# Patient Record
Sex: MALE | Race: WHITE | NOT HISPANIC OR LATINO | Employment: UNEMPLOYED | URBAN - NONMETROPOLITAN AREA
[De-identification: names, ages, dates, MRNs, and addresses within clinical notes are randomized per-mention and may not be internally consistent; named-entity substitution may affect disease eponyms.]

---

## 2017-04-21 ENCOUNTER — HISTORY (OUTPATIENT)
Dept: EMERGENCY MEDICINE | Facility: OTHER | Age: 42
End: 2017-04-21

## 2017-10-03 ENCOUNTER — OFFICE VISIT - GICH (OUTPATIENT)
Dept: FAMILY MEDICINE | Facility: OTHER | Age: 42
End: 2017-10-03

## 2017-10-03 ENCOUNTER — HISTORY (OUTPATIENT)
Dept: FAMILY MEDICINE | Facility: OTHER | Age: 42
End: 2017-10-03

## 2017-10-03 ENCOUNTER — HOSPITAL ENCOUNTER (OUTPATIENT)
Dept: RADIOLOGY | Facility: OTHER | Age: 42
End: 2017-10-03
Attending: NURSE PRACTITIONER

## 2017-10-03 DIAGNOSIS — M25.522 PAIN IN LEFT ELBOW: ICD-10-CM

## 2017-10-03 DIAGNOSIS — S50.02XA CONTUSION OF LEFT ELBOW: ICD-10-CM

## 2017-10-17 ENCOUNTER — COMMUNICATION - GICH (OUTPATIENT)
Dept: FAMILY MEDICINE | Facility: OTHER | Age: 42
End: 2017-10-17

## 2017-10-17 DIAGNOSIS — E10.3599 TYPE 1 DIABETES MELLITUS WITH PROLIFERATIVE RETINOPATHY WITHOUT MACULAR EDEMA (H): ICD-10-CM

## 2017-11-15 ENCOUNTER — APPOINTMENT (OUTPATIENT)
Dept: OCCUPATIONAL MEDICINE | Facility: OTHER | Age: 42
End: 2017-11-15

## 2017-11-15 PROCEDURE — 99000 SPECIMEN HANDLING OFFICE-LAB: CPT

## 2017-12-19 ENCOUNTER — APPOINTMENT (OUTPATIENT)
Dept: GENERAL RADIOLOGY | Facility: HOSPITAL | Age: 42
End: 2017-12-19
Attending: NURSE PRACTITIONER
Payer: MEDICAID

## 2017-12-19 ENCOUNTER — HOSPITAL ENCOUNTER (EMERGENCY)
Facility: HOSPITAL | Age: 42
Discharge: HOME | End: 2017-12-19
Attending: NURSE PRACTITIONER | Admitting: NURSE PRACTITIONER
Payer: MEDICAID

## 2017-12-19 VITALS
DIASTOLIC BLOOD PRESSURE: 80 MMHG | OXYGEN SATURATION: 97 % | HEART RATE: 91 BPM | RESPIRATION RATE: 16 BRPM | SYSTOLIC BLOOD PRESSURE: 127 MMHG | TEMPERATURE: 96.6 F

## 2017-12-19 DIAGNOSIS — S39.012A STRAIN OF LUMBAR REGION, INITIAL ENCOUNTER: ICD-10-CM

## 2017-12-19 DIAGNOSIS — M62.830 BACK MUSCLE SPASM: ICD-10-CM

## 2017-12-19 PROCEDURE — 96372 THER/PROPH/DIAG INJ SC/IM: CPT

## 2017-12-19 PROCEDURE — 99203 OFFICE O/P NEW LOW 30 MIN: CPT | Performed by: NURSE PRACTITIONER

## 2017-12-19 PROCEDURE — 99214 OFFICE O/P EST MOD 30 MIN: CPT | Mod: 25

## 2017-12-19 PROCEDURE — 72100 X-RAY EXAM L-S SPINE 2/3 VWS: CPT | Mod: TC

## 2017-12-19 PROCEDURE — 25000128 H RX IP 250 OP 636: Performed by: NURSE PRACTITIONER

## 2017-12-19 RX ORDER — IBUPROFEN 800 MG/1
800 TABLET, FILM COATED ORAL EVERY 8 HOURS PRN
Qty: 20 TABLET | Refills: 0 | Status: SHIPPED | OUTPATIENT
Start: 2017-12-19 | End: 2017-12-27

## 2017-12-19 RX ORDER — CYCLOBENZAPRINE HCL 10 MG
10 TABLET ORAL 3 TIMES DAILY PRN
Qty: 20 TABLET | Refills: 0 | Status: SHIPPED | OUTPATIENT
Start: 2017-12-19 | End: 2018-09-13

## 2017-12-19 RX ORDER — KETOROLAC TROMETHAMINE 30 MG/ML
60 INJECTION, SOLUTION INTRAMUSCULAR; INTRAVENOUS ONCE
Status: COMPLETED | OUTPATIENT
Start: 2017-12-19 | End: 2017-12-19

## 2017-12-19 RX ADMIN — KETOROLAC TROMETHAMINE 60 MG: 30 INJECTION, SOLUTION INTRAMUSCULAR at 16:54

## 2017-12-19 NOTE — ED AVS SNAPSHOT
[facility]    [address]    [place] [address]    Phone:  [phone]                                       Malik Melendez   MRN: [id number]    Department:  [facility]   Date of Visit:  12/19/2017           After Visit Summary Signature Page     I have received my discharge instructions, and my questions have been answered. I have discussed any challenges I see with this plan with the nurse or doctor.    ..........................................................................................................................................  Patient/Patient Representative Signature      ..........................................................................................................................................  Patient Representative Print Name and Relationship to Patient    ..................................................               ................................................  Date                                            Time    ..........................................................................................................................................  Reviewed by Signature/Title    ...................................................              ..............................................  Date                                                            Time

## 2017-12-19 NOTE — ED AVS SNAPSHOT
[facility]    [address]    [facility] [address]    Phone:  [phone]                                       Malik Melendez   MRN: [id number]    Department:  [facility]   Date of Visit:  12/19/2017           Patient Information     Date Of Birth          1975        Your diagnoses for this visit were:     Strain of lumbar region, initial encounter     Back muscle spasm        You were seen by Jessica Moss NP.      Follow-up Information     Follow up with Josué Puentes MD.    Specialty:  Speciality Unknown    Why:  As needed, If symptoms worsen    Contact information:    [facility]  [address]  [facility] [address] [phone]          Follow up with [facility].    Specialty:  EMERGENCY MEDICINE    Why:  As needed, If symptoms worsen    Contact information:    [address]  [facility] [address] [phone]    Additional information:    From [facility]: Take [address]. Turn left at [address]. Turn left at the first stoplight on [address]. At the first stop sign, take a right onto [address]. Take a left into the parking lot and continue through until you reach the North enterance of the building.       From [place]: Take [address]. Take the [address] ramp towards [place]. Turn left onto [address]. Take a slight right onto [address]. Turn left at the first stoplight on [address]. At the first stop sign, take a right onto [address]. Take a left into the parking lot and continue through until you reach the North enterance of the building.       From [place]: Take [address]. Take a right at [address]. At the first stop sign, take a right onto [address]. Take a left into the parking lot and continue through until you reach the North enterance of the building.         Discharge Instructions       Take tylenol and/or ibuprofen for  pain. Take ibuprofen with food. Do not take other NSAIDS (aspirin, Advil, etc..) when taking.   Take Flexeril as needed as directed for muscle spasms. Do not drive or participate in activities that require alertness when taking.   Apply ice and/or heat to back for 20 minutes every 1-2 hours.   Work note.   Follow up with PCP with any increase in symptoms or concerns.   Return to urgent care or emergency department with any increase in symptoms or concerns.     Discharge References/Attachments     MUSCLE SPASM (ENGLISH)    BACK SPRAIN/STRAIN (ENGLISH)         Review of your medicines      START taking        Dose / Directions Last dose taken    cyclobenzaprine 10 MG tablet   Commonly known as:  FLEXERIL   Dose:  10 mg   Quantity:  20 tablet        Take 1 tablet (10 mg) by mouth 3 times daily as needed for muscle spasms   Refills:  0        ibuprofen 800 MG tablet   Commonly known as:  ADVIL/MOTRIN   Dose:  800 mg   Quantity:  20 tablet        Take 1 tablet (800 mg) by mouth every 8 hours as needed for moderate pain   Refills:  0          Our records show that you are taking the medicines listed below. If these are incorrect, please call your family doctor or clinic.        Dose / Directions Last dose taken    CIALIS 10 MG tablet   Quantity:  one month supply   Generic drug:  tadalafil        ONE TABLET, TAKEN AT LEAST 30 MINUTES BEFORE INTERCOURSE   Refills:  3        citalopram 40 MG tablet   Commonly known as:  celeXA        1 TABLET DAILY   Refills:  0        HUMALOG SC        None Entered   Refills:  0        LANTUS SC        None Entered   Refills:  0        LIPITOR 10 MG tablet   Generic drug:  atorvastatin        1 TABLET DAILY   Refills:  0        lisinopril 10 MG tablet   Commonly known as:  PRINIVIL/ZESTRIL        1 TABLET DAILY   Refills:  0        LOTRISONE cream   Quantity:  60g   Generic drug:  clotrimazole-betamethasone        APPLY TWICE DAILY   Refills:  0        MEDROL (JANETH) 4 MG tablet   Quantity:   "21   Generic drug:  methylPREDNISolone        AS DIRECTED   Refills:  0        NIZORAL 200 MG tablet   Quantity:  28   Generic drug:  ketoconazole        1 TABLET PO BID x 2 weeks   Refills:  0        NYQUIL PO        1 TABLESPOONFUL EVERY 4 TO 6 HOURS AS NEEDED   Refills:  0        SEROQUEL 25 MG tablet   Generic drug:  QUEtiapine        1 TABLET TWICE DAILY   Refills:  0        traZODone 100 MG tablet   Commonly known as:  DESYREL        1 TABLET 3 TIMES DAILY   Refills:  0        triamcinolone 0.1 % cream   Commonly known as:  KENALOG   Quantity:  60 grams        apply to skin rash BID   Refills:  0                Prescriptions were sent or printed at these locations (2 Prescriptions)                   [facility] - [place], [place] - [address] [address]   [address] [address], [place] [address]    Telephone:  [phone]   Fax:  [phone]   Hours:                  E-Prescribed (2 of 2)         cyclobenzaprine (FLEXERIL) 10 MG tablet               ibuprofen (ADVIL/MOTRIN) 800 MG tablet                Procedures and tests performed during your visit     Lumbar spine XR, 2-3 views      Orders Needing Specimen Collection     None      Pending Results     No orders found from 12/17/2017 to 12/20/2017.            Pending Culture Results     No orders found from 12/17/2017 to 12/20/2017.            Thank you for choosing [facility]       Thank you for choosing [facility] for your care. Our goal is always to provide you with excellent care. Hearing back from our patients is one way we can continue to improve our services. Please take a few minutes to complete the written survey that you may receive in the mail after you visit with us. Thank you!        [facility] Information     [facility] lets you send messages to your doctor, view your test results, renew your prescriptions, schedule appointments and more. To sign up, go to [email or web address] . Click on \"Log in\" on the left side of the screen, which will take you to the " "Welcome page. Then click on \"Sign up Now\" on the right side of the page.     You will be asked to enter the access code listed below, as well as some personal information. Please follow the directions to create your username and password.     Your access code is: [email or web address]  Expires: 3/19/2018  5:04 PM     Your access code will  in 90 days. If you need help or a new code, please call your [place] clinic or [phone].        Care EveryWhere ID     This is your Care EveryWhere ID. This could be used by other organizations to access your [place] medical records  [id number]        Equal Access to Services     MELIZA HUMPHREY : Ismael Bal, brady valdez, tip alvarado, emily herrera. So [facility] [phone].    ATENCIÓN: Si habla español, tiene a gallardo disposición servicios gratuitos de asistencia lingüística. Llame al [phone].    We comply with applicable federal civil rights laws and [place] laws. We do not discriminate on the basis of race, color, national origin, age, disability, sex, sexual orientation, or gender identity.            After Visit Summary       This is your record. Keep this with you and show to your community pharmacist(s) and doctor(s) at your next visit.                  "

## 2017-12-19 NOTE — ED NOTES
"C/o lower back and left leg pain resulting from \"squatting\" wrong when lifting a fuel oil tank at home. Took tramadol for pain  "

## 2017-12-19 NOTE — LETTER
[facility]  [address]  [place] [address]  Phone: [phone]    December 19, 2017        Malik Ledbetterch  [address] [place] [address]          To whom it may concern:    RE: Malik Melendez    Patient was seen and treated today at our clinic.    Please excuse from work on 12-19-17.       Sincerely,        JACKSON Queen  12/19/2017  5:05 PM  UR[facility]ARE CL[facility]IC

## 2017-12-19 NOTE — DISCHARGE INSTRUCTIONS
Take tylenol and/or ibuprofen for pain. Take ibuprofen with food. Do not take other NSAIDS (aspirin, Advil, etc..) when taking.   Take Flexeril as needed as directed for muscle spasms. Do not drive or participate in activities that require alertness when taking.   Apply ice and/or heat to back for 20 minutes every 1-2 hours.   Work note.   Follow up with PCP with any increase in symptoms or concerns.   Return to urgent care or emergency department with any increase in symptoms or concerns.

## 2017-12-19 NOTE — ED PROVIDER NOTES
History     Chief Complaint   Patient presents with     Back Pain     low back and left leg, injured today while lifting fuel oil tank     The history is provided by the patient. No  was used.     Malik Melendez is a 42 year old male who presents with low back pain with radiation down left hip. He was lifting a fuel oil tank out of his basement today with a friend. He felt low back pain after lifting tank. No interventions for symptoms. Denies fever, chills, or night sweats. Eating and drinking well. Bowel and bladder are working well. Denies incontinence of bowel or bladder. Denies saddle parathesis. He is a type 1 diabetic, but doesn't check blood glucose. Denies IV drug use.       Problem List:    There are no active problems to display for this patient.       Past Medical History:    History reviewed. No pertinent past medical history.    Past Surgical History:    History reviewed. No pertinent surgical history.    Family History:    No family history on file.    Social History:  Marital Status:  Single [1]  Social History   Substance Use Topics     Smoking status: Not on file     Smokeless tobacco: Not on file     Alcohol use Not on file        Medications:      cyclobenzaprine (FLEXERIL) 10 MG tablet   ibuprofen (ADVIL/MOTRIN) 800 MG tablet   LOTRISONE 1-0.05 % EX CREA   MEDROL (JANETH) 4 MG OR TABS   NIZORAL 200 MG OR TABS   TRIAMCINOLONE ACETONIDE 0.1 % EX CREA   NYQUIL OR   HUMALOG SC   LANTUS SC   TRAZODONE  MG OR TABS   CITALOPRAM HYDROBROMIDE 40 MG OR TABS   LIPITOR 10 MG OR TABS   LISINOPRIL 10 MG OR TABS   SEROQUEL 25 MG OR TABS   CIALIS 10 MG OR TABS         Review of Systems   Constitutional: Positive for activity change. Negative for appetite change, chills and fever.   HENT: Negative for trouble swallowing.    Respiratory: Negative for cough.    Gastrointestinal: Negative for abdominal pain, diarrhea, nausea and vomiting.   Genitourinary: Negative for dysuria.    Musculoskeletal: Positive for back pain.        Lumbar back pain.    Skin: Negative for rash and wound.   Neurological: Negative for weakness and numbness.        Tingling into left hip.    Psychiatric/Behavioral: Negative.        Physical Exam   BP: 127/80  Pulse: 91  Temp: 96.6  F (35.9  C)  Resp: 16  SpO2: 97 %      Physical Exam   Constitutional: He is oriented to person, place, and time. He appears well-developed and well-nourished. No distress.   HENT:   Head: Normocephalic.   Mouth/Throat: Oropharynx is clear and moist.   Neck: Normal range of motion. Neck supple.   Cardiovascular: Normal rate, regular rhythm, normal heart sounds and intact distal pulses.    No murmur heard.  Pulmonary/Chest: Effort normal. No respiratory distress. He has no wheezes. He has no rales.   Abdominal: Soft. He exhibits no distension.   Musculoskeletal: Normal range of motion. He exhibits tenderness. He exhibits no edema or deformity.   CMS and ROM intact to all extremities. Distal pulses intact. Flexion and extension intact to upper extremity. Able to perform a straight leg raise. NO TTP to spinal column or step offs. Pain is reproducible to L4-S1 bilateral lateral region.    Lymphadenopathy:     He has no cervical adenopathy.   Neurological: He is alert and oriented to person, place, and time. He displays normal reflexes. He exhibits normal muscle tone.   Skin: Skin is warm and dry. No rash noted. He is not diaphoretic. No erythema.   No erythema, rash, bruising, or warmth to touch to posterior back.    Psychiatric: He has a normal mood and affect. His behavior is normal.   Nursing note and vitals reviewed.      ED Course     ED Course     Procedures    I personally reviewed the x-rays and there is NO fracture or dislocation. Radiology review pending and nurse will notify patient if there is any change in the treatment plan.    Results for orders placed or performed during the hospital encounter of 12/19/17   Lumbar spine XR,  2-3 views    Narrative    Procedure: XR LUMBAR SPINE 2-3 VIEWS    HISTORY:  Low back pain after a lifting injury.    TECHNIQUE: Lumbar spine 3 views.    COMPARISON: None.    FINDINGS:    There is normal alignment.  Vertebral body heights are maintained.   Disc spaces are maintained. There are mild degenerative endplate  changes at several levels.      Impression    IMPRESSION: Mild degenerative disease.      SKYLA HALL MD     Medications   ketorolac (TORADOL) injection 60 mg (60 mg Intramuscular Given 12/19/17 5347)     Monitored for 20 minutes and tolerated well. He is driving so unable to give muscle relaxers here.     Assessments & Plan (with Medical Decision Making)     Discussed plan of care. He verbalized understanding. All questions answered.     I have reviewed the nursing notes.    I have reviewed the findings, diagnosis, plan and need for follow up with the patient.  Discharged in stable condition.     New Prescriptions    CYCLOBENZAPRINE (FLEXERIL) 10 MG TABLET    Take 1 tablet (10 mg) by mouth 3 times daily as needed for muscle spasms    IBUPROFEN (ADVIL/MOTRIN) 800 MG TABLET    Take 1 tablet (800 mg) by mouth every 8 hours as needed for moderate pain       Final diagnoses:   Strain of lumbar region, initial encounter   Back muscle spasm     Take tylenol and/or ibuprofen for pain. Take ibuprofen with food. Do not take other NSAIDS (aspirin, Advil, etc..) when taking.   Take Flexeril as needed as directed for muscle spasms. Do not drive or participate in activities that require alertness when taking.   Apply ice and/or heat to back for 20 minutes every 1-2 hours.   Work note.   Follow up with PCP with any increase in symptoms or concerns.   Return to urgent care or emergency department with any increase in symptoms or concerns.     JACKSON Queen  12/19/2017  3:54 PM  [facility]       Jessica Moss NP  12/22/17 0899

## 2017-12-20 ENCOUNTER — HOSPITAL ENCOUNTER (EMERGENCY)
Facility: HOSPITAL | Age: 42
Discharge: HOME | End: 2017-12-20
Attending: NURSE PRACTITIONER | Admitting: NURSE PRACTITIONER
Payer: MEDICAID

## 2017-12-20 VITALS
TEMPERATURE: 96.8 F | HEIGHT: 69 IN | RESPIRATION RATE: 20 BRPM | OXYGEN SATURATION: 100 % | SYSTOLIC BLOOD PRESSURE: 118 MMHG | BODY MASS INDEX: 28.44 KG/M2 | DIASTOLIC BLOOD PRESSURE: 71 MMHG | WEIGHT: 192 LBS

## 2017-12-20 DIAGNOSIS — M62.830 BACK MUSCLE SPASM: ICD-10-CM

## 2017-12-20 DIAGNOSIS — S33.5XXD LUMBAR SPRAIN, SUBSEQUENT ENCOUNTER: ICD-10-CM

## 2017-12-20 LAB
ANION GAP SERPL CALCULATED.3IONS-SCNC: 5 MMOL/L (ref 3–14)
BUN SERPL-MCNC: 19 MG/DL (ref 7–30)
CALCIUM SERPL-MCNC: 8.3 MG/DL (ref 8.5–10.1)
CHLORIDE SERPL-SCNC: 101 MMOL/L (ref 94–109)
CO2 SERPL-SCNC: 31 MMOL/L (ref 20–32)
CREAT SERPL-MCNC: 1 MG/DL (ref 0.66–1.25)
GFR SERPL CREATININE-BSD FRML MDRD: 82 ML/MIN/1.7M2
GLUCOSE SERPL-MCNC: 213 MG/DL (ref 70–99)
POTASSIUM SERPL-SCNC: 4.3 MMOL/L (ref 3.4–5.3)
SODIUM SERPL-SCNC: 137 MMOL/L (ref 133–144)

## 2017-12-20 PROCEDURE — 25000132 ZZH RX MED GY IP 250 OP 250 PS 637: Performed by: NURSE PRACTITIONER

## 2017-12-20 PROCEDURE — 96372 THER/PROPH/DIAG INJ SC/IM: CPT

## 2017-12-20 PROCEDURE — 80048 BASIC METABOLIC PNL TOTAL CA: CPT | Performed by: NURSE PRACTITIONER

## 2017-12-20 PROCEDURE — 25000128 H RX IP 250 OP 636: Performed by: NURSE PRACTITIONER

## 2017-12-20 PROCEDURE — 99213 OFFICE O/P EST LOW 20 MIN: CPT | Performed by: NURSE PRACTITIONER

## 2017-12-20 PROCEDURE — 36415 COLL VENOUS BLD VENIPUNCTURE: CPT | Performed by: NURSE PRACTITIONER

## 2017-12-20 PROCEDURE — 99214 OFFICE O/P EST MOD 30 MIN: CPT | Mod: 25

## 2017-12-20 RX ORDER — KETOROLAC TROMETHAMINE 30 MG/ML
60 INJECTION, SOLUTION INTRAMUSCULAR; INTRAVENOUS ONCE
Status: COMPLETED | OUTPATIENT
Start: 2017-12-20 | End: 2017-12-20

## 2017-12-20 RX ORDER — KETOROLAC TROMETHAMINE 10 MG/1
10 TABLET, FILM COATED ORAL EVERY 6 HOURS PRN
Qty: 20 TABLET | Refills: 0 | Status: SHIPPED | OUTPATIENT
Start: 2017-12-20 | End: 2018-09-13

## 2017-12-20 RX ORDER — DIAZEPAM 5 MG
5-10 TABLET ORAL EVERY 8 HOURS PRN
Qty: 15 TABLET | Refills: 0 | Status: SHIPPED | OUTPATIENT
Start: 2017-12-20 | End: 2018-09-13

## 2017-12-20 RX ORDER — DIAZEPAM 5 MG
10 TABLET ORAL ONCE
Status: COMPLETED | OUTPATIENT
Start: 2017-12-20 | End: 2017-12-20

## 2017-12-20 RX ADMIN — DIAZEPAM 10 MG: 5 TABLET ORAL at 17:27

## 2017-12-20 RX ADMIN — KETOROLAC TROMETHAMINE 60 MG: 30 INJECTION, SOLUTION INTRAMUSCULAR at 17:39

## 2017-12-20 NOTE — ED AVS SNAPSHOT
[facility]    [address]    [place] [address]    Phone:  [phone]                                       Malik Melendez   MRN: [id number]    Department:  [facility]   Date of Visit:  12/20/2017           After Visit Summary Signature Page     I have received my discharge instructions, and my questions have been answered. I have discussed any challenges I see with this plan with the nurse or doctor.    ..........................................................................................................................................  Patient/Patient Representative Signature      ..........................................................................................................................................  Patient Representative Print Name and Relationship to Patient    ..................................................               ................................................  Date                                            Time    ..........................................................................................................................................  Reviewed by Signature/Title    ...................................................              ..............................................  Date                                                            Time

## 2017-12-20 NOTE — ED PROVIDER NOTES
History     Chief Complaint   Patient presents with     Back Pain     The history is provided by the patient. No  was used.     Malik Melendez is a 42 year old male who presents today with a CC of continued low back pain.  He was seen yesterday for a low back pain.  He had an x-ray yesterday, no acute fractures, mild degenerative changes, see below.  He has been taking ibuprofen 800 mg every 8-12 hours and Flexeril 10 mg every 6-8 hours.  He states he is not getting any relief from this regimen.  He has also been using heat.  He has a history of low back pain with occasional flair.  No fevers.  No change in bowel or bladder.  He denies saddle paresthesia. No history of IVDU, no trauma.   He does have some intermittent tingling in the left leg.  He is a type 1 diabetic, he does not check blood sugars frequently.  Last BS check 1-2 weeks ago.  He uses lantus and humalog for insulin.  He uses a sliding scale for meals.  He has the equipment to check his blood sugars at home.  He states he was walking to his car today and slipped, he did not fall but jerked his body to stay standing.  He david his back doing this.     Problem List:    There are no active problems to display for this patient.       Past Medical History:    History reviewed. No pertinent past medical history.    Past Surgical History:    History reviewed. No pertinent surgical history.    Family History:    No family history on file.    Social History:  Marital Status:  Single [1]  Social History   Substance Use Topics     Smoking status: Never Smoker     Smokeless tobacco: Current User     Alcohol use Not on file        Medications:      ketorolac (TORADOL) 10 MG tablet   diazepam (VALIUM) 5 MG tablet   cyclobenzaprine (FLEXERIL) 10 MG tablet   ibuprofen (ADVIL/MOTRIN) 800 MG tablet   HUMALOG SC   LANTUS SC   CITALOPRAM HYDROBROMIDE 40 MG OR TABS   LIPITOR 10 MG OR TABS   LISINOPRIL 10 MG OR TABS   CIALIS 10 MG OR TABS  "        Review of Systems   Constitutional: Negative for appetite change, chills, fatigue and fever.   Respiratory: Negative for cough and shortness of breath.    Gastrointestinal: Negative for abdominal pain, diarrhea, nausea and vomiting.   Genitourinary: Negative for dysuria.   Musculoskeletal: Positive for back pain.   Skin: Negative for rash and wound.   Neurological: Negative for dizziness, weakness, numbness and headaches.       Physical Exam   BP: 118/71  Heart Rate: 99  Temp: 96.8  F (36  C)  Resp: 20  Height: 175.3 cm (5' 9\")  Weight: 87.1 kg (192 lb)  SpO2: 100 %      Physical Exam   Constitutional: He is oriented to person, place, and time. He appears well-developed. He is cooperative.  Non-toxic appearance. He does not appear ill.   HENT:   Head: Normocephalic and atraumatic.   Neck: Normal range of motion. Neck supple. No spinous process tenderness present.   Cardiovascular: Normal rate and regular rhythm.    Pulmonary/Chest: Effort normal and breath sounds normal.   Musculoskeletal:   Back: No obvious ecchymosis, edema or erythema.  Bony tenderness to: lumbar spine  Paraspinal muscle tenderness to: lumbar and thoracic paraspinal muscles  Active range of motion at waist  Forward flexion: 15/90 degrees with pain  Right Rotation: 15/30 degrees with pain  Left Rotation: 15/30 degrees with pain  Straight leg raise negative  BLE strength: WNL  Able to stand on heels and toes  Patellar reflexes intact  Bilateral pedal pulses intact  Sensation intact, capillary refill < 3 seconds bilateral LE's   Neurological: He is alert and oriented to person, place, and time.   Skin: Skin is warm and dry.   Nursing note and vitals reviewed.      ED Course     ED Course     Procedures    Medications   ketorolac (TORADOL) injection 60 mg (60 mg Intramuscular Given 12/20/17 1739)   diazepam (VALIUM) tablet 10 mg (10 mg Oral Given 12/20/17 1727)     Observed for a minimum of 20 minutes, tolerated medications well, no adverse " "efects noted, reports pain is mildly improved on discharge.    Results for orders placed or performed during the hospital encounter of 12/20/17   Basic metabolic panel   Result Value Ref Range    Sodium 137 133 - 144 mmol/L    Potassium 4.3 3.4 - 5.3 mmol/L    Chloride 101 94 - 109 mmol/L    Carbon Dioxide 31 20 - 32 mmol/L    Anion Gap 5 3 - 14 mmol/L    Glucose 213 (H) 70 - 99 mg/dL    Urea Nitrogen 19 7 - 30 mg/dL    Creatinine 1.00 0.66 - 1.25 mg/dL    GFR Estimate 82 >60 mL/min/1.7m2    GFR Estimate If Black >90 >60 mL/min/1.7m2    Calcium 8.3 (L) 8.5 - 10.1 mg/dL     Study Result 12-19-17  Procedure: XR LUMBAR SPINE 2-3 VIEWS     HISTORY:  Low back pain after a lifting injury.     TECHNIQUE: Lumbar spine 3 views.     COMPARISON: None.     FINDINGS:     There is normal alignment.  Vertebral body heights are maintained.   Disc spaces are maintained. There are mild degenerative endplate  changes at several levels.         IMPRESSION: Mild degenerative disease.       SKYLA HALL MD       Assessments & Plan (with Medical Decision Making)     I have reviewed the nursing notes.    I have reviewed the findings, diagnosis, plan and need for follow up with the patient.  ASSESSMENT / PLAN:  (S33.5XXD) Lumbar sprain, subsequent encounter  Comment: slipped and re-strained muscles today  Plan:  Rest, ice/heat for comfort   Toradol for pain/inflammation   Valium for muscle relaxer do not drive or participate in activities that require mental alertness while taking   Back stretches and exercises as discussed and per discharge instructions   Chiropractics or PT as warranted   Patient verbally educated and given appropriate education sheets for their diagnoses and has no questions.   Take medications as directed.    Return to ED/UC if symptoms increase or concerns develop such as those discussed and listed on the \"When to go the Emergency Room\" portion of your discharge instructions.     Follow up with your Primary Care " provider if symptoms do not improve in 3-5 days    (M62.830) Back muscle spasm  Comment: see above      Discharge Medication List as of 12/20/2017  6:26 PM      START taking these medications    Details   ketorolac (TORADOL) 10 MG tablet Take 1 tablet (10 mg) by mouth every 6 hours as needed, Disp-20 tablet, R-0, E-Prescribe      diazepam (VALIUM) 5 MG tablet Take 1-2 tablets (5-10 mg) by mouth every 8 hours as needed (MUSCLE SPASM), Disp-15 tablet, R-0, Local Print             Final diagnoses:   Lumbar sprain, subsequent encounter   Back muscle spasm       12/20/2017   [facility]     Nelida Hines NP  12/21/17 9980       Nelida Hines NP  12/21/17 8958

## 2017-12-20 NOTE — ED NOTES
Ambulated independently to UC 4. Was in UC yesterday with lower back pain and was prescribed Flexeril and Advil. Took advil this AM and flexeril at work today and pain got worse during work. Rating pain 9.5/10. Stated slipped on ice getting into car around 2pm and pain worsened.

## 2017-12-20 NOTE — ED AVS SNAPSHOT
[facility]    [address] [address]    Phone:  [phone]                                       Malik Melendez   MRN: [id number]    Department:  [facility]   Date of Visit:  12/20/2017           Patient Information     Date Of Birth          1975        Your diagnoses for this visit were:     Lumbar sprain, subsequent encounter     Back muscle spasm        You were seen by Nelida Hines NP.      Follow-up Information     Follow up with [facility].    Specialty:  EMERGENCY MEDICINE    Why:  As needed, If symptoms worsen, or concerns develop    Contact information:    [address] [address] [phone]    Additional information:    From [facility]: Take [address]. Turn left at [address]. Turn left at the first stoplight on [address]. At the first stop sign, take a right onto [address]. Take a left into the parking lot and continue through until you reach the North enterance of the building.       From [place]: Take [address]. Take the [address] ramp towards [place]. Turn left onto [address]. Take a slight right onto [address]. Turn left at the first stoplight on [address]. At the first stop sign, take a right onto [address]. Take a left into the parking lot and continue through until you reach the North enterance of the building.       From [place]: Take [address]. Take a right at [address]. At the first stop sign, take a right onto [address]. Take a left into the parking lot and continue through until you reach the North enterance of the building.         Follow up with Josué Puentes MD.    Specialty:  Speciality Unknown    Why:  As needed, if symptoms do not improve    Contact information:    [place] [id number]  [facility] [address]  [phone]          Discharge Instructions         Causes of  Lumbar (Low Back) Pain  Low back pain can be caused by problems with any part of the lumbar spine. A disk can herniate (push out) and press on a nerve. Vertebrae can rub against each other or slip out of place. This can irritate facet joints and nerves. It can also lead to stenosis, a narrowing of the spinal canal or foramen.  Pressure from a disk  Constant wear and tear on a disk can cause it to weaken and push outward. Part of the disk may then press on nearby nerves. There are two common types of herniated disks:  Contained means the soft nucleus is protruding outward.   Extruded means the firm annulus has torn, letting the soft center squeeze through.     Pressure from bone  An unstable spine   With age, a disk may thin and wear out. Vertebrae above and below the disk may begin to touch. This can put pressure on nerves. It can also cause bone spurs (growths) to form where the bones rub together.    Stenosis results when bone spurs narrow the foramen or spinal canal. This also puts pressure on nerves. Slipping vertebrae can irritate nerves and joints. They can also worsen stenosis.    In some cases, vertebrae become unstable and slip forward. This is called spondylolisthesis.     Date Last Reviewed: 10/12/2015    3242-6286 [employer]. [address] [place] [address], [place] [id number]. All rights reserved. This information is not intended as a substitute for professional medical care. Always follow your healthcare professional's instructions.             Review of your medicines      START taking        Dose / Directions Last dose taken    diazepam 5 MG tablet   Commonly known as:  VALIUM   Dose:  5-10 mg   Quantity:  15 tablet        Take 1-2 tablets (5-10 mg) by mouth every 8 hours as needed (MUSCLE SPASM)   Refills:  0        ketorolac 10 MG tablet   Commonly known as:  TORADOL   Dose:  10 mg   Quantity:  20 tablet        Take 1 tablet (10 mg) by mouth every 6 hours as needed   Refills:  0          Our  records show that you are taking the medicines listed below. If these are incorrect, please call your family doctor or clinic.        Dose / Directions Last dose taken    CIALIS 10 MG tablet   Quantity:  one month supply   Generic drug:  tadalafil        ONE TABLET, TAKEN AT LEAST 30 MINUTES BEFORE INTERCOURSE   Refills:  3        citalopram 40 MG tablet   Commonly known as:  celeXA        1 TABLET DAILY   Refills:  0        cyclobenzaprine 10 MG tablet   Commonly known as:  FLEXERIL   Dose:  10 mg   Quantity:  20 tablet        Take 1 tablet (10 mg) by mouth 3 times daily as needed for muscle spasms   Refills:  0        HUMALOG SC        None Entered   Refills:  0        ibuprofen 800 MG tablet   Commonly known as:  ADVIL/MOTRIN   Dose:  800 mg   Quantity:  20 tablet        Take 1 tablet (800 mg) by mouth every 8 hours as needed for moderate pain   Refills:  0        LANTUS SC        50 units daily   Refills:  0        LIPITOR 10 MG tablet   Generic drug:  atorvastatin        1 TABLET DAILY   Refills:  0        lisinopril 10 MG tablet   Commonly known as:  PRINIVIL/ZESTRIL        1 TABLET DAILY   Refills:  0                Prescriptions were sent or printed at these locations (2 Prescriptions)                   [facility] [address] [place] [address], [place] - [address] [facility] [address]   [address] [facility] [address], [facility] [address]    Telephone:  [phone]   Fax:  [phone]   Hours:                  E-Prescribed (1 of 2)         ketorolac (TORADOL) 10 MG tablet                 Printed at Department/Unit printer (1 of 2)         diazepam (VALIUM) 5 MG tablet                Procedures and tests performed during your visit     Basic metabolic panel      Orders Needing Specimen Collection     None      Pending Results     No orders found from 12/18/2017 to 12/21/2017.            Pending Culture Results     No orders found from 12/18/2017 to 12/21/2017.            Thank you for choosing [facility]       Thank you for choosing [facility] for your care.  "Our goal is always to provide you with excellent care. Hearing back from our patients is one way we can continue to improve our services. Please take a few minutes to complete the written survey that you may receive in the mail after you visit with us. Thank you!        [employer] Information     [employer] lets you send messages to your doctor, view your test results, renew your prescriptions, schedule appointments and more. To sign up, go to [email or web address] . Click on \"Log in\" on the left side of the screen, which will take you to the Welcome page. Then click on \"Sign up Now\" on the right side of the page.     You will be asked to enter the access code listed below, as well as some personal information. Please follow the directions to create your username and password.     Your access code is: [email or web address]  Expires: 3/19/2018  5:04 PM     Your access code will  in 90 days. If you need help or a new code, please call your [place] clinic or [phone].        Care EveryWhere ID     This is your Care EveryWhere ID. This could be used by other organizations to access your [place] medical records  [id number]        Equal Access to Services     MELIZA HUMPHREY : Ismael Bal, wage valdez, qabeverly jorgemavandana alvarado, emily herrera. So [facility] [phone].    ATENCIÓN: Si habla español, tiene a gallardo disposición servicios gratuitos de asistencia lingüística. Llamita al [phone].    We comply with applicable federal civil rights laws and [place] laws. We do not discriminate on the basis of race, color, national origin, age, disability, sex, sexual orientation, or gender identity.            After Visit Summary       This is your record. Keep this with you and show to your community pharmacist(s) and doctor(s) at your next visit.                  "

## 2017-12-20 NOTE — LETTER
December 20, 2017      To Whom It May Concern:      Malik Melendez was seen in our Urgent Care Department today, 12/20/17.  I expect his condition to improve over the next 1-3 days.  He may return to work/school when improved.    Sincerely,        Nelida Hines, NP

## 2017-12-21 ASSESSMENT — ENCOUNTER SYMPTOMS
VOMITING: 0
NUMBNESS: 0
HEADACHES: 0
CHILLS: 0
SHORTNESS OF BREATH: 0
WOUND: 0
ABDOMINAL PAIN: 0
FATIGUE: 0
NAUSEA: 0
WEAKNESS: 0
COUGH: 0
DIARRHEA: 0
DYSURIA: 0
BACK PAIN: 1
APPETITE CHANGE: 0
DIZZINESS: 0
FEVER: 0

## 2017-12-21 NOTE — DISCHARGE INSTRUCTIONS
Causes of Lumbar (Low Back) Pain  Low back pain can be caused by problems with any part of the lumbar spine. A disk can herniate (push out) and press on a nerve. Vertebrae can rub against each other or slip out of place. This can irritate facet joints and nerves. It can also lead to stenosis, a narrowing of the spinal canal or foramen.  Pressure from a disk  Constant wear and tear on a disk can cause it to weaken and push outward. Part of the disk may then press on nearby nerves. There are two common types of herniated disks:  Contained means the soft nucleus is protruding outward.   Extruded means the firm annulus has torn, letting the soft center squeeze through.     Pressure from bone  An unstable spine   With age, a disk may thin and wear out. Vertebrae above and below the disk may begin to touch. This can put pressure on nerves. It can also cause bone spurs (growths) to form where the bones rub together.    Stenosis results when bone spurs narrow the foramen or spinal canal. This also puts pressure on nerves. Slipping vertebrae can irritate nerves and joints. They can also worsen stenosis.    In some cases, vertebrae become unstable and slip forward. This is called spondylolisthesis.     Date Last Reviewed: 10/12/2015    2144-4024 [employer]. [address] [place] [address], [place] [address]. All rights reserved. This information is not intended as a substitute for professional medical care. Always follow your healthcare professional's instructions.

## 2017-12-22 ASSESSMENT — ENCOUNTER SYMPTOMS
PSYCHIATRIC NEGATIVE: 1
APPETITE CHANGE: 0
NAUSEA: 0
ACTIVITY CHANGE: 1
NUMBNESS: 0
FEVER: 0
ABDOMINAL PAIN: 0
COUGH: 0
WEAKNESS: 0
BACK PAIN: 1
WOUND: 0
CHILLS: 0
VOMITING: 0
DIARRHEA: 0
TROUBLE SWALLOWING: 0
DYSURIA: 0

## 2017-12-27 NOTE — PROGRESS NOTES
"Patient Information     Patient Name MRN Malik Ramos [id number] Male 1975      Progress Notes by Verónica Monge NP at 10/3/2017 12:00 PM     Author:  Verónica Monge NP Service:  (none) Author Type:  PHYS- Nurse Practitioner     Filed:  10/3/2017  1:38 PM Encounter Date:  10/3/2017 Status:  Signed     :  Verónica Monge NP (PHYS- Nurse Practitioner)            Nursing Notes:   Felisa Tatum  10/3/2017 12:25 PM  Signed  Patient presents with left elbow pain. Patient fell onto elbow 2 days ago. Patient states before fall his elbow would like up. Felisa Tatum LPN .............10/3/2017  12:03 PM    SUBJECTIVE:    Malik Melendez is a 42 y.o. male who presents for elbow pain    Elbow Injury    The incident occurred 2 days ago. The incident occurred at home. The injury mechanism was a fall and a direct blow (HE fell onto his LT elbow. ). The pain is present in the left elbow. The quality of the pain is described as aching. The pain does not radiate. The pain is at a severity of 8/10. The pain is severe. The pain has been constant since the incident. Pertinent negatives include no chest pain, muscle weakness, numbness or tingling. The symptoms are aggravated by palpation, lifting and movement. He has tried ice and NSAIDs for the symptoms. The treatment provided mild relief.       Current Outpatient Prescriptions on File Prior to Visit       Medication  Sig Dispense Refill     ASPIRIN/ACETAMINOPHEN/CAFFEINE (PAMPRIN MAX ORAL) Take  by mouth.       atorvastatin (LIPITOR) 10 mg tablet Take 1 tablet by mouth once daily. 90 tablet 2     BD INSULIN PEN NEEDLE UF 31 X 5/16 \" USE FOUR TIMES DAILY OR AS NEEDED 400 Each 2     blood sugar diagnostic (ASCENSIA CONTOUR) strip As directed. Dispense test strips covered by the patient insurance. Test 4 times per day. 1 Bottle PRN     insulin aspart (NOVOLOG FLEXPEN) 100 unit/mL solution for injection sliding scale as directed, max of 30 " "units daily 5 pen 3     insulin glargine (LANTUS) 100 unit/mL injection E10.359 Inject 54 units SQ at bedtime 20 mL 3     lancets (MICROLET LANCET) As directed. Test 4 times per day. 1 box PRN     Syringe, Disposable, 1 mL syrg As directed. may use 1 per day for diabetic meds (lantus) 100 Syringe 5     traMADol (ULTRAM) 50 mg tablet Take 1 tablet by mouth every 6 hours if needed for Pain. Hand and wrist pain 120 tablet 0     No current facility-administered medications on file prior to visit.     and   Patient Active Problem List       Diagnosis  Date Noted     Bilateral carpal tunnel syndrome  08/19/2016     S/p Surgery          Ava nodes (DJD hand)  08/19/2016     Hyperlipidemia  05/10/2010     Generalized anxiety disorder  07/01/2009     Major depressive disorder, recurrent episode, moderate (HC)  11/26/2008     Diabetes mellitus type I (HC)  09/19/2008       REVIEW OF SYSTEMS:  Review of Systems   Cardiovascular: Negative for chest pain.   Neurological: Negative for tingling and numbness.       OBJECTIVE:  Pulse 64  Temp 96.5  F (35.8  C) (Tympanic)  Resp 16  Ht 1.755 m (5' 9.09\")  Wt 89 kg (196 lb 3.2 oz)  BMI 28.89 kg/m2    EXAM:   Physical Exam   Constitutional: He is oriented to person, place, and time and well-developed, well-nourished, and in no distress.   HENT:   Head: Normocephalic and atraumatic.   Eyes: Conjunctivae are normal.   Neck: Neck supple.   Cardiovascular: Normal rate.    Pulmonary/Chest: Effort normal. No respiratory distress.   Musculoskeletal:        Left shoulder: Normal.        Left elbow: He exhibits decreased range of motion. He exhibits no swelling, no effusion, no deformity and no laceration. Tenderness found. Radial head, lateral epicondyle and olecranon process tenderness noted.        Left wrist: Normal.   C/O diffuse tenderness on the elbow. No bruising, no swelling. Mild well healing abrasion on the elbow.    Neurological: He is alert and oriented to person, place, " and time.   Skin: Skin is warm and dry. No rash noted.   Psychiatric: Mood and affect normal.   Nursing note and vitals reviewed.    Completed Elbow xray.  I personally reviewed the xray. There was no acute fractures upon initial read of xray.  Final read pending by radiology.    ASSESSMENT/PLAN:    ICD-10-CM    1. Elbow pain, left M25.522 XR ELBOW 3 VIEWS LEFT   2. Contusion of left elbow, initial encounter S50.02XA         Plan:  He often states he takes his Ultram and that does not help his elbow pain. He is told that OTC and home cares would be recommended not other narcotics. Ace bandage applied. F/u if PCP in 1 week if not improving.      [place]  is accessed and he gets Ultram from his PCP, last RX #120, with 3 refills, filled in July.     CLAUDETTE MAGANA NP ....................  10/3/2017   1:38 PM

## 2017-12-28 NOTE — TELEPHONE ENCOUNTER
Patient Information     Patient Name MRN Malik Ramos [id number] Male 1975      Telephone Encounter by Sondra Bhatt RN at 10/20/2017  7:44 AM     Author:  Sondra Bhatt RN  Service:  (none) Author Type:  NURS- Registered Nurse     Filed:  10/20/2017  9:40 AM  Encounter Date:  10/17/2017 Status:  Addendum     :  Sondra Bhatt RN (NURS- Registered Nurse)        Related Notes: Original Note by Sondra Bhatt RN (NURS- Registered Nurse) filed at 10/20/2017  9:12 AM            Unable to determine if patient is continuing to see Dao Jennings MD. Attemepted to reach patient by phone. No Answer and mailbox was full. If patient is still receiving care at [facility], he is due for follow up. Call was placed to pharmacy who states he has been receiving prescriptions from a doctor in [place], [place]. She will forward request to that provider. She was informed that if patient does need refills from [facility], to please have him call as he is overdue for an exam here and is not formally established with a provider.    Sondra Bhatt RN........10/20/2017 9:39 AM

## 2017-12-28 NOTE — PATIENT INSTRUCTIONS
Patient Information     Patient Name MRN Sex Malik Arndt [id number] Male 1975      Patient Instructions by Verónica Monge NP at 10/3/2017 12:00 PM     Author:  Verónica Monge NP Service:  (none) Author Type:  PHYS- Nurse Practitioner     Filed:  10/3/2017  1:09 PM Encounter Date:  10/3/2017 Status:  Signed     :  Verónica Monge NP (PHYS- Nurse Practitioner)            The x-ray today showed no sign of fracture. Radiologist did not find anything of significance on the x-ray.    Rest the Elbow, avoid any activity which causes pain.    Apply cold packs to the affected area for 15-20 minutes, 4 times a day. A bag of frozen corn or peas often works well as a cold pack. A cold pack is usually the best treatment for the 1st 2 days after an injury. After 48 hours, apply heat or ice, whichever gives relief.    Compress the painful area with an elastic bandage to minimize swelling. Make sure the bandage is not too tight, however. If the skin beyond the Ace wrap is swollen, cool or darker in color than the opposite side, the bandage might be too tight.    Elevate the injured area as much as you are able. If you can get this higher than the heart, this will help minimize pain and swelling.    Also, Take ibuprofen (Advil, Motrin) or naproxen (Aleve), or a similar prescription medication. Use regularly for the first 7-10 days. Later, take as needed for pain, swelling or stiffness. Take this type of medication with food to minimize any stomach irritation. Tylenol may also be taken to help ease the pain.    Call or return to clinic as needed if your pain becomes significantly worse, or fails to improve as anticipated despite following the above recommendations.

## 2017-12-30 NOTE — NURSING NOTE
Patient Information     Patient Name MRN Malik Ramos [id number] Male 1975      Nursing Note by Felisa Tatum at 10/3/2017 12:00 PM     Author:  Felisa Tatum Service:  (none) Author Type:  NURS- Student Practical Nurse     Filed:  10/3/2017 12:25 PM Encounter Date:  10/3/2017 Status:  Signed     :  Felisa Tatum (NURS- Student Practical Nurse)            Patient presents with left elbow pain. Patient fell onto elbow 2 days ago. Patient states before fall his elbow would like up. Felisa Tatum LPN .............10/3/2017  12:03 PM

## 2018-01-26 VITALS
WEIGHT: 196.2 LBS | BODY MASS INDEX: 29.06 KG/M2 | HEIGHT: 69 IN | HEART RATE: 64 BPM | RESPIRATION RATE: 16 BRPM | TEMPERATURE: 96.5 F

## 2018-01-30 ENCOUNTER — DOCUMENTATION ONLY (OUTPATIENT)
Dept: FAMILY MEDICINE | Facility: OTHER | Age: 43
End: 2018-01-30

## 2018-01-30 RX ORDER — ATORVASTATIN CALCIUM 10 MG/1
10 TABLET, FILM COATED ORAL DAILY
COMMUNITY
Start: 2016-08-19 | End: 2022-02-18

## 2018-01-30 RX ORDER — INSULIN GLARGINE 100 [IU]/ML
INJECTION, SOLUTION SUBCUTANEOUS
COMMUNITY
Start: 2016-09-26 | End: 2022-02-18

## 2018-01-30 RX ORDER — CLONIDINE HYDROCHLORIDE 0.1 MG/1
TABLET ORAL
COMMUNITY
Start: 2017-06-29 | End: 2022-03-15

## 2018-01-30 RX ORDER — DULOXETIN HYDROCHLORIDE 30 MG/1
CAPSULE, DELAYED RELEASE ORAL
COMMUNITY
Start: 2017-08-24 | End: 2022-03-15

## 2018-01-30 RX ORDER — SULINDAC 150 MG/1
TABLET ORAL
COMMUNITY
Start: 2017-08-25 | End: 2022-03-15

## 2018-01-30 RX ORDER — TRAMADOL HYDROCHLORIDE 50 MG/1
50 TABLET ORAL EVERY 6 HOURS PRN
COMMUNITY
Start: 2016-08-19 | End: 2018-11-15

## 2018-01-30 RX ORDER — LISINOPRIL 20 MG/1
TABLET ORAL
COMMUNITY
Start: 2017-08-24 | End: 2022-02-18

## 2018-01-30 RX ORDER — LANCETS
EACH MISCELLANEOUS
COMMUNITY
Start: 2010-09-14 | End: 2022-02-18

## 2018-01-30 RX ORDER — GABAPENTIN 300 MG/1
CAPSULE ORAL
COMMUNITY
Start: 2017-08-12 | End: 2018-11-15

## 2018-09-13 ENCOUNTER — HOSPITAL ENCOUNTER (EMERGENCY)
Facility: OTHER | Age: 43
Discharge: HOME | End: 2018-09-13
Attending: EMERGENCY MEDICINE | Admitting: EMERGENCY MEDICINE
Payer: MEDICAID

## 2018-09-13 ENCOUNTER — APPOINTMENT (OUTPATIENT)
Dept: GENERAL RADIOLOGY | Facility: OTHER | Age: 43
End: 2018-09-13
Attending: EMERGENCY MEDICINE
Payer: MEDICAID

## 2018-09-13 VITALS
DIASTOLIC BLOOD PRESSURE: 74 MMHG | WEIGHT: 196 LBS | OXYGEN SATURATION: 100 % | HEART RATE: 84 BPM | HEIGHT: 69 IN | SYSTOLIC BLOOD PRESSURE: 112 MMHG | BODY MASS INDEX: 29.03 KG/M2 | TEMPERATURE: 97.4 F | RESPIRATION RATE: 14 BRPM

## 2018-09-13 DIAGNOSIS — M79.89 SWELLING OF RIGHT HAND: ICD-10-CM

## 2018-09-13 LAB
BASOPHILS # BLD AUTO: 0 10E9/L (ref 0–0.2)
BASOPHILS NFR BLD AUTO: 0.3 %
CRP SERPL-MCNC: 0.1 MG/L
DIFFERENTIAL METHOD BLD: ABNORMAL
EOSINOPHIL # BLD AUTO: 0.1 10E9/L (ref 0–0.7)
EOSINOPHIL NFR BLD AUTO: 1.1 %
ERYTHROCYTE [DISTWIDTH] IN BLOOD BY AUTOMATED COUNT: 12.1 % (ref 10–15)
HCT VFR BLD AUTO: 39.7 % (ref 40–53)
HGB BLD-MCNC: 13.6 G/DL (ref 13.3–17.7)
IMM GRANULOCYTES # BLD: 0 10E9/L (ref 0–0.4)
IMM GRANULOCYTES NFR BLD: 0.3 %
LYMPHOCYTES # BLD AUTO: 2.2 10E9/L (ref 0.8–5.3)
LYMPHOCYTES NFR BLD AUTO: 31 %
MCH RBC QN AUTO: 29.5 PG (ref 26.5–33)
MCHC RBC AUTO-ENTMCNC: 34.3 G/DL (ref 31.5–36.5)
MCV RBC AUTO: 86 FL (ref 78–100)
MONOCYTES # BLD AUTO: 0.7 10E9/L (ref 0–1.3)
MONOCYTES NFR BLD AUTO: 9.8 %
NEUTROPHILS # BLD AUTO: 4.1 10E9/L (ref 1.6–8.3)
NEUTROPHILS NFR BLD AUTO: 57.5 %
PLATELET # BLD AUTO: 231 10E9/L (ref 150–450)
RBC # BLD AUTO: 4.61 10E12/L (ref 4.4–5.9)
WBC # BLD AUTO: 7.2 10E9/L (ref 4–11)

## 2018-09-13 PROCEDURE — 73130 X-RAY EXAM OF HAND: CPT | Mod: RT

## 2018-09-13 PROCEDURE — 86140 C-REACTIVE PROTEIN: CPT | Performed by: EMERGENCY MEDICINE

## 2018-09-13 PROCEDURE — 96372 THER/PROPH/DIAG INJ SC/IM: CPT | Performed by: EMERGENCY MEDICINE

## 2018-09-13 PROCEDURE — 85025 COMPLETE CBC W/AUTO DIFF WBC: CPT | Performed by: EMERGENCY MEDICINE

## 2018-09-13 PROCEDURE — 99283 EMERGENCY DEPT VISIT LOW MDM: CPT | Mod: Z6 | Performed by: EMERGENCY MEDICINE

## 2018-09-13 PROCEDURE — 99284 EMERGENCY DEPT VISIT MOD MDM: CPT | Mod: 25 | Performed by: EMERGENCY MEDICINE

## 2018-09-13 PROCEDURE — 36415 COLL VENOUS BLD VENIPUNCTURE: CPT | Performed by: EMERGENCY MEDICINE

## 2018-09-13 PROCEDURE — 25000128 H RX IP 250 OP 636: Performed by: EMERGENCY MEDICINE

## 2018-09-13 RX ORDER — SULFAMETHOXAZOLE/TRIMETHOPRIM 800-160 MG
1 TABLET ORAL 2 TIMES DAILY
Qty: 20 TABLET | Refills: 0 | Status: SHIPPED | OUTPATIENT
Start: 2018-09-13 | End: 2018-09-20

## 2018-09-13 RX ORDER — KETOROLAC TROMETHAMINE 30 MG/ML
30 INJECTION, SOLUTION INTRAMUSCULAR; INTRAVENOUS ONCE
Status: COMPLETED | OUTPATIENT
Start: 2018-09-13 | End: 2018-09-13

## 2018-09-13 RX ADMIN — KETOROLAC TROMETHAMINE 30 MG: 30 INJECTION, SOLUTION INTRAMUSCULAR at 07:40

## 2018-09-13 ASSESSMENT — ENCOUNTER SYMPTOMS
FATIGUE: 0
FEVER: 0
CHILLS: 0

## 2018-09-13 NOTE — ED AVS SNAPSHOT
[facility]    [address]    [place] [address]    Phone:  [phone]    Fax:  [phone]                                       Malik Melendez   MRN: [id number]    Department:  [facility]   Date of Visit:  9/13/2018           Patient Information     Date Of Birth          1975        Your diagnoses for this visit were:     Swelling of right hand        You were seen by Lonnie Amador MD.        Discharge Instructions       1. Take Ibuprofen 600-800 mg 2-3 times daily as needed for pain  2. May also take Tylenol thousand milligrams 3-4 times a day as needed for pain  3.  Take Bactrim and Augmentin as instructed  4.  Follow-up plastic surgeon, Dr. Gustafson in [place] early next week further care and recommendations; if you do not hear from Dr. Gustafson's office by tomorrow call his office at [phone]  5.  Return to ER if having progressively worsening symptoms    24 Hour Appointment Hotline     To schedule an appointment at [facility], please call [phone]. If you don't have a family doctor or clinic, we will help you find one. [facility] are conveniently located to serve the needs of you and your family.           Review of your medicines      START taking        Dose / Directions Last dose taken    amoxicillin-clavulanate 875-125 MG per tablet   Commonly known as:  AUGMENTIN   Dose:  1 tablet   Quantity:  20 tablet        Take 1 tablet by mouth 2 times daily for 10 days   Refills:  0        sulfamethoxazole-trimethoprim 800-160 MG per tablet   Commonly known as:  BACTRIM DS   Dose:  1 tablet   Quantity:  20 tablet        Take 1 tablet by mouth 2 times daily for 10 days   Refills:  0          Our records show that you are taking the medicines listed below. If these are incorrect, please call your family doctor or clinic.        Dose / Directions Last dose taken    atorvastatin 10 MG tablet   Commonly known as:  LIPITOR   Dose:  10 mg         Take 10 mg by mouth daily   Refills:  0        B-D U/F 31G X 8 MM   Generic drug:  insulin pen needle        4 times daily as needed   Refills:  0        AMANDA CONTOUR test strip   Generic drug:  blood glucose monitoring        4 times daily   Refills:  0        CIALIS 10 MG tablet   Quantity:  one month supply   Generic drug:  tadalafil        ONE TABLET, TAKEN AT LEAST 30 MINUTES BEFORE INTERCOURSE   Refills:  3        citalopram 40 MG tablet   Commonly known as:  celeXA        1 TABLET DAILY   Refills:  0        cloNIDine 0.1 MG tablet   Commonly known as:  CATAPRES        Refills:  0        DULoxetine 30 MG EC capsule   Commonly known as:  CYMBALTA        Refills:  0        gabapentin 300 MG capsule   Commonly known as:  NEURONTIN        Refills:  0        HUMALOG SC        None Entered   Refills:  0        insulin aspart 100 UNIT/ML injection   Commonly known as:  NovoLOG PEN        sliding scale as directed, max of 30 units daily   Refills:  0        * LANTUS SC        50 units daily   Refills:  0        * insulin glargine 100 UNIT/ML injection   Commonly known as:  LANTUS        E10.359 Inject 54 units SQ at bedtime   Refills:  0        lisinopril 20 MG tablet   Commonly known as:  PRINIVIL/ZESTRIL        Refills:  0        sulindac 150 MG tablet   Commonly known as:  CLINORIL        Refills:  0        syringe (disposable) 1 ML Misc        As directed. may use 1 per day for diabetic meds (lantus)   Refills:  0        thin lancets   Commonly known as:  NO BRAND SPECIFIED        As directed. Test 4 times per day.   Refills:  0        traMADol 50 MG tablet   Commonly known as:  ULTRAM   Dose:  50 mg        Take 50 mg by mouth every 6 hours as needed for pain   Refills:  0        * Notice:  This list has 2 medication(s) that are the same as other medications prescribed for you. Read the directions carefully, and ask your doctor or other care provider to review them with you.            Prescriptions were  "sent or printed at these locations (2 Prescriptions)                   [facility] [address] [place], [place] - [address] [address]    Telephone:  [phone]   Fax:  [phone]   Hours:                  Printed at Department/Unit printer (2 of 2)         amoxicillin-clavulanate (AUGMENTIN) 875-125 MG per tablet               sulfamethoxazole-trimethoprim (BACTRIM DS) 800-160 MG per tablet                Procedures and tests performed during your visit     CBC with platelets differential    CRP inflammation    XR Hand Right G/E 3 Views      Orders Needing Specimen Collection     None      Pending Results     No orders found from 9/11/2018 to 9/14/2018.            Pending Culture Results     No orders found from 9/11/2018 to 9/14/2018.            Pending Results Instructions     If you had any lab results that were not finalized at the time of your Discharge, you can call the ED Lab Result RN at [phone]. You will be contacted by this team for any positive Lab results or changes in treatment. The nurses are available 7 days a week from 10A to 6:30P.  You can leave a message 24 hours per day and they will return your call.        Thank you for choosing [facility]       Thank you for choosing [facility] for your care. Our goal is always to provide you with excellent care. Hearing back from our patients is one way we can continue to improve our services. Please take a few minutes to complete the written survey that you may receive in the mail after you visit with us. Thank you!        [employer] Information     [employer] lets you send messages to your doctor, view your test results, renew your prescriptions, schedule appointments and more. To sign up, go to [email or web address] . Click on \"Log in\" on the left side of the screen, which will take you to the Welcome page. Then click on \"Sign up Now\" on the right side of the page.     You will be asked to enter the " access code listed below, as well as some personal information. Please follow the directions to create your username and password.     Your access code is: [email or web address]  Expires: 2018  8:37 AM     Your access code will  in 90 days. If you need help or a new code, please call your [facility] clinic or [phone].        Care EveryWhere ID     This is your Care EveryWhere ID. This could be used by other organizations to access your [facility] medical records  [id number]        Equal Access to Services     [facility] Center[facility]AKELIN : Ismael Bal, brady valdez, tip maldonadoaljohanne alvarado, emily correia . So [facility] [phone].    ATENCIÓN: Si habla español, tiene a gallardo disposición servicios gratuitos de asistencia lingüística. Llame al [phone].    We comply with applicable federal civil rights laws and [place] laws. We do not discriminate on the basis of race, color, national origin, age, disability, sex, sexual orientation, or gender identity.            After Visit Summary       This is your record. Keep this with you and show to your community pharmacist(s) and doctor(s) at your next visit.

## 2018-09-13 NOTE — ED AVS SNAPSHOT
[facility]    [address]    [place] [address]    Phone:  [phone]    Fax:  [phone]                                       Malik Melendez   MRN: [id number]    Department:  [facility]   Date of Visit:  9/13/2018           After Visit Summary Signature Page     I have received my discharge instructions, and my questions have been answered. I have discussed any challenges I see with this plan with the nurse or doctor.    ..........................................................................................................................................  Patient/Patient Representative Signature      ..........................................................................................................................................  Patient Representative Print Name and Relationship to Patient    ..................................................               ................................................  Date                                   Time    ..........................................................................................................................................  Reviewed by Signature/Title    ...................................................              ..............................................  Date                                               Time          22EPIC Rev 08/18

## 2018-09-13 NOTE — ED TRIAGE NOTES
"ED Nursing Triage Note (General)   ________________________________    Malik Melendez is a 43 year old Male that presents to triage private car  With history of  Right hand swelling that began last evening, has small cut on finger but swelling is not near the area of swelling and painful reported by patient   Significant symptoms had onset 24 hour(s) ago.  /85  Pulse 86  Temp 97.4  F (36.3  C) (Tympanic)  Resp 14  Ht 1.753 m (5' 9\")  Wt 88.9 kg (196 lb)  SpO2 96%  BMI 28.94 kg/m2t  Patient appears alert , in mild distress., and cooperative behavi  GCS 15  Airway: intact  Breathing noted as Normal.  Circulation Normal  Skin normal  Action taken:  Triage to critical care immediately      PRE HOSPITAL PRIOR LIVING SITUATION Alone    COLUMBIA-SUICIDE SEVERITY RATING SCALE   Screen with Triage Points for Emergency Department      Ask questions that are bolded and underlined.   Past  month   Ask Questions 1 and 2 YES NO   1)  Have you wished you were dead or wished you could go to sleep and not wake up?   x   2)  Have you actually had any thoughts of killing yourself?   x   If YES to 2, ask questions 3, 4, 5, and 6.  If NO to 2, go directly to question 6.   3)  Have you been thinking about how you might do this?   E.g.  I thought about taking an overdose but I never made a specific plan as to when where or how I would actually do it .and I would never go through with it.    x   4)  Have you had these thoughts and had some intention of acting on them?   As opposed to  I have the thoughts but I definitely will not do anything about them.    x   5)  Have you started to work out or worked out the details of how to kill yourself? Do you intend to carry out this plan?   x   6)  Have you ever done anything, started to do anything, or prepared to do anything to end your life?  Examples: Collected pills, obtained a gun, gave away valuables, wrote a will or suicide note, took out pills but didn t swallow any, held a gun " but changed your mind or it was grabbed from your hand, went to the roof but didn t jump; or actually took pills, tried to shoot yourself, cut yourself, tried to hang yourself, etc.    If YES, ask: Was this within the past three months?  Lifetime     x    Past 3 Months        Item 1:  Behavioral Health Referral at Discharge  Item 2:  Behavioral Health Referral at Discharge   Item 3:  Behavioral Health Consult (Psychiatric Nurse/) and consider Patient Safety Precautions  Item 4:  Immediate Notification of Physician and/or Behavioral Health and Patient Safety Precautions   Item 5:  Immediate Notification of Physician and/or Behavioral Health and Patient Safety Precautions  Item 6:  Over 3 months ago: Behavioral Health Consult (Psychiatric Nurse/) and consider Patient Safety Precautions  OR  Item 6:  3 months ago or less: Immediate Notification of Physician and/or Behavioral Health and Patient Safety Precautions

## 2018-09-13 NOTE — DISCHARGE INSTRUCTIONS
1. Take Ibuprofen 600-800 mg 2-3 times daily as needed for pain  2. May also take Tylenol thousand milligrams 3-4 times a day as needed for pain  3.  Take Bactrim and Augmentin as instructed  4.  Follow-up plastic surgeon, Dr. Gustafson in [place] early next week further care and recommendations; if you do not hear from Dr. Gustafson's office by tomorrow call his office at [phone]  5.  Return to ER if having progressively worsening symptoms

## 2018-09-13 NOTE — ED PROVIDER NOTES
History     Chief Complaint   Patient presents with     Hand Pain     HPI Comments: This is a 43-year-old male who is coming to the emergency room with painful swelling of the right hand especially at the dorsum of the right hand which is today started about 2-3 days ago.  Patient states initially he had a small cut at the distal palmar aspect of the right fifth finger (the palmar aspect of the DIP joint) which he got while handling a plastic wrap at work that somebody pull from the other and too tight and caught him in that area and caused a little cut through the skin.  He applied Neosporin and tape over it.  He subsequently developed some discomfort at the dorsum of the hand and then swelling started to develop and get worse since then.  He stated hand feels very tight.  He denies fever or chills or body aches.  No discharge or erythema.  She is diabetic but he denies history of arthritis.      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 No pertinent past medical history.    Past Surgical History:    History reviewed. No pertinent surgical history.    Family History:    No family history on file.    Social History:  Marital Status:  Single [1]  Social History   Substance Use Topics     Smoking status: Never Smoker     Smokeless tobacco: Current User     Alcohol use Not on file        Medications:      amoxicillin-clavulanate (AUGMENTIN) 875-125 MG per tablet   atorvastatin (LIPITOR) 10 MG tablet   blood  "glucose monitoring (AMANDA CONTOUR) test strip   CITALOPRAM HYDROBROMIDE 40 MG OR TABS   cloNIDine (CATAPRES) 0.1 MG tablet   DULoxetine (CYMBALTA) 30 MG EC capsule   gabapentin (NEURONTIN) 300 MG capsule   HUMALOG SC   insulin aspart (NOVOLOG PEN) 100 UNIT/ML injection   insulin glargine (LANTUS) 100 UNIT/ML injection   insulin pen needle (B-D U/F) 31G X 8 MM   LANTUS SC   lisinopril (PRINIVIL/ZESTRIL) 20 MG tablet   sulfamethoxazole-trimethoprim (BACTRIM DS) 800-160 MG per tablet   sulindac (CLINORIL) 150 MG tablet   syringe, disposable, 1 ML MISC   thin (NO BRAND SPECIFIED) lancets   traMADol (ULTRAM) 50 MG tablet   CIALIS 10 MG OR TABS         Review of Systems   Constitutional: Negative for chills, fatigue and fever.   Musculoskeletal:        Painful right hand swelling   All other systems reviewed and are negative.      Physical Exam   BP: 111/85  Pulse: 86  Temp: 97.4  F (36.3  C)  Resp: 14  Height: 175.3 cm (5' 9\")  Weight: 88.9 kg (196 lb)  SpO2: 96 %      Physical Exam   Constitutional: He is oriented to person, place, and time. He appears well-developed and well-nourished. No distress.   Cardiovascular: Normal rate, regular rhythm, normal heart sounds and intact distal pulses.    Pulmonary/Chest: Effort normal and breath sounds normal. No respiratory distress. He has no wheezes. He has no rales. He exhibits no tenderness.   Abdominal: Soft. Bowel sounds are normal. He exhibits no distension. There is no tenderness. There is no rebound and no guarding.   Musculoskeletal: Normal range of motion. He exhibits no edema.   Diffuse swelling of the right hand especially at the dorsum of the right hand with tenderness on palpation without crepitance, erythema petechiae or ecchymoses.  There is a small linear almost healed laceration at the palmar aspect of the DIP of the right fifth finger with some tenderness but no purulent discharge or erythema.  Hand is neurovascularly intact   Neurological: He is oriented to " person, place, and time.       ED Course     Patient comes to the emergency room with complaints of diffusely swollen right hand with tenderness at the dorsum of the hand.  There is no erythema or increased warmth to the touch or crepitance to suggest an infection.  However, patient had a cut the distal palmar aspect of the right fifth finger a day or 2 prior to the starting of the swelling which may suggest infection as the cause.  Given the patient does a lot of physical work he does not recall any particular injury to the hand other than when he had to cut through the right fifth finger.  He applied Neosporin and put a Band-Aid over it and has not had any purulent discharge or increased tenderness at the site of the cut.  He is diabetic which increases the risk of him acquiring an infection.  Will obtain basic labs including blood culture CRP and CBC.  Patient received Toradol 30 mg IM.  Chest x-ray is also obtained which reveals normal WBC of 7.2 and segs of 57.5%, not indicating infection/inflmmation.  CRP is also within normal limits at 0.1 again suggesting against infection or significant inflammation.  However without a trauma and patient having significant swelling predated by a cut at the finger distal to the swelling factious cost would have to be considered.  Therefore after consultation with the plastic surgeon, Dr. Gustafson at [facility] in [place] patient was started with Bactrim as well as Augmentin to cover for possible MRSA and other typical microbes.  Patient will follow-up with Dr. Gustafson early next week.  Patient understands should he develop worsening symptoms he should return to ER.  He was offered a work note but he declined.    ED Course     Procedures               Critical Care time:  none               Results for orders placed or performed during the hospital encounter of 09/13/18 (from the past 24 hour(s))   CBC with platelets differential   Result Value Ref Range    WBC  7.2 4.0 - 11.0 10e9/L    RBC Count 4.61 4.4 - 5.9 10e12/L    Hemoglobin 13.6 13.3 - 17.7 g/dL    Hematocrit 39.7 (L) 40.0 - 53.0 %    MCV 86 78 - 100 fl    MCH 29.5 26.5 - 33.0 pg    MCHC 34.3 31.5 - 36.5 g/dL    RDW 12.1 10.0 - 15.0 %    Platelet Count 231 150 - 450 10e9/L    Diff Method Automated Method     % Neutrophils 57.5 %    % Lymphocytes 31.0 %    % Monocytes 9.8 %    % Eosinophils 1.1 %    % Basophils 0.3 %    % Immature Granulocytes 0.3 %    Absolute Neutrophil 4.1 1.6 - 8.3 10e9/L    Absolute Lymphocytes 2.2 0.8 - 5.3 10e9/L    Absolute Monocytes 0.7 0.0 - 1.3 10e9/L    Absolute Eosinophils 0.1 0.0 - 0.7 10e9/L    Absolute Basophils 0.0 0.0 - 0.2 10e9/L    Abs Immature Granulocytes 0.0 0 - 0.4 10e9/L   CRP inflammation   Result Value Ref Range    CRP Inflammation 0.1 <0.5 mg/L   XR Hand Right G/E 3 Views    Narrative    PROCEDURE:  XR HAND RT G/E 3 VW    HISTORY: painful right hand swelling; .    COMPARISON:  1/1/2008.    TECHNIQUE:  3 views left hand.    FINDINGS:  No fracture or dislocation is identified. The joint spaces  are preserved. No focal erosion is identified. No foreign body is  seen.       Impression    IMPRESSION: No acute fracture.      GOMEZ RODRIGUEZ MD       Medications   ketorolac (TORADOL) injection 30 mg (30 mg Intramuscular Given 9/13/18 0740)       Assessments & Plan (with Medical Decision Making)     I have reviewed the nursing notes.    I have reviewed the findings, diagnosis, plan and need for follow up with the patient.       New Prescriptions    AMOXICILLIN-CLAVULANATE (AUGMENTIN) 875-125 MG PER TABLET    Take 1 tablet by mouth 2 times daily for 10 days    SULFAMETHOXAZOLE-TRIMETHOPRIM (BACTRIM DS) 800-160 MG PER TABLET    Take 1 tablet by mouth 2 times daily for 10 days       Final diagnoses:   Swelling of right hand       9/13/2018   [facility]  09/13/18 0840

## 2018-09-20 ENCOUNTER — HOSPITAL ENCOUNTER (OUTPATIENT)
Dept: GENERAL RADIOLOGY | Facility: OTHER | Age: 43
Discharge: HOME | End: 2018-09-20
Attending: NURSE PRACTITIONER | Admitting: NURSE PRACTITIONER
Payer: MEDICAID

## 2018-09-20 ENCOUNTER — OFFICE VISIT (OUTPATIENT)
Dept: FAMILY MEDICINE | Facility: OTHER | Age: 43
End: 2018-09-20
Attending: NURSE PRACTITIONER
Payer: MEDICAID

## 2018-09-20 VITALS
DIASTOLIC BLOOD PRESSURE: 64 MMHG | BODY MASS INDEX: 27.83 KG/M2 | TEMPERATURE: 97.3 F | WEIGHT: 187.9 LBS | HEART RATE: 66 BPM | HEIGHT: 69 IN | RESPIRATION RATE: 16 BRPM | OXYGEN SATURATION: 96 % | SYSTOLIC BLOOD PRESSURE: 118 MMHG

## 2018-09-20 DIAGNOSIS — S92.414A CLOSED NONDISPLACED FRACTURE OF PROXIMAL PHALANX OF RIGHT GREAT TOE, INITIAL ENCOUNTER: ICD-10-CM

## 2018-09-20 DIAGNOSIS — M79.674 GREAT TOE PAIN, RIGHT: Primary | ICD-10-CM

## 2018-09-20 DIAGNOSIS — L03.031 PARONYCHIA OF TOE, RIGHT: ICD-10-CM

## 2018-09-20 PROCEDURE — G0463 HOSPITAL OUTPT CLINIC VISIT: HCPCS | Mod: 25

## 2018-09-20 PROCEDURE — 99213 OFFICE O/P EST LOW 20 MIN: CPT | Performed by: NURSE PRACTITIONER

## 2018-09-20 PROCEDURE — 73660 X-RAY EXAM OF TOE(S): CPT | Mod: RT

## 2018-09-20 PROCEDURE — G0463 HOSPITAL OUTPT CLINIC VISIT: HCPCS

## 2018-09-20 ASSESSMENT — PAIN SCALES - GENERAL: PAINLEVEL: WORST PAIN (10)

## 2018-09-20 NOTE — NURSING NOTE
"Patient presents to the clinic for right toe injury. States his stubbed his toe on Monday and has been painful since. Thinks it's broken.   Precious Sahni LPN............. September 20, 2018 2:09 PM       Chief Complaint   Patient presents with     Toe Injury       Initial /64 (BP Location: Left arm, Patient Position: Sitting, Cuff Size: Adult Regular)  Pulse 66  Temp 97.3  F (36.3  C) (Tympanic)  Resp 16  Ht 5' 9\" (1.753 m)  Wt 187 lb 14.4 oz (85.2 kg)  SpO2 96%  BMI 27.75 kg/m2 Estimated body mass index is 27.75 kg/(m^2) as calculated from the following:    Height as of this encounter: 5' 9\" (1.753 m).    Weight as of this encounter: 187 lb 14.4 oz (85.2 kg).  Medication Reconciliation: complete    Precious Sahni LPN   "

## 2018-09-20 NOTE — MR AVS SNAPSHOT
After Visit Summary   9/20/2018    Malik Melendez    MRN: [id number]           Patient Information     Date Of Birth          1975        Visit Information        Provider Department      9/20/2018 1:00 PM Verónica Monge NP [facility]        Today's Diagnoses     Great toe pain, right    -  1      Care Instructions    Tylenol 650-1000 mg every 6-8 hours    Ibuprofen 600 mg every 6 hours as needed for pain    Ice 20 minutes on, 3-6 times a day    After day three of injury can use heat 20 minutes on 3-6 times a day.     Buddy taping.       Paronychia of the Finger or Toe  Paronychia is an infection near a fingernail or toenail. It usually occurs when an opening in the cuticle or an ingrown toenail lets bacteria under the skin.  The infection will need to be drained if pus is present. If the infection has been caught early, you may need only antibiotic treatment. Healing will take about 1 to 2 weeks.  Home care  Follow these guidelines when caring for yourself at home:    Clean and soak the toe or finger. Do this 2 times a day for the first 3 days. To do so:  ? Soak your foot or hand in a tub of warm water for 5 minutes. Or hold your toe or finger under a faucet of warm running water for 5 minutes.  ? Clean any crust away with soap and water using a cotton swab.  ? Put antibiotic ointment on the infected area.    Change the dressing daily or any time it gets dirty.    If you were given antibiotics, take them as directed until they are all gone.    If your infection is on a toe, wear comfortable shoes with a lot of toe room. You can also wear open-toed sandals while your toe heals.    You may use over-the-counter medicine (acetaminophen or ibuprofen to help with pain, unless another medicine was prescribed. If you have chronic liver or kidney disease, talk with your healthcare provider before using these medicines. Also talk with your provider if you've had a stomach ulcer or GI  "(gastrointestinal) bleeding.  Prevention  The following can prevent paronychia:    Avoid cutting or playing with your cuticles at home.    Don't bite your nails.    Don't suck on your thumbs or fingers.  Follow-up care  Follow up with your healthcare provider, or as advised.  When to seek medical advice  Call your healthcare provider right away if any of these occur:    Redness, pain, or swelling of the finger or toe gets worse    Red streaks in the skin leading away from the wound    Pus or fluid draining from the nail area    Fever of 100.4 F (38 C) or higher, or as directed by your provider      You are on antibiotics that will treat this. There is not a fracture That I can see. If the radiologist changes the read, we will call you.                   Follow-ups after your visit        Who to contact     If you have questions or need follow up information about today's clinic visit or your schedule please contact [facility] directly at [phone].  Normal or non-critical lab and imaging results will be communicated to you by Jawbonehart, letter or phone within 4 business days after the clinic has received the results. If you do not hear from us within 7 days, please contact the clinic through Jawbonehart or phone. If you have a critical or abnormal lab result, we will notify you by phone as soon as possible.  Submit refill requests through [employer] or call your pharmacy and they will forward the refill request to us. Please allow 3 business days for your refill to be completed.          Additional Information About Your Visit        [employer] Information     [employer] lets you send messages to your doctor, view your test results, renew your prescriptions, schedule appointments and more. To sign up, go to [email or web address] . Click on \"Log in\" on the left side of the screen, which will take you to the Welcome page. Then click on \"Sign up Now\" on the right side of the page.     You will be asked to " "enter the access code listed below, as well as some personal information. Please follow the directions to create your username and password.     Your access code is: [email or web address]  Expires: 2018  8:37 AM     Your access code will  in 90 days. If you need help or a new code, please call your [place] clinic or [phone].        Care EveryWhere ID     This is your Care EveryWhere ID. This could be used by other organizations to access your [place] medical records  [id number]        Your Vitals Were     Pulse Temperature Respirations Height Pulse Oximetry BMI (Body Mass Index)    66 97.3  F (36.3  C) (Tympanic) 16 5' 9\" (1.753 m) 96% 27.75 kg/m2       Blood Pressure from Last 3 Encounters:   18 118/64   18 112/74   17 118/71    Weight from Last 3 Encounters:   18 187 lb 14.4 oz (85.2 kg)   18 196 lb (88.9 kg)   17 192 lb (87.1 kg)              We Performed the Following     XR Toe Right G/E 2 Views        Primary Care Provider Office Phone # Fax #    Josué Puentes [phone]       [facility] [address]  [facility] [address]        Equal Access to Services     [facility]: Hadii aad ku hadasho Soomaali, waaxda luqadaha, qaybta kaalmada adeparkeryavandana, emily correia . So [facility] [phone].    ATENCIÓN: Si habla español, tiene a gallardo disposición servicios gratuitos de asistencia lingüística. José Luis al [phone].    We comply with applicable federal civil rights laws and [place] laws. We do not discriminate on the basis of race, color, national origin, age, disability, sex, sexual orientation, or gender identity.            Thank you!     Thank you for choosing [facility]  for your care. Our goal is always to provide you with excellent care. Hearing back from our patients is one way we can continue to improve our services. Please take a few minutes to complete the written survey that you may " receive in the mail after your visit with us. Thank you!             Your Updated Medication List - Protect others around you: Learn how to safely use, store and throw away your medicines at www.disposemymeds.org.          This list is accurate as of 9/20/18  3:16 PM.  Always use your most recent med list.                   Brand Name Dispense Instructions for use Diagnosis    atorvastatin 10 MG tablet    LIPITOR     Take 10 mg by mouth daily        B-D U/F 31G X 8 MM   Generic drug:  insulin pen needle      4 times daily as needed        AMANDA CONTOUR test strip   Generic drug:  blood glucose monitoring      4 times daily        CIALIS 10 MG tablet   Generic drug:  tadalafil     one month supply    ONE TABLET, TAKEN AT LEAST 30 MINUTES BEFORE INTERCOURSE    Acute bronchitis, Impotence of organic origin       citalopram 40 MG tablet    celeXA     1 TABLET DAILY        cloNIDine 0.1 MG tablet    CATAPRES          DULoxetine 30 MG EC capsule    CYMBALTA          gabapentin 300 MG capsule    NEURONTIN          HUMALOG SC      None Entered        insulin aspart 100 UNIT/ML injection    NovoLOG PEN     sliding scale as directed, max of 30 units daily        * LANTUS SC      50 units daily        * insulin glargine 100 UNIT/ML injection    LANTUS     E10.359 Inject 54 units SQ at bedtime        lisinopril 20 MG tablet    PRINIVIL/ZESTRIL          sulindac 150 MG tablet    CLINORIL          syringe (disposable) 1 ML Misc      As directed. may use 1 per day for diabetic meds (lantus)        thin lancets    NO BRAND SPECIFIED     As directed. Test 4 times per day.        traMADol 50 MG tablet    ULTRAM     Take 50 mg by mouth every 6 hours as needed for pain        * Notice:  This list has 2 medication(s) that are the same as other medications prescribed for you. Read the directions carefully, and ask your doctor or other care provider to review them with you.

## 2018-09-20 NOTE — PROGRESS NOTES
"Nursing Notes:   Precious Sahni LPN  9/20/2018  2:36 PM  Signed  Patient presents to the clinic for right toe injury. States his stubbed his toe on Monday and has been painful since. Thinks it's broken.   Precious Sahni LPN............. September 20, 2018 2:09 PM       Chief Complaint   Patient presents with     Toe Injury       Initial /64 (BP Location: Left arm, Patient Position: Sitting, Cuff Size: Adult Regular)  Pulse 66  Temp 97.3  F (36.3  C) (Tympanic)  Resp 16  Ht 5' 9\" (1.753 m)  Wt 187 lb 14.4 oz (85.2 kg)  SpO2 96%  BMI 27.75 kg/m2 Estimated body mass index is 27.75 kg/(m^2) as calculated from the following:    Height as of this encounter: 5' 9\" (1.753 m).    Weight as of this encounter: 187 lb 14.4 oz (85.2 kg).  Medication Reconciliation: complete    Precious Sahni LPN     SUBJECTIVE:   Malik Melendez is a 43 year old male who presents to clinic today for the following health issues:    Musculoskeletal problem/pain      Duration: DOI 9/17/18    Description  Location: RT great toe    Intensity:  severe    Accompanying signs and symptoms: Pain  In great toe    History  Previous similar problem: no   Previous evaluation:  none    Precipitating or alleviating factors:  Trauma or overuse: YES- 9/17/18 he stubbed his toe. He would not elaborate more then this.   Aggravating factors include: standing and walking    Therapies tried and outcome: heat, ice, Ibuprofen and Ultram        Problem list and histories reviewed & adjusted, as indicated.  Additional history: as documented    Current Outpatient Prescriptions   Medication Sig Dispense Refill     atorvastatin (LIPITOR) 10 MG tablet Take 10 mg by mouth daily       blood glucose monitoring (AMANDA CONTOUR) test strip 4 times daily       CIALIS 10 MG OR TABS ONE TABLET, TAKEN AT LEAST 30 MINUTES BEFORE INTERCOURSE one month supply 3     CITALOPRAM HYDROBROMIDE 40 MG OR TABS 1 TABLET DAILY       cloNIDine (CATAPRES) 0.1 MG tablet " "       DULoxetine (CYMBALTA) 30 MG EC capsule        gabapentin (NEURONTIN) 300 MG capsule        HUMALOG SC None Entered       insulin aspart (NOVOLOG PEN) 100 UNIT/ML injection sliding scale as directed, max of 30 units daily       insulin glargine (LANTUS) 100 UNIT/ML injection E10.359 Inject 54 units SQ at bedtime       insulin pen needle (B-D U/F) 31G X 8 MM 4 times daily as needed       LANTUS SC 50 units daily       lisinopril (PRINIVIL/ZESTRIL) 20 MG tablet        sulindac (CLINORIL) 150 MG tablet        syringe, disposable, 1 ML MISC As directed. may use 1 per day for diabetic meds (lantus)       thin (NO BRAND SPECIFIED) lancets As directed. Test 4 times per day.       traMADol (ULTRAM) 50 MG tablet Take 50 mg by mouth every 6 hours as needed for pain       No Known Allergies      ROS:  Notable findings in the HPI.       OBJECTIVE:     /64 (BP Location: Left arm, Patient Position: Sitting, Cuff Size: Adult Regular)  Pulse 66  Temp 97.3  F (36.3  C) (Tympanic)  Resp 16  Ht 5' 9\" (1.753 m)  Wt 187 lb 14.4 oz (85.2 kg)  SpO2 96%  BMI 27.75 kg/m2  Body mass index is 27.75 kg/(m^2).  GENERAL: healthy, alert and no distress  EYES: Eyes grossly normal to inspection  RESP: Without increased work of breathing.   MS: Along the cuticle and lateral side there is a yellow pus pocket and mild redness on the distal tip. There is tenderness over this area. He has pain with ROM, Good pedal pulse, CMS intact. Minimal swelling  SKIN: no suspicious lesions or rashes    Diagnostic Test Results:  Results for orders placed or performed in visit on 09/20/18 (from the past 24 hour(s))   XR Toe Right G/E 2 Views    Narrative    XR TOE RIGHT G/E 2 VIEWS    HISTORY: 43 yearsMale stubbed toe; Great toe pain, right    TECHNIQUE: 3 views right toe    COMPARISON: None    FINDINGS: 3 views of the right first toe were obtained. There is a  nondisplaced fracture involving the base of the distal phalanx, dorsal  aspect. This may " involve the proximal articular surface. The  interphalangeal joint is otherwise congruent.      Impression    IMPRESSION: Nondisplaced distal phalanx fracture first toe involving  the proximal aspect and possibly the proximal articular surface.    JACQUELINE GRIMES MD     Completed Toe xray.  I personally reviewed the xray. There was no acute fracture noted upon initial read of xray.  Final read pending by radiology.    ASSESSMENT/PLAN:     1. Great toe pain, right  - XR Toe Right G/E 2 Views    2. Paronychia of toe, right  Covered on abx already. Home cares discussed.     3. Closed nondisplaced fracture of proximal phalanx of right great toe, initial encounter  Mikal tape, rest, OTC, good fitting shoes. Ice, NSAIDs.       PLAN:    MS Injury/Pain  ice, elevate, rest, Tylenol and Ibuprofen, F/U in 2 weeks with PCP.     Followup:    If not improving or if condition worsens, follow up with your Primary Care Provider    I explained my diagnostic considerations and recommendations to the patient, who voiced understanding and agreement with the treatment plan. All questions were answered. We discussed potential side effects of any prescribed or recommended therapies, as well as expectations for response to treatments. He was advised to contact our office if there is no improvement or worsening of conditions or symptoms.  If s/s worsen or persist, patient will either come back or follow up with PCP.    Disclaimer:  This note consists of words and symbols derived from keyboarding, dictation, or using voice recognition software. As a result, there may be errors in the script that have gone undetected. Please consider this when interpreting information found in this note.      Verónica Monge NP, 9/20/2018 2:32 PM

## 2018-09-20 NOTE — PATIENT INSTRUCTIONS
Tylenol 650-1000 mg every 6-8 hours    Ibuprofen 600 mg every 6 hours as needed for pain    Ice 20 minutes on, 3-6 times a day    After day three of injury can use heat 20 minutes on 3-6 times a day.     Buddy taping.       Paronychia of the Finger or Toe  Paronychia is an infection near a fingernail or toenail. It usually occurs when an opening in the cuticle or an ingrown toenail lets bacteria under the skin.  The infection will need to be drained if pus is present. If the infection has been caught early, you may need only antibiotic treatment. Healing will take about 1 to 2 weeks.  Home care  Follow these guidelines when caring for yourself at home:    Clean and soak the toe or finger. Do this 2 times a day for the first 3 days. To do so:  ? Soak your foot or hand in a tub of warm water for 5 minutes. Or hold your toe or finger under a faucet of warm running water for 5 minutes.  ? Clean any crust away with soap and water using a cotton swab.  ? Put antibiotic ointment on the infected area.    Change the dressing daily or any time it gets dirty.    If you were given antibiotics, take them as directed until they are all gone.    If your infection is on a toe, wear comfortable shoes with a lot of toe room. You can also wear open-toed sandals while your toe heals.    You may use over-the-counter medicine (acetaminophen or ibuprofen to help with pain, unless another medicine was prescribed. If you have chronic liver or kidney disease, talk with your healthcare provider before using these medicines. Also talk with your provider if you've had a stomach ulcer or GI (gastrointestinal) bleeding.  Prevention  The following can prevent paronychia:    Avoid cutting or playing with your cuticles at home.    Don't bite your nails.    Don't suck on your thumbs or fingers.  Follow-up care  Follow up with your healthcare provider, or as advised.  When to seek medical advice  Call your healthcare provider right away if any of  these occur:    Redness, pain, or swelling of the finger or toe gets worse    Red streaks in the skin leading away from the wound    Pus or fluid draining from the nail area    Fever of 100.4 F (38 C) or higher, or as directed by your provider      You are on antibiotics that will treat this. There is not a fracture That I can see. If the radiologist changes the read, we will call you.

## 2018-11-12 ENCOUNTER — HOSPITAL ENCOUNTER (EMERGENCY)
Facility: OTHER | Age: 43
Discharge: HOME | End: 2018-11-12
Attending: PHYSICIAN ASSISTANT | Admitting: PHYSICIAN ASSISTANT

## 2018-11-12 ENCOUNTER — APPOINTMENT (OUTPATIENT)
Dept: GENERAL RADIOLOGY | Facility: OTHER | Age: 43
End: 2018-11-12
Attending: PHYSICIAN ASSISTANT

## 2018-11-12 VITALS
TEMPERATURE: 98.4 F | BODY MASS INDEX: 28.88 KG/M2 | SYSTOLIC BLOOD PRESSURE: 146 MMHG | RESPIRATION RATE: 20 BRPM | HEART RATE: 112 BPM | OXYGEN SATURATION: 98 % | DIASTOLIC BLOOD PRESSURE: 79 MMHG | WEIGHT: 195 LBS | HEIGHT: 69 IN

## 2018-11-12 DIAGNOSIS — S20.212A CHEST WALL CONTUSION, LEFT, INITIAL ENCOUNTER: ICD-10-CM

## 2018-11-12 DIAGNOSIS — R07.89 LEFT-SIDED CHEST WALL PAIN: ICD-10-CM

## 2018-11-12 DIAGNOSIS — S20.212A RIB CONTUSION, LEFT, INITIAL ENCOUNTER: ICD-10-CM

## 2018-11-12 PROCEDURE — 25000128 H RX IP 250 OP 636: Performed by: PHYSICIAN ASSISTANT

## 2018-11-12 PROCEDURE — 40000275 ZZH STATISTIC RCP TIME EA 10 MIN

## 2018-11-12 PROCEDURE — 99284 EMERGENCY DEPT VISIT MOD MDM: CPT | Mod: 25 | Performed by: PHYSICIAN ASSISTANT

## 2018-11-12 PROCEDURE — 99283 EMERGENCY DEPT VISIT LOW MDM: CPT | Mod: Z6 | Performed by: PHYSICIAN ASSISTANT

## 2018-11-12 PROCEDURE — 71101 X-RAY EXAM UNILAT RIBS/CHEST: CPT | Mod: LT

## 2018-11-12 PROCEDURE — 96372 THER/PROPH/DIAG INJ SC/IM: CPT | Performed by: PHYSICIAN ASSISTANT

## 2018-11-12 RX ORDER — KETOROLAC TROMETHAMINE 30 MG/ML
60 INJECTION, SOLUTION INTRAMUSCULAR; INTRAVENOUS ONCE
Status: COMPLETED | OUTPATIENT
Start: 2018-11-12 | End: 2018-11-12

## 2018-11-12 RX ORDER — IBUPROFEN 800 MG/1
800 TABLET, FILM COATED ORAL EVERY 8 HOURS PRN
Qty: 60 TABLET | Refills: 0 | Status: SHIPPED | OUTPATIENT
Start: 2018-11-12 | End: 2018-11-21

## 2018-11-12 RX ORDER — HYDROCODONE BITARTRATE AND ACETAMINOPHEN 5; 325 MG/1; MG/1
1 TABLET ORAL EVERY 6 HOURS PRN
Qty: 15 TABLET | Refills: 0 | Status: SHIPPED | OUTPATIENT
Start: 2018-11-12 | End: 2020-02-25

## 2018-11-12 RX ADMIN — KETOROLAC TROMETHAMINE 60 MG: 30 INJECTION, SOLUTION INTRAMUSCULAR at 20:52

## 2018-11-12 ASSESSMENT — ENCOUNTER SYMPTOMS
SHORTNESS OF BREATH: 0
HEADACHES: 0
CONFUSION: 0
DIFFICULTY URINATING: 0
FEVER: 0
EYE REDNESS: 0
NECK STIFFNESS: 0
ARTHRALGIAS: 0
ABDOMINAL PAIN: 0
COLOR CHANGE: 0

## 2018-11-12 NOTE — ED AVS SNAPSHOT
[facility]    [address]    [place] [address]    Phone:  [phone]    Fax:  [phone]                                       Malik Melendez   MRN: [id number]    Department:  [facility]   Date of Visit:  11/12/2018           After Visit Summary Signature Page     I have received my discharge instructions, and my questions have been answered. I have discussed any challenges I see with this plan with the nurse or doctor.    ..........................................................................................................................................  Patient/Patient Representative Signature      ..........................................................................................................................................  Patient Representative Print Name and Relationship to Patient    ..................................................               ................................................  Date                                   Time    ..........................................................................................................................................  Reviewed by Signature/Title    ...................................................              ..............................................  Date                                               Time          22EPIC Rev 08/18

## 2018-11-12 NOTE — LETTER
[facility]  [address]  [place] [address]  Phone: [phone]  Fax: [phone]    November 12, 2018        Malik Melendez  [address]   [place] [address]          To whom it may concern:    RE: Malik Melendez    Patient was seen and treated today at our ER.  He will need next week off to recover from his injuries.  He may return to work on 11/19/2018.  He may return sooner if his symptoms improve.    Please contact me for questions or concerns.      Sincerely,                    Declan Brewer MPA, PA-C

## 2018-11-12 NOTE — ED AVS SNAPSHOT
[facility]    [address]    [place] [address]    Phone:  [phone]    Fax:  [phone]                                       Malik Melendez   MRN: [id number]    Department:  [facility]   Date of Visit:  11/12/2018           Patient Information     Date Of Birth          1975        Your diagnoses for this visit were:     Chest wall contusion, left, initial encounter     Rib contusion, left, initial encounter     Left-sided chest wall pain        You were seen by Declan Brewer PA-C.      Follow-up Information     Schedule an appointment as soon as possible for a visit with Josué Puentes MD.    Specialty:  Speciality Unknown    Why:  As needed, If symptoms worsen    Contact information:    [facility]  [id number]  [facility] [address]  [phone]          Discharge Instructions         Chest Wall Contusion (Child)  The chest wall runs from the shoulders to the diaphragm or bottom of the ribs. It includes the front and back of the rib cage. It also includes the breastbone, shoulders, and collarbones. A blunt trauma (such as during a car accident or fall) can injure the chest wall. This injury is called a chest wall contusion.  Injury to the chest wall may result in bruising and swelling. It may also result in broken ribs and injured muscles. These cause pain, often during breathing. If one or more ribs are broken in several areas, the chest wall may become unstable and painful. This may cause serious breathing trouble.  In the emergency room or urgent care center, any broken bones or other injuries will be assessed. Your child will likely be given medicine for pain. Broken ribs usually heal without further treatment. A broken shoulder or collarbone may be taped or supported with a sling.  Home care    The child s provider may prescribe medicines for pain or swelling. Follow the provider s instructions for giving these medicines  to your child. Don't use additional or other pain medicines without first consulting your healthcare provider.    Allow your child to rest as needed. Give pain medicine before an activity or sleeping at night.    Change a sling, tape, or dressings as advised by the healthcare provider.    Position your child so that he or she is as comfortable as possible.    Follow the healthcare provider s instructions for putting ice or heat on the injury.    Have your child hold a pillow against the chest to ease pain when breathing and coughing.  Follow-up care  Follow up with your child s healthcare provider, or as advised.  Special notes to parents    A child s chest wall is very flexible. During an injury, more force may be placed on the internal organs, such as the lungs and heart, than on the chest wall bones. As a result, a child s chest wall may look fine even if there are serious internal injuries. So always get your child medical attention for a serious blow to the chest wall.    After being injured in a car accident or by a fall, your child may have fears and nightmares about the injury. This can last for several months to many years. If the fear affects your child s ability to function, talk to the child s provider. Therapy or other help may be needed.  When to seek medical advice  Call your child's healthcare provider if your child has any of the following:    Continuing or worsening pain not relieved by pain medicine    Trouble breathing, shortness of breath, or fast breathing    Swelling or bruising that doesn t go away or gets worse    Signs of infection such as increased redness or swelling, worsening pain, or foul-smelling drainage from a wound  Date Last Reviewed: 7/1/2017 2000-2018 [employer]. [address] [place] [address], [place] [address]. All rights reserved. This information is not intended as a substitute for professional medical care. Always follow your healthcare professional's  instructions.          Discharge References/Attachments     RIB CONTUSION (ENGLISH)      24 Hour Appointment Hotline     To schedule an appointment at [facility], please call [phone]. If you don't have a family doctor or clinic, we will help you find one. [facility] clinics are conveniently located to serve the needs of you and your family.           Review of your medicines      START taking        Dose / Directions Last dose taken    HYDROcodone-acetaminophen 5-325 MG per tablet   Commonly known as:  NORCO   Dose:  1 tablet   Quantity:  15 tablet        Take 1 tablet by mouth every 6 hours as needed for moderate to severe pain   Refills:  0        ibuprofen 800 MG tablet   Commonly known as:  ADVIL/MOTRIN   Dose:  800 mg   Quantity:  60 tablet        Take 1 tablet (800 mg) by mouth every 8 hours as needed for moderate pain   Refills:  0          Our records show that you are taking the medicines listed below. If these are incorrect, please call your family doctor or clinic.        Dose / Directions Last dose taken    atorvastatin 10 MG tablet   Commonly known as:  LIPITOR   Dose:  10 mg        Take 10 mg by mouth daily   Refills:  0        B-D U/F 31G X 8 MM   Generic drug:  insulin pen needle        4 times daily as needed   Refills:  0        AMANDA CONTOUR test strip   Generic drug:  blood glucose monitoring        4 times daily   Refills:  0        CIALIS 10 MG tablet   Quantity:  one month supply   Generic drug:  tadalafil        ONE TABLET, TAKEN AT LEAST 30 MINUTES BEFORE INTERCOURSE   Refills:  3        citalopram 40 MG tablet   Commonly known as:  celeXA        1 TABLET DAILY   Refills:  0        cloNIDine 0.1 MG tablet   Commonly known as:  CATAPRES        Refills:  0        DULoxetine 30 MG EC capsule   Commonly known as:  CYMBALTA        Refills:  0        gabapentin 300 MG capsule   Commonly known as:  NEURONTIN        Refills:  0        HUMALOG SC        None Entered   Refills:  0        insulin  aspart 100 UNIT/ML injection   Commonly known as:  NovoLOG PEN        sliding scale as directed, max of 30 units daily   Refills:  0        * LANTUS SC        50 units daily   Refills:  0        * insulin glargine 100 UNIT/ML injection   Commonly known as:  LANTUS        E10.359 Inject 54 units SQ at bedtime   Refills:  0        lisinopril 20 MG tablet   Commonly known as:  PRINIVIL/ZESTRIL        Refills:  0        sulindac 150 MG tablet   Commonly known as:  CLINORIL        Refills:  0        syringe (disposable) 1 ML Misc        As directed. may use 1 per day for diabetic meds (lantus)   Refills:  0        thin lancets   Commonly known as:  NO BRAND SPECIFIED        As directed. Test 4 times per day.   Refills:  0        traMADol 50 MG tablet   Commonly known as:  ULTRAM   Dose:  50 mg        Take 50 mg by mouth every 6 hours as needed for pain   Refills:  0        * Notice:  This list has 2 medication(s) that are the same as other medications prescribed for you. Read the directions carefully, and ask your doctor or other care provider to review them with you.            Information about OPIOIDS     PRESCRIPTION OPIOIDS: WHAT YOU NEED TO KNOW   We gave you an opioid (narcotic) pain medicine. It is important to manage your pain, but opioids are not always the best choice. You should first try all the other options your care team gave you. Take this medicine for as short a time (and as few doses) as possible.    Some activities can increase your pain, such as bandage changes or therapy sessions. It may help to take your pain medicine 30 to 60 minutes before these activities. Reduce your stress by getting enough sleep, working on hobbies you enjoy and practicing relaxation or meditation. Talk to your care team about ways to manage your pain beyond prescription opioids.    These medicines have risks:    DO NOT drive when on new or higher doses of pain medicine. These medicines can affect your alertness and reaction  times, and you could be arrested for driving under the influence (DUI). If you need to use opioids long-term, talk to your care team about driving.    DO NOT operate heavy machinery    DO NOT do any other dangerous activities while taking these medicines.    DO NOT drink any alcohol while taking these medicines.     If the opioid prescribed includes acetaminophen, DO NOT take with any other medicines that contain acetaminophen. Read all labels carefully. Look for the word  acetaminophen  or  Tylenol.  Ask your pharmacist if you have questions or are unsure.    You can get addicted to pain medicines, especially if you have a history of addiction (chemical, alcohol or substance dependence). Talk to your care team about ways to reduce this risk.    All opioids tend to cause constipation. Drink plenty of water and eat foods that have a lot of fiber, such as fruits, vegetables, prune juice, apple juice and high-fiber cereal. Take a laxative (Miralax, milk of magnesia, Colace, Senna) if you don t move your bowels at least every other day. Other side effects include upset stomach, sleepiness, dizziness, throwing up, tolerance (needing more of the medicine to have the same effect), physical dependence and slowed breathing.    Store your pills in a secure place, locked if possible. We will not replace any lost or stolen medicine. If you don t finish your medicine, please throw away (dispose) as directed by your pharmacist. The [place] [employer] has more information about safe disposal: https://[email or web address].[facility].[place].us/living-green/managing-unwanted-medications        Prescriptions were sent or printed at these locations (2 Prescriptions)                   [facility] [address] [place] [address]    Telephone:  [phone]   Fax:  [phone]   Hours:                  Printed at Department/Unit printer (2 of 2)          "HYDROcodone-acetaminophen (NORCO) 5-325 MG per tablet               ibuprofen (ADVIL/MOTRIN) 800 MG tablet                Procedures and tests performed during your visit     XR Ribs & Chest Lt 3v      Orders Needing Specimen Collection     None      Pending Results     No orders found from 11/10/2018 to 2018.            Pending Culture Results     No orders found from 11/10/2018 to 2018.            Pending Results Instructions     If you had any lab results that were not finalized at the time of your Discharge, you can call the ED Lab Result RN at [phone]. You will be contacted by this team for any positive Lab results or changes in treatment. The nurses are available 7 days a week from 10A to 6:30P.  You can leave a message 24 hours per day and they will return your call.        Thank you for choosing [facility]       Thank you for choosing [facility] for your care. Our goal is always to provide you with excellent care. Hearing back from our patients is one way we can continue to improve our services. Please take a few minutes to complete the written survey that you may receive in the mail after you visit with us. Thank you!        C[employer]let Information     [employer] lets you send messages to your doctor, view your test results, renew your prescriptions, schedule appointments and more. To sign up, go to [email or web address] . Click on \"Log in\" on the left side of the screen, which will take you to the Welcome page. Then click on \"Sign up Now\" on the right side of the page.     You will be asked to enter the access code listed below, as well as some personal information. Please follow the directions to create your username and password.     Your access code is: [email or web address]  Expires: 2018  7:37 AM     Your access code will  in 90 days. If you need help or a new code, please call your [facility] clinic or [phone].        Care EveryWhere ID     This is your Care EveryWhere ID. This could be " used by other organizations to access your [facility] medical records  [id number]        Equal Access to Services     MELIZA HUMPHREY : Ismael Bal, brady valdez, emily nelson. So [facility] [phone].    ATENCIÓN: Si habla español, tiene a gallardo disposición servicios gratuitos de asistencia lingüística. Llame al [phone].    We comply with applicable federal civil rights laws and [place] laws. We do not discriminate on the basis of race, color, national origin, age, disability, sex, sexual orientation, or gender identity.            After Visit Summary       This is your record. Keep this with you and show to your community pharmacist(s) and doctor(s) at your next visit.

## 2018-11-13 NOTE — ED TRIAGE NOTES
Pt comes in after falling down on the frozen ground last Friday. Pt initially had pain in ribs and left shoulder but now just left ribs.    Honey Munguia RN on 11/12/2018 at 8:41 PM

## 2018-11-13 NOTE — DISCHARGE INSTRUCTIONS
Chest Wall Contusion (Child)  The chest wall runs from the shoulders to the diaphragm or bottom of the ribs. It includes the front and back of the rib cage. It also includes the breastbone, shoulders, and collarbones. A blunt trauma (such as during a car accident or fall) can injure the chest wall. This injury is called a chest wall contusion.  Injury to the chest wall may result in bruising and swelling. It may also result in broken ribs and injured muscles. These cause pain, often during breathing. If one or more ribs are broken in several areas, the chest wall may become unstable and painful. This may cause serious breathing trouble.  In the emergency room or urgent care center, any broken bones or other injuries will be assessed. Your child will likely be given medicine for pain. Broken ribs usually heal without further treatment. A broken shoulder or collarbone may be taped or supported with a sling.  Home care    The child s provider may prescribe medicines for pain or swelling. Follow the provider s instructions for giving these medicines to your child. Don't use additional or other pain medicines without first consulting your healthcare provider.    Allow your child to rest as needed. Give pain medicine before an activity or sleeping at night.    Change a sling, tape, or dressings as advised by the healthcare provider.    Position your child so that he or she is as comfortable as possible.    Follow the healthcare provider s instructions for putting ice or heat on the injury.    Have your child hold a pillow against the chest to ease pain when breathing and coughing.  Follow-up care  Follow up with your child s healthcare provider, or as advised.  Special notes to parents    A child s chest wall is very flexible. During an injury, more force may be placed on the internal organs, such as the lungs and heart, than on the chest wall bones. As a result, a child s chest wall may look fine even if there are  serious internal injuries. So always get your child medical attention for a serious blow to the chest wall.    After being injured in a car accident or by a fall, your child may have fears and nightmares about the injury. This can last for several months to many years. If the fear affects your child s ability to function, talk to the child s provider. Therapy or other help may be needed.  When to seek medical advice  Call your child's healthcare provider if your child has any of the following:    Continuing or worsening pain not relieved by pain medicine    Trouble breathing, shortness of breath, or fast breathing    Swelling or bruising that doesn t go away or gets worse    Signs of infection such as increased redness or swelling, worsening pain, or foul-smelling drainage from a wound  Date Last Reviewed: 7/1/2017 2000-2018 [employer]. [address] [place] [address]. All rights reserved. This information is not intended as a substitute for professional medical care. Always follow your healthcare professional's instructions.

## 2018-11-13 NOTE — ED PROVIDER NOTES
History   No chief complaint on file.    HPI Comments: Is a 43-year-old male who 2 days ago was out at a junk yard when he slipped on some frozen ground landing on his left shoulder and left chest wall.  Initially had pain in the shoulder but this has improved over the past couple days.  He does have continued left chest wall pain.  He has increased pain with deep inspiration.  Denies any nausea or vomiting.  No difficulty breathing.  No lightheadedness or dizziness.  He is here for further evaluation.    The history is provided by the patient.         Problem List:    Patient Active Problem List    Diagnosis Date Noted     Bilateral carpal tunnel syndrome 08/19/2016     Priority: Medium     Overview:   S/p Surgery       Ava nodes (DJD hand) 08/19/2016     Priority: Medium     Hyperlipidemia 05/10/2010     Priority: Medium     Generalized anxiety disorder 07/01/2009     Priority: Medium     Major depressive disorder, recurrent episode, moderate (H) 11/26/2008     Priority: Medium     Diabetes mellitus type I (H) 09/19/2008     Priority: Medium        Past Medical History:    No past medical history on file.    Past Surgical History:    No past surgical history on file.    Family History:    No family history on file.    Social History:  Marital Status:  Single [1]  Social History   Substance Use Topics     Smoking status: Never Smoker     Smokeless tobacco: Current User     Alcohol use No        Medications:      atorvastatin (LIPITOR) 10 MG tablet   CITALOPRAM HYDROBROMIDE 40 MG OR TABS   cloNIDine (CATAPRES) 0.1 MG tablet   DULoxetine (CYMBALTA) 30 MG EC capsule   HUMALOG SC   insulin aspart (NOVOLOG PEN) 100 UNIT/ML injection   insulin glargine (LANTUS) 100 UNIT/ML injection   lisinopril (PRINIVIL/ZESTRIL) 20 MG tablet   sulindac (CLINORIL) 150 MG tablet   traMADol (ULTRAM) 50 MG tablet   blood glucose monitoring (AMANDA CONTOUR) test strip   CIALIS 10 MG OR TABS   gabapentin (NEURONTIN) 300 MG capsule  "  insulin pen needle (B-D U/F) 31G X 8 MM   LANTUS SC   syringe, disposable, 1 ML MISC   thin (NO BRAND SPECIFIED) lancets         Review of Systems   Constitutional: Negative for fever.   HENT: Negative for congestion.    Eyes: Negative for redness.   Respiratory: Negative for shortness of breath.    Cardiovascular: Negative for chest pain.        Left chest wall pain   Gastrointestinal: Negative for abdominal pain.   Genitourinary: Negative for difficulty urinating.   Musculoskeletal: Negative for arthralgias and neck stiffness.   Skin: Negative for color change.   Neurological: Negative for headaches.   Psychiatric/Behavioral: Negative for confusion.       Physical Exam   BP: 146/79  Pulse: 112  Temp: 98.4  F (36.9  C)  Resp: 20  Height: 175.3 cm (5' 9\")  Weight: 88.5 kg (195 lb)  SpO2: 98 %      Physical Exam   Constitutional: He is oriented to person, place, and time. No distress.   HENT:   Head: Atraumatic.   Mouth/Throat: Oropharynx is clear and moist. No oropharyngeal exudate.   Eyes: Pupils are equal, round, and reactive to light. No scleral icterus.   Cardiovascular: Normal heart sounds and intact distal pulses.    Pulmonary/Chest: Breath sounds normal. No respiratory distress. He exhibits tenderness.   Lung sounds clear.  SaO2 is 98% on room air.  Appears in no respiratory distress.  No crepitus or SQE.   Abdominal: Soft. Bowel sounds are normal. There is no tenderness.   Musculoskeletal: Normal range of motion. He exhibits no edema or tenderness.   Neurological: He is alert and oriented to person, place, and time.   Skin: Skin is warm. No rash noted. He is not diaphoretic.       ED Course     ED Course     Procedures             Results for orders placed or performed during the hospital encounter of 11/12/18 (from the past 24 hour(s))   XR Ribs & Chest Lt 3v    Narrative    EXAM:    XR Left Ribs with PA Chest, 3 Views     EXAM DATE/TIME:    11/12/2018 8:50 PM     CLINICAL HISTORY:    43 years old, male; " Injury or trauma; Fall; Initial encounter; Rib area, left   side; Blunt trauma     TECHNIQUE:    XR Left ribs 3 views with PA chest.     COMPARISON:    No relevant prior studies available.     FINDINGS:    Lungs:  No consolidation. Small calcified granuloma left base laterally.    Pleural space: Unremarkable. No pleural effusion. No pneumothorax.    Heart/Mediastinum: Unremarkable. No cardiomegaly.    Bones/joints: Unremarkable.       Impression    IMPRESSION:   No acute rib fracture seen.    THIS DOCUMENT HAS BEEN ELECTRONICALLY SIGNED BY MOHAN BROWN MD       Medications   ketorolac (TORADOL) injection 60 mg (60 mg Intramuscular Given 11/12/18 2052)       Assessments & Plan (with Medical Decision Making)     I have reviewed the nursing notes.    I have reviewed the findings, diagnosis, plan and need for follow up with the patient.      Discharge Medication List as of 11/12/2018  9:34 PM      START taking these medications    Details   HYDROcodone-acetaminophen (NORCO) 5-325 MG per tablet Take 1 tablet by mouth every 6 hours as needed for moderate to severe pain, Disp-15 tablet, R-0, Local Print      ibuprofen (ADVIL/MOTRIN) 800 MG tablet Take 1 tablet (800 mg) by mouth every 8 hours as needed for moderate pain, Disp-60 tablet, R-0, Local Print             Final diagnoses:   Chest wall contusion, left, initial encounter   Rib contusion, left, initial encounter   Left-sided chest wall pain     Afebrile.  Vital signs stable.  Fall left chest wall contusion.  Chest x-ray and rib x-rays show no obvious fractures.  He was given Toradol IM for pain relief.  He was instructed in incentive spirometry and its importance.  Rx for Motrin and Norco.  Work note written.  Follow-up with his primary care provider if symptoms persist for further evaluation as needed.  Follow-up sooner if there is any concerns problems or questions  11/12/2018   [facility]     Declan Brewer PA-C  11/12/18 [id number]

## 2018-11-15 ENCOUNTER — OFFICE VISIT (OUTPATIENT)
Dept: FAMILY MEDICINE | Facility: OTHER | Age: 43
End: 2018-11-15
Attending: FAMILY MEDICINE

## 2018-11-15 VITALS
HEART RATE: 64 BPM | WEIGHT: 195 LBS | BODY MASS INDEX: 28.8 KG/M2 | DIASTOLIC BLOOD PRESSURE: 84 MMHG | SYSTOLIC BLOOD PRESSURE: 162 MMHG

## 2018-11-15 DIAGNOSIS — R07.89 CHEST WALL PAIN: Primary | ICD-10-CM

## 2018-11-15 PROCEDURE — 99213 OFFICE O/P EST LOW 20 MIN: CPT | Performed by: FAMILY MEDICINE

## 2018-11-15 ASSESSMENT — PAIN SCALES - GENERAL: PAINLEVEL: MODERATE PAIN (5)

## 2018-11-15 ASSESSMENT — PATIENT HEALTH QUESTIONNAIRE - PHQ9: SUM OF ALL RESPONSES TO PHQ QUESTIONS 1-9: 0

## 2018-11-15 NOTE — NURSING NOTE
Patient here for recheck of ER from falling on his ribs 11/12/18 he would like to be cleared to return to work.  He says the ribs are getting better. Medication Reconciliation: complete.    Joan Dominguez LPN  11/15/2018 2:47 PM

## 2018-11-15 NOTE — MR AVS SNAPSHOT
"              After Visit Summary   11/15/2018    Malik Melendez    MRN: [id number]           Patient Information     Date Of Birth          1975        Visit Information        Provider Department      11/15/2018 2:30 PM Dao Jennings MD [facility]        Today's Diagnoses     Chest wall pain    -  1       Follow-ups after your visit        Who to contact     If you have questions or need follow up information about today's clinic visit or your schedule please contact [facility] directly at [phone].  Normal or non-critical lab and imaging results will be communicated to you by OneTwoTriphart, letter or phone within 4 business days after the clinic has received the results. If you do not hear from us within 7 days, please contact the clinic through [employer] or phone. If you have a critical or abnormal lab result, we will notify you by phone as soon as possible.  Submit refill requests through [employer] or call your pharmacy and they will forward the refill request to us. Please allow 3 business days for your refill to be completed.          Additional Information About Your Visit        MyChart Information     [employer] lets you send messages to your doctor, view your test results, renew your prescriptions, schedule appointments and more. To sign up, go to [email or web address] . Click on \"Log in\" on the left side of the screen, which will take you to the Welcome page. Then click on \"Sign up Now\" on the right side of the page.     You will be asked to enter the access code listed below, as well as some personal information. Please follow the directions to create your username and password.     Your access code is: [email or web address]  Expires: 2018  7:37 AM     Your access code will  in 90 days. If you need help or a new code, please call your [facility] or [phone].        Care EveryWhere ID     This is your Care EveryWhere ID. This could be used by other " organizations to access your [place] medical records  [id number]        Your Vitals Were     Pulse BMI (Body Mass Index)                64 28.8 kg/m2           Blood Pressure from Last 3 Encounters:   11/15/18 162/84   11/12/18 146/79   09/20/18 118/64    Weight from Last 3 Encounters:   11/15/18 195 lb (88.5 kg)   11/12/18 195 lb (88.5 kg)   09/20/18 187 lb 14.4 oz (85.2 kg)              Today, you had the following     No orders found for display         Today's Medication Changes          These changes are accurate as of 11/15/18 11:59 PM.  If you have any questions, ask your nurse or doctor.               These medicines have changed or have updated prescriptions.        Dose/Directions    insulin glargine 100 UNIT/ML injection   Commonly known as:  LANTUS   This may have changed:  Another medication with the same name was removed. Continue taking this medication, and follow the directions you see here.   Changed by:  Dao Jennings MD        E10.359 Inject 54 units SQ at bedtime   Refills:  0         Stop taking these medicines if you haven't already. Please contact your care team if you have questions.     CIALIS 10 MG tablet   Generic drug:  tadalafil   Stopped by:  Dao Jennings MD           gabapentin 300 MG capsule   Commonly known as:  NEURONTIN   Stopped by:  Dao Jennings MD           traMADol 50 MG tablet   Commonly known as:  ULTRAM   Stopped by:  Dao Jennings MD                    Primary Care Provider Office Phone # Fax #    Josué Puentes [phone]       [place] [address] [address] [facility] [address]  [facility] [address]        Equal Access to Services     [facility]: Hadii aad ku hadasho Soomaali, waaxda luqadaha, qaybta kaalmada adeegyada, emily idiin hayaan adeeg kharash la'aan . So [facility] [phone].    ATENCIÓN: Si habla español, tiene a gallardo disposición servicios gratuitos de asistencia lingüística. Llame al [phone].    We comply with applicable federal civil  rights laws and [place] laws. We do not discriminate on the basis of race, color, national origin, age, disability, sex, sexual orientation, or gender identity.            Thank you!     Thank you for choosing [facility] AND hospitals  for your care. Our goal is always to provide you with excellent care. Hearing back from our patients is one way we can continue to improve our services. Please take a few minutes to complete the written survey that you may receive in the mail after your visit with us. Thank you!             Your Updated Medication List - Protect others around you: Learn how to safely use, store and throw away your medicines at www.disposemymeds.org.          This list is accurate as of 11/15/18 11:59 PM.  Always use your most recent med list.                   Brand Name Dispense Instructions for use Diagnosis    atorvastatin 10 MG tablet    LIPITOR     Take 10 mg by mouth daily        B-D U/F 31G X 8 MM   Generic drug:  insulin pen needle      4 times daily as needed        AMANDA CONTOUR test strip   Generic drug:  blood glucose monitoring      4 times daily        citalopram 40 MG tablet    celeXA     1 TABLET DAILY        cloNIDine 0.1 MG tablet    CATAPRES          DULoxetine 30 MG EC capsule    CYMBALTA          HUMALOG SC      None Entered        HYDROcodone-acetaminophen 5-325 MG per tablet    NORCO    15 tablet    Take 1 tablet by mouth every 6 hours as needed for moderate to severe pain        ibuprofen 800 MG tablet    ADVIL/MOTRIN    60 tablet    Take 1 tablet (800 mg) by mouth every 8 hours as needed for moderate pain        insulin aspart 100 UNIT/ML injection    NovoLOG PEN     sliding scale as directed, max of 30 units daily        insulin glargine 100 UNIT/ML injection    LANTUS     E10.359 Inject 54 units SQ at bedtime        lisinopril 20 MG tablet    PRINIVIL/ZESTRIL          sulindac 150 MG tablet    CLINORIL          syringe (disposable) 1 ML Misc      As directed. may  use 1 per day for diabetic meds (lantus)        thin lancets    NO BRAND SPECIFIED     As directed. Test 4 times per day.

## 2018-11-16 PROBLEM — R07.89 CHEST WALL PAIN: Status: ACTIVE | Noted: 2018-11-16

## 2018-11-16 NOTE — PROGRESS NOTES
SUBJECTIVE:   Malik Melendez is a 43 year old male who presents to clinic today for the following health issues: Follow-up fall    HPI Comments: On the 12th he had fallen [place] a junk [place].  States his feet flew out from under him.  He landed on his left side of his chest.  He was seen in the ER x-rays were negative for fracture.  He has shown some little improvement.Patient reports he needs a work slip to get back to work to start Sunday.  He works at [employer].  Rates the pain as a 5 out of 10        Patient Active Problem List    Diagnosis Date Noted     Bilateral carpal tunnel syndrome 08/19/2016     Priority: Medium     Overview:   S/p Surgery       Ava nodes (DJD hand) 08/19/2016     Priority: Medium     Hyperlipidemia 05/10/2010     Priority: Medium     Generalized anxiety disorder 07/01/2009     Priority: Medium     Major depressive disorder, recurrent episode, moderate (H) 11/26/2008     Priority: Medium     Diabetes mellitus type I (H) 09/19/2008     Priority: Medium     No past medical history on file.   No past surgical history on file.    Review of Systems     OBJECTIVE:     /84 (BP Location: Right arm, Patient Position: Sitting)  Pulse 64  Wt 195 lb (88.5 kg)  BMI 28.8 kg/m2  Body mass index is 28.8 kg/(m^2).  Physical Exam   Constitutional: He appears well-developed.   Cardiovascular: Normal rate and regular rhythm.    Pulmonary/Chest: Effort normal and breath sounds normal. He exhibits tenderness.   Chest tenderness is located on the left side.  No crepitus lungs were clear       none     ASSESSMENT/PLAN:         1. Chest wall pain  Work slip given.  Advised there is still could be an occult fracture they are not seen on x-ray.  Follow-up if needed.        Dao Jennings MD  [facility]

## 2018-11-21 ENCOUNTER — APPOINTMENT (OUTPATIENT)
Dept: GENERAL RADIOLOGY | Facility: OTHER | Age: 43
End: 2018-11-21
Attending: FAMILY MEDICINE

## 2018-11-21 ENCOUNTER — HOSPITAL ENCOUNTER (EMERGENCY)
Facility: OTHER | Age: 43
Discharge: HOME | End: 2018-11-21
Admitting: FAMILY MEDICINE

## 2018-11-21 VITALS
BODY MASS INDEX: 28.88 KG/M2 | RESPIRATION RATE: 16 BRPM | WEIGHT: 195 LBS | OXYGEN SATURATION: 96 % | DIASTOLIC BLOOD PRESSURE: 98 MMHG | SYSTOLIC BLOOD PRESSURE: 158 MMHG | HEIGHT: 69 IN | HEART RATE: 107 BPM | TEMPERATURE: 97 F

## 2018-11-21 DIAGNOSIS — S20.212A RIB CONTUSION, LEFT, INITIAL ENCOUNTER: ICD-10-CM

## 2018-11-21 DIAGNOSIS — R07.81 RIB PAIN ON LEFT SIDE: ICD-10-CM

## 2018-11-21 PROCEDURE — 96372 THER/PROPH/DIAG INJ SC/IM: CPT | Performed by: FAMILY MEDICINE

## 2018-11-21 PROCEDURE — 99283 EMERGENCY DEPT VISIT LOW MDM: CPT | Mod: Z6 | Performed by: FAMILY MEDICINE

## 2018-11-21 PROCEDURE — 99284 EMERGENCY DEPT VISIT MOD MDM: CPT | Mod: 25 | Performed by: FAMILY MEDICINE

## 2018-11-21 PROCEDURE — 71101 X-RAY EXAM UNILAT RIBS/CHEST: CPT | Mod: LT

## 2018-11-21 PROCEDURE — 25000128 H RX IP 250 OP 636: Performed by: FAMILY MEDICINE

## 2018-11-21 RX ORDER — KETOROLAC TROMETHAMINE 30 MG/ML
60 INJECTION, SOLUTION INTRAMUSCULAR; INTRAVENOUS ONCE
Status: COMPLETED | OUTPATIENT
Start: 2018-11-21 | End: 2018-11-21

## 2018-11-21 RX ORDER — KETOROLAC TROMETHAMINE 10 MG/1
10 TABLET, FILM COATED ORAL EVERY 6 HOURS PRN
Qty: 20 TABLET | Refills: 0 | Status: SHIPPED | OUTPATIENT
Start: 2018-11-21 | End: 2020-02-25

## 2018-11-21 RX ADMIN — KETOROLAC TROMETHAMINE 60 MG: 30 INJECTION, SOLUTION INTRAMUSCULAR at 04:03

## 2018-11-21 ASSESSMENT — ENCOUNTER SYMPTOMS
WHEEZING: 0
CONSTITUTIONAL NEGATIVE: 1
NEUROLOGICAL NEGATIVE: 1
CHOKING: 0
STRIDOR: 0
PSYCHIATRIC NEGATIVE: 1
SHORTNESS OF BREATH: 1
CHEST TIGHTNESS: 0
GASTROINTESTINAL NEGATIVE: 1
COUGH: 0

## 2018-11-21 NOTE — DISCHARGE INSTRUCTIONS
Chest Contusion    A contusion is a bruise to the skin, muscle, or ribs. It may cause pain, tenderness, and swelling. It may turn the skin purple until it heals. Contusions take a few days to a few weeks to heal.  Home care  Follow these guidelines when caring for yourself at home:    Rest. Don t do any heavy lifting or strenuous activity. Don t do any activity that causes pain.    Put an ice pack on the injured area. Do this for 20 minutes every 1 to 2 hours the first day. You can make an ice pack by wrapping a plastic bag of ice cubes in a thin towel. Continue to use the ice pack 3 to 4 times a day for the next 2 days. Then use the ice pack as needed to ease pain and swelling.    After 1 to 2 days you may put a warm compress on the area. Do this for 10 minutes several times a day. A warm compress is a clean cloth that s damp with warm water.    Hold a pillow to the affected area when you cough. This will help ease pain.    You may use over-the-counter pain medicine such as acetaminophen or ibuprofen to control pain, unless another pain medicine was prescribed. If you have chronic liver or kidney disease, talk with your healthcare provider before using these medicines. Also talk with your provider if you ve had a stomach ulcer or gastrointestinal bleeding.  Follow-up care  Follow up with your healthcare provider, or as advised.  When to seek medical advice  Call your healthcare provider right away if any of these occur:    New abdominal pain or abdominal pain that gets worse    Fever of 100.4 F (38 C) or higher, or as directed by your healthcare provider  Call 911  Call 911 if any of these occur:     Dizziness, weakness, or fainting    Shortness of breath, trouble breathing, or breathing fast    Chest pain gets worse when you breathe    Severe pain that comes on suddenly or lasts more than an hour  Date Last Reviewed: 5/1/2017 2000-2018 [employer]. [address] [facility], [place] [address]. All  rights reserved. This information is not intended as a substitute for professional medical care. Always follow your healthcare professional's instructions.

## 2018-11-21 NOTE — ED AVS SNAPSHOT
[facility]    [address]    [place] [address]    Phone:  [phone]    Fax:  [phone]                                       Malik Melendez   MRN: [id number]    Department:  [facility]   Date of Visit:  11/21/2018           Patient Information     Date Of Birth          1975        Your diagnoses for this visit were:     Rib pain on left side     Rib contusion, left, initial encounter       Follow-up Information     Follow up with Josué Puentes MD.    Specialty:  Speciality Unknown    Why:  As needed, If symptoms worsen or are not improving     Contact information:    [facility]  [address]  [facility] [address]  [phone]          Discharge Instructions         Chest Contusion    A contusion is a bruise to the skin, muscle, or ribs. It may cause pain, tenderness, and swelling. It may turn the skin purple until it heals. Contusions take a few days to a few weeks to heal.  Home care  Follow these guidelines when caring for yourself at home:    Rest. Don t do any heavy lifting or strenuous activity. Don t do any activity that causes pain.    Put an ice pack on the injured area. Do this for 20 minutes every 1 to 2 hours the first day. You can make an ice pack by wrapping a plastic bag of ice cubes in a thin towel. Continue to use the ice pack 3 to 4 times a day for the next 2 days. Then use the ice pack as needed to ease pain and swelling.    After 1 to 2 days you may put a warm compress on the area. Do this for 10 minutes several times a day. A warm compress is a clean cloth that s damp with warm water.    Hold a pillow to the affected area when you cough. This will help ease pain.    You may use over-the-counter pain medicine such as acetaminophen or ibuprofen to control pain, unless another pain medicine was prescribed. If you have chronic liver or kidney disease, talk with your healthcare provider before using these medicines.  Also talk with your provider if you ve had a stomach ulcer or gastrointestinal bleeding.  Follow-up care  Follow up with your healthcare provider, or as advised.  When to seek medical advice  Call your healthcare provider right away if any of these occur:    New abdominal pain or abdominal pain that gets worse    Fever of 100.4 F (38 C) or higher, or as directed by your healthcare provider  Call 911  Call 911 if any of these occur:     Dizziness, weakness, or fainting    Shortness of breath, trouble breathing, or breathing fast    Chest pain gets worse when you breathe    Severe pain that comes on suddenly or lasts more than an hour  Date Last Reviewed: 5/1/2017 2000-2018 [employer]. [address] [place] [address], [place] [address]. All rights reserved. This information is not intended as a substitute for professional medical care. Always follow your healthcare professional's instructions.          24 Hour Appointment Hotline     To schedule an appointment at [facility], please call [phone]. If you don't have a family doctor or clinic, we will help you find one. [facility] clinics are conveniently located to serve the needs of you and your family.           Review of your medicines      START taking        Dose / Directions Last dose taken    ketorolac 10 MG tablet   Commonly known as:  TORADOL   Dose:  10 mg   Quantity:  20 tablet        Take 1 tablet (10 mg) by mouth every 6 hours as needed for moderate pain   Refills:  0          Our records show that you are taking the medicines listed below. If these are incorrect, please call your family doctor or clinic.        Dose / Directions Last dose taken    atorvastatin 10 MG tablet   Commonly known as:  LIPITOR   Dose:  10 mg        Take 10 mg by mouth daily   Refills:  0        B-D U/F 31G X 8 MM miscellaneous   Generic drug:  insulin pen needle        4 times daily as needed   Refills:  0        AMANDA CONTOUR test strip   Generic drug:  blood glucose  monitoring        4 times daily   Refills:  0        citalopram 40 MG tablet   Commonly known as:  celeXA        1 TABLET DAILY   Refills:  0        cloNIDine 0.1 MG tablet   Commonly known as:  CATAPRES        Refills:  0        DULoxetine 30 MG EC capsule   Commonly known as:  CYMBALTA        Refills:  0        HUMALOG SC        None Entered   Refills:  0        HYDROcodone-acetaminophen 5-325 MG per tablet   Commonly known as:  NORCO   Dose:  1 tablet   Quantity:  15 tablet        Take 1 tablet by mouth every 6 hours as needed for moderate to severe pain   Refills:  0        ibuprofen 800 MG tablet   Commonly known as:  ADVIL/MOTRIN   Dose:  800 mg   Quantity:  60 tablet        Take 1 tablet (800 mg) by mouth every 8 hours as needed for moderate pain   Refills:  0        insulin aspart 100 UNIT/ML injection   Commonly known as:  NovoLOG PEN        sliding scale as directed, max of 30 units daily   Refills:  0        insulin glargine 100 UNIT/ML injection   Commonly known as:  LANTUS        E10.359 Inject 54 units SQ at bedtime   Refills:  0        lisinopril 20 MG tablet   Commonly known as:  PRINIVIL/ZESTRIL        Refills:  0        sulindac 150 MG tablet   Commonly known as:  CLINORIL        Refills:  0        syringe (disposable) 1 ML Misc        As directed. may use 1 per day for diabetic meds (lantus)   Refills:  0        thin lancets   Commonly known as:  NO BRAND SPECIFIED        As directed. Test 4 times per day.   Refills:  0                Prescriptions were sent or printed at these locations (1 Prescription)                   [facility]   [address] [place] [address]    Telephone:     Fax:     Hours:                  [facility] (1 of 1)         ketorolac (TORADOL) 10 MG tablet                Procedures and tests performed during your visit     XR Ribs & Chest Lt 3v      Orders Needing Specimen Collection     None      Pending Results     Date and Time Order  "Name Status Description    2018 0355 XR Ribs & Chest Lt 3v In process             Pending Culture Results     No orders found from 2018 to 2018.            Pending Results Instructions     If you had any lab results that were not finalized at the time of your Discharge, you can call the ED Lab Result RN at [phone]. You will be contacted by this team for any positive Lab results or changes in treatment. The nurses are available 7 days a week from 10A to 6:30P.  You can leave a message 24 hours per day and they will return your call.        Thank you for choosing [facility]       Thank you for choosing [facility] for your care. Our goal is always to provide you with excellent care. Hearing back from our patients is one way we can continue to improve our services. Please take a few minutes to complete the written survey that you may receive in the mail after you visit with us. Thank you!        The [employer] Information     [employer] lets you send messages to your doctor, view your test results, renew your prescriptions, schedule appointments and more. To sign up, go to [email or web address] . Click on \"Log in\" on the left side of the screen, which will take you to the Welcome page. Then click on \"Sign up Now\" on the right side of the page.     You will be asked to enter the access code listed below, as well as some personal information. Please follow the directions to create your username and password.     Your access code is: [email or web address]  Expires: 2018  7:37 AM     Your access code will  in 90 days. If you need help or a new code, please call your [facility] clinic or [phone].        Care EveryWhere ID     This is your Care EveryWhere ID. This could be used by other organizations to access your [facility] medical records  [id number]        Equal Access to Services     MELIZA HUMPHREY AH: Ismael Bal, brady valdez, emily nelson " la'janell sharon. So [facility] [phone].    ATENCIÓN: Si habla español, tiene a gallardo disposición servicios gratuitos de asistencia lingüística. Llame al [phone].    We comply with applicable federal civil rights laws and [place] laws. We do not discriminate on the basis of race, color, national origin, age, disability, sex, sexual orientation, or gender identity.            After Visit Summary       This is your record. Keep this with you and show to your community pharmacist(s) and doctor(s) at your next visit.

## 2018-11-21 NOTE — ED AVS SNAPSHOT
[facility]    [address]    [place] [address]    Phone:  [phone]    Fax:  [phone]                                       Malik Melendez   MRN: [id number]    Department:  [facility]   Date of Visit:  11/21/2018           After Visit Summary Signature Page     I have received my discharge instructions, and my questions have been answered. I have discussed any challenges I see with this plan with the nurse or doctor.    ..........................................................................................................................................  Patient/Patient Representative Signature      ..........................................................................................................................................  Patient Representative Print Name and Relationship to Patient    ..................................................               ................................................  Date                                   Time    ..........................................................................................................................................  Reviewed by Signature/Title    ...................................................              ..............................................  Date                                               Time          22EPIC Rev 08/18

## 2018-11-21 NOTE — ED PROVIDER NOTES
History     Chief Complaint   Patient presents with     Rib Pain     HPI  Malik Melendez is a 43 year old male who presents with increased rib pain . Was seen 1 week ago after he had fallen . NO rib fractures were seen at the time. Today he was moving some things around in the garage and aggravated his pain . Patient was at work and pain just got too severe so work sent him to be evaluated . Has been using heat and applying topical patches.  Has not had symptoms of pneumonia.  Has some sob . Has been compliant with his IS . Not running a fever.     Problem List:    Patient Active Problem List    Diagnosis Date Noted     Chest wall pain 11/16/2018     Priority: Medium     Bilateral carpal tunnel syndrome 08/19/2016     Priority: Medium     Overview:   S/p Surgery       Ava nodes (DJD hand) 08/19/2016     Priority: Medium     Hyperlipidemia 05/10/2010     Priority: Medium     Generalized anxiety disorder 07/01/2009     Priority: Medium     Major depressive disorder, recurrent episode, moderate (H) 11/26/2008     Priority: Medium     Diabetes mellitus type I (H) 09/19/2008     Priority: Medium        Past Medical History:    No past medical history on file.    Past Surgical History:    No past surgical history on file.    Family History:    No family history on file.    Social History:  Marital Status:  Single [1]  Social History   Substance Use Topics     Smoking status: Never Smoker     Smokeless tobacco: Current User     Alcohol use No        Medications:      atorvastatin (LIPITOR) 10 MG tablet   blood glucose monitoring (AMANDA CONTOUR) test strip   CITALOPRAM HYDROBROMIDE 40 MG OR TABS   cloNIDine (CATAPRES) 0.1 MG tablet   DULoxetine (CYMBALTA) 30 MG EC capsule   HUMALOG SC   HYDROcodone-acetaminophen (NORCO) 5-325 MG per tablet   insulin aspart (NOVOLOG PEN) 100 UNIT/ML injection   insulin glargine (LANTUS) 100 UNIT/ML injection   insulin pen needle (B-D U/F) 31G X 8 MM   lisinopril (PRINIVIL/ZESTRIL) 20  "MG tablet   sulindac (CLINORIL) 150 MG tablet   syringe, disposable, 1 ML MISC   thin (NO BRAND SPECIFIED) lancets         Review of Systems   Constitutional: Negative.    HENT: Negative.    Respiratory: Positive for shortness of breath. Negative for cough, choking, chest tightness, wheezing and stridor.    Cardiovascular: Positive for chest pain.   Gastrointestinal: Negative.    Genitourinary: Negative.    Musculoskeletal:        Left posterior rib pain    Neurological: Negative.    Psychiatric/Behavioral: Negative.        Physical Exam   BP: (!) 158/98  Pulse: 107  Temp: 97  F (36.1  C)  Resp: 16  Height: 175.3 cm (5' 9\")  Weight: 88.5 kg (195 lb)  SpO2: 96 %      Physical Exam   Constitutional: He is oriented to person, place, and time. He appears well-developed and well-nourished. No distress.   HENT:   Head: Normocephalic and atraumatic.   Cardiovascular:   Murmur heard.  Sinus tachycardia    Pulmonary/Chest: Breath sounds normal. No respiratory distress. He has no wheezes. He has no rales. He exhibits tenderness.   Abdominal: Soft.   Musculoskeletal: Normal range of motion.   Neurological: He is alert and oriented to person, place, and time.   Skin: Skin is warm and dry. He is not diaphoretic.   Psychiatric: He has a normal mood and affect.   Nursing note and vitals reviewed.      ED Course     ED Course     Procedures          Patient presents to ER with complaint of increased rib pain after injury 1-2 weeks ago . Patient states he over did it today and had to leave work early secondary to discomfort. He presents for reevaluation He is requesting a toradol injection .  Patient triaged to exam room . NO fever  No hypoxia NO tachycardia or respiratory distress Rib and chest xray ordered to reeevaluate his injury  . NO acute injury noted. NO pneumothorax Patient discharge from ER after toradol njection . Patient instructed to continue IS as instructed at time of injury . Follow up with PCP if symptoms not " improving in next couple weeks or if he should develop increasing sob or symptoms of pneumonia   Results for orders placed or performed during the hospital encounter of 11/21/18   XR Ribs & Chest Lt 3v    Narrative    PROCEDURE:  XR RIBS & CHEST LT 3VW    HISTORY: rib pain recent injury; , .    COMPARISON:  11/12/18.    FINDINGS:  The cardiomediastinal contours are normal.  The trachea is midline.  No focal consolidation, effusion or pneumothorax.    No acute left rib fracture is identified.      Impression    IMPRESSION:      No evidence of an acute left rib fracture or pneumothorax    GOMEZ RODRIGUEZ MD       No results found for this or any previous visit (from the past 24 hour(s)).    Medications - No data to display    Assessments & Plan (with Medical Decision Making)     I have reviewed the nursing notes.    I have reviewed the findings, diagnosis, plan and need for follow up with the patient.      New Prescriptions    No medications on file       Final diagnoses:   None   (R07.81) Rib pain on left side      (S20.212A) Rib contusion, left, initial encounter        11/21/2018   [facility] AND \[facility]\"" Marilee Simons MD  11/22/18 8893

## 2018-11-21 NOTE — ED TRIAGE NOTES
Pt states about 1-2 weeks ago he fell injuring his left side, no known rib injury per pt. Tonight he was at work and left upper side began hurting worse making it difficult to take a deep breath. States he may have re-injured them. Has tried ice hot patch, 800mg ibuprofen @ 0300.

## 2020-02-25 ENCOUNTER — OFFICE VISIT (OUTPATIENT)
Dept: FAMILY MEDICINE | Facility: OTHER | Age: 45
End: 2020-02-25
Attending: NURSE PRACTITIONER
Payer: COMMERCIAL

## 2020-02-25 VITALS
BODY MASS INDEX: 27.16 KG/M2 | DIASTOLIC BLOOD PRESSURE: 82 MMHG | HEIGHT: 69 IN | OXYGEN SATURATION: 98 % | TEMPERATURE: 97.4 F | HEART RATE: 99 BPM | SYSTOLIC BLOOD PRESSURE: 122 MMHG | RESPIRATION RATE: 16 BRPM | WEIGHT: 183.38 LBS

## 2020-02-25 DIAGNOSIS — J06.9 VIRAL URI WITH COUGH: Primary | ICD-10-CM

## 2020-02-25 PROCEDURE — 99213 OFFICE O/P EST LOW 20 MIN: CPT | Performed by: NURSE PRACTITIONER

## 2020-02-25 ASSESSMENT — MIFFLIN-ST. JEOR: SCORE: 1702.16

## 2020-02-25 ASSESSMENT — PAIN SCALES - GENERAL: PAINLEVEL: NO PAIN (0)

## 2020-02-25 NOTE — NURSING NOTE
Patient presents to clinic today for a cough.     No LMP for male patient.  Medication Reconciliation: complete    Kamilla Duff LPN  2/25/2020 3:15 PM

## 2020-02-25 NOTE — PROGRESS NOTES
"HPI:    Malik Melendez is a 45 year old male who presents to clinic today for cough.  He reports sinus congestion and postnasal drainage, feeling hot and cold, productive cough and body aches for the past week.  He has been using antihistamines and Flonase for symptomatic management.  He is a type I diabetic and has noticed blood sugars are slightly elevated.  He did not get a flu shot this year.  He reports that a coworker had similar influenza-like symptoms and he had close contact with him.    History reviewed. No pertinent past medical history.      Current Outpatient Medications   Medication Sig Dispense Refill     atorvastatin (LIPITOR) 10 MG tablet Take 10 mg by mouth daily       blood glucose monitoring (AMANDA CONTOUR) test strip 4 times daily       CITALOPRAM HYDROBROMIDE 40 MG OR TABS 1 TABLET DAILY       cloNIDine (CATAPRES) 0.1 MG tablet        DULoxetine (CYMBALTA) 30 MG EC capsule        HUMALOG SC None Entered       insulin aspart (NOVOLOG PEN) 100 UNIT/ML injection sliding scale as directed, max of 30 units daily       insulin glargine (LANTUS) 100 UNIT/ML injection E10.359 Inject 54 units SQ at bedtime       insulin pen needle (B-D U/F) 31G X 8 MM 4 times daily as needed       lisinopril (PRINIVIL/ZESTRIL) 20 MG tablet        sulindac (CLINORIL) 150 MG tablet        syringe, disposable, 1 ML MISC As directed. may use 1 per day for diabetic meds (lantus)       thin (NO BRAND SPECIFIED) lancets As directed. Test 4 times per day.         No Known Allergies    ROS:  Pertinent positives and negatives are noted in HPI.    EXAM:  /82 (BP Location: Left arm, Patient Position: Sitting, Cuff Size: Adult Regular)   Pulse 99   Temp 97.4  F (36.3  C) (Tympanic)   Resp 16   Ht 1.753 m (5' 9\")   Wt 83.2 kg (183 lb 6 oz)   SpO2 98%   BMI 27.08 kg/m    General appearance: well appearing male, in no acute distress  Head: normocephalic, atraumatic  Ears: TM's with cone of light, no erythema, canals clear " bilaterally  Eyes: conjunctivae normal  Oropharynx: moist mucous membranes, tonsils without erythema, exudates or petechiae, no post nasal drip seen  Neck: supple without adenopathy  Respiratory: clear to auscultation bilaterally, no cough, no respiratory distress  Cardiac: RRR with no murmurs  Psychological: normal affect, alert and pleasant      ASSESSMENT AND PLAN:    1. Viral URI with cough      Exam is stable.  Viral upper respiratory with cough noted at this time.  Symptoms possibly related to influenza but due to length of symptoms no need for testing, he is out of the window for treatment.  Discussed symptomatic management as well as signs and symptoms that would warrant follow-up in clinic.    ALEXANDRO Dumont CNP..................2/25/2020 3:22 PM      This document was prepared using voice generated software.  While every attempt was made for accuracy, grammatical errors may exist.

## 2021-02-04 ENCOUNTER — OFFICE VISIT (OUTPATIENT)
Dept: FAMILY MEDICINE | Facility: OTHER | Age: 46
End: 2021-02-04
Attending: PHYSICIAN ASSISTANT
Payer: MEDICAID

## 2021-02-04 VITALS
OXYGEN SATURATION: 95 % | DIASTOLIC BLOOD PRESSURE: 70 MMHG | RESPIRATION RATE: 20 BRPM | SYSTOLIC BLOOD PRESSURE: 120 MMHG | WEIGHT: 192 LBS | BODY MASS INDEX: 28.35 KG/M2 | TEMPERATURE: 97.7 F | HEART RATE: 85 BPM

## 2021-02-04 DIAGNOSIS — K62.5 RECTAL BLEEDING: Primary | ICD-10-CM

## 2021-02-04 LAB
ANION GAP SERPL CALCULATED.3IONS-SCNC: 9 MMOL/L (ref 3–14)
BASOPHILS # BLD AUTO: 0 10E9/L (ref 0–0.2)
BASOPHILS NFR BLD AUTO: 0.4 %
BUN SERPL-MCNC: 21 MG/DL (ref 7–25)
CALCIUM SERPL-MCNC: 8.9 MG/DL (ref 8.6–10.3)
CHLORIDE SERPL-SCNC: 93 MMOL/L (ref 98–107)
CO2 SERPL-SCNC: 25 MMOL/L (ref 21–31)
CREAT SERPL-MCNC: 1.12 MG/DL (ref 0.7–1.3)
DIFFERENTIAL METHOD BLD: NORMAL
EOSINOPHIL # BLD AUTO: 0.1 10E9/L (ref 0–0.7)
EOSINOPHIL NFR BLD AUTO: 0.9 %
ERYTHROCYTE [DISTWIDTH] IN BLOOD BY AUTOMATED COUNT: 11.7 % (ref 10–15)
GFR SERPL CREATININE-BSD FRML MDRD: 70 ML/MIN/{1.73_M2}
GLUCOSE SERPL-MCNC: 595 MG/DL (ref 70–105)
HCT VFR BLD AUTO: 40.4 % (ref 40–53)
HGB BLD-MCNC: 14.3 G/DL (ref 13.3–17.7)
IMM GRANULOCYTES # BLD: 0 10E9/L (ref 0–0.4)
IMM GRANULOCYTES NFR BLD: 0.4 %
LYMPHOCYTES # BLD AUTO: 2.3 10E9/L (ref 0.8–5.3)
LYMPHOCYTES NFR BLD AUTO: 29.7 %
MCH RBC QN AUTO: 30.1 PG (ref 26.5–33)
MCHC RBC AUTO-ENTMCNC: 35.4 G/DL (ref 31.5–36.5)
MCV RBC AUTO: 85 FL (ref 78–100)
MONOCYTES # BLD AUTO: 0.5 10E9/L (ref 0–1.3)
MONOCYTES NFR BLD AUTO: 6.8 %
NEUTROPHILS # BLD AUTO: 4.8 10E9/L (ref 1.6–8.3)
NEUTROPHILS NFR BLD AUTO: 61.8 %
PLATELET # BLD AUTO: 270 10E9/L (ref 150–450)
POTASSIUM SERPL-SCNC: 4.8 MMOL/L (ref 3.5–5.1)
RBC # BLD AUTO: 4.75 10E12/L (ref 4.4–5.9)
SODIUM SERPL-SCNC: 127 MMOL/L (ref 134–144)
WBC # BLD AUTO: 7.8 10E9/L (ref 4–11)

## 2021-02-04 PROCEDURE — 80048 BASIC METABOLIC PNL TOTAL CA: CPT | Mod: ZL | Performed by: PHYSICIAN ASSISTANT

## 2021-02-04 PROCEDURE — 99214 OFFICE O/P EST MOD 30 MIN: CPT | Performed by: PHYSICIAN ASSISTANT

## 2021-02-04 PROCEDURE — 36415 COLL VENOUS BLD VENIPUNCTURE: CPT | Mod: ZL | Performed by: PHYSICIAN ASSISTANT

## 2021-02-04 PROCEDURE — G0463 HOSPITAL OUTPT CLINIC VISIT: HCPCS | Performed by: PHYSICIAN ASSISTANT

## 2021-02-04 PROCEDURE — 85025 COMPLETE CBC W/AUTO DIFF WBC: CPT | Mod: ZL | Performed by: PHYSICIAN ASSISTANT

## 2021-02-04 RX ORDER — PEN NEEDLE, DIABETIC 29 G X1/2"
NEEDLE, DISPOSABLE MISCELLANEOUS
COMMUNITY
Start: 2020-12-05 | End: 2022-02-18

## 2021-02-04 RX ORDER — DULOXETIN HYDROCHLORIDE 60 MG/1
1 CAPSULE, DELAYED RELEASE ORAL DAILY
COMMUNITY
Start: 2020-12-01 | End: 2022-03-15

## 2021-02-04 RX ORDER — INSULIN DETEMIR 100 [IU]/ML
INJECTION, SOLUTION SUBCUTANEOUS
COMMUNITY
Start: 2021-01-20 | End: 2022-02-18

## 2021-02-04 RX ORDER — OXCARBAZEPINE 150 MG/1
1 TABLET, FILM COATED ORAL DAILY
COMMUNITY
Start: 2020-02-06 | End: 2022-03-15

## 2021-02-04 ASSESSMENT — PAIN SCALES - GENERAL: PAINLEVEL: EXTREME PAIN (8)

## 2021-02-04 NOTE — PROGRESS NOTES
SUBJECTIVE:   HPI  Malik Melendez is a 46 year old male here for 3 weeks of bright red blood per rectum.  He has a history of type 1 diabetes, hyperlipidemia, and generalized anxiety disorder.  Medications include Lipitor, insulin, citalopram, clonidine, and duloxetine.    In the last 3 weeks he has noted blood on the paper daily when he wipes.  He has not noticed any blood streaking the stool or in the water.  He states his mom is approximately 70 years old and was recently diagnosed with colorectal cancer and was treated with chemotherapy. [place] stool average 2-3.  No pain with defecation, 1-2 bowel movements a day, no masses or hemorrhoids that he is noticed.  No recent weight changes.  Isolated brief episode of rectal bleeding in his early 20s or 30s.  No shortness of breath, dizziness, vertigo, tachycardia, or recent illness.  He denies any anal sex.    GAD7  No flowsheet data found.    PHQ9  PHQ 7/6/2016 8/19/2016 11/15/2018   PHQ-9 Total Score 2 0 0   Q9: Thoughts of better off dead/self-harm past 2 weeks Not at all Not at all Not at all     Allergies:  No Known Allergies    Review of Systems   As above otherwise ROS is unremarkable.     OBJECTIVE:     Vitals:    02/04/21 1454   BP: 120/70   BP Location: Right arm   Patient Position: Sitting   Cuff Size: Adult Regular   Pulse: 85   Resp: 20   Temp: 97.7  F (36.5  C)   TempSrc: Temporal   SpO2: 95%   Weight: 87.1 kg (192 lb)     Physical Exam  General Appearance: Pleasant, alert, appropriate appearance for age and circumstances, no acute distress  Gastrointestinal: Abd symmetrical, soft, nontender, no masses, guarding, or tympany, normoactive bowel sounds  Skin: Warm, pink, dry, cap refill less than 2 seconds  Psychiatric Exam: Alert and oriented, appropriate affect    Diagnostic Test Results:  Results for orders placed or performed in visit on 02/04/21   CBC and Differential     Status: None   Result Value Ref Range    WBC 7.8 4.0 - 11.0 10e9/L    RBC Count  4.75 4.4 - 5.9 10e12/L    Hemoglobin 14.3 13.3 - 17.7 g/dL    Hematocrit 40.4 40.0 - 53.0 %    MCV 85 78 - 100 fl    MCH 30.1 26.5 - 33.0 pg    MCHC 35.4 31.5 - 36.5 g/dL    RDW 11.7 10.0 - 15.0 %    Platelet Count 270 150 - 450 10e9/L    Diff Method Automated Method     % Neutrophils 61.8 %    % Lymphocytes 29.7 %    % Monocytes 6.8 %    % Eosinophils 0.9 %    % Basophils 0.4 %    % Immature Granulocytes 0.4 %    Absolute Neutrophil 4.8 1.6 - 8.3 10e9/L    Absolute Lymphocytes 2.3 0.8 - 5.3 10e9/L    Absolute Monocytes 0.5 0.0 - 1.3 10e9/L    Absolute Eosinophils 0.1 0.0 - 0.7 10e9/L    Absolute Basophils 0.0 0.0 - 0.2 10e9/L    Abs Immature Granulocytes 0.0 0 - 0.4 10e9/L   Basic Metabolic Panel     Status: Abnormal   Result Value Ref Range    Sodium 127 (L) 134 - 144 mmol/L    Potassium 4.8 3.5 - 5.1 mmol/L    Chloride 93 (L) 98 - 107 mmol/L    Carbon Dioxide 25 21 - 31 mmol/L    Anion Gap 9 3 - 14 mmol/L    Glucose 595 (HH) 70 - 105 mg/dL    Urea Nitrogen 21 7 - 25 mg/dL    Creatinine 1.12 0.70 - 1.30 mg/dL    GFR Estimate 70 >60 mL/min/[1.73_m2]    GFR Estimate If Black 85 >60 mL/min/[1.73_m2]    Calcium 8.9 8.6 - 10.3 mg/dL     ASSESSMENT/PLAN:       ICD-10-CM    1. Rectal bleeding  K62.5 COLORECTAL SURGERY REFERRAL     Basic Metabolic Panel     CBC and Differential     CBC and Differential     Basic Metabolic Panel       Referral for colonoscopy placed.  Mother recently diagnosed with colorectal cancer at the age of 70.    No anemia on CBC    CMP shows glucose 595.  Sodium 127 chloride 93.  GFR 70.  He has a history of type 1 diabetes.  Last hemoglobin A1c was 2016 and it was 9.8.  Called patient at [phone] but he did not answer.  Left a message and requested he call us back/follow-up as soon as possible.  I also called his contact Celia at [phone] she does not have her voicemail oxygen up.    Orders Placed This Encounter   Procedures     Basic Metabolic Panel     CBC and Differential     COLORECTAL  SURGERY REFERRAL     Return if symptoms worsen or fail to improve.    Total time spent with this patient was 38 minutes which included chart review, visualization and interpretation of images, time spent with the patient, and documentation.    EFRAIN Irizarry  [facility] AND [facility]    This document was prepared using voice generated software.  While every attempt was made for accuracy, grammatical errors may exist.

## 2021-02-04 NOTE — NURSING NOTE
"Chief Complaint   Patient presents with     Rectal Problem     blood in stool      Patient has had blood in stool for 3 weeks. Also the right side of his nose hurts for 5 days.   Initial /70 (BP Location: Right arm, Patient Position: Sitting, Cuff Size: Adult Regular)   Pulse 85   Temp 97.7  F (36.5  C) (Temporal)   Resp 20   Wt 87.1 kg (192 lb)   SpO2 95%   BMI 28.35 kg/m   Estimated body mass index is 28.35 kg/m  as calculated from the following:    Height as of 2/25/20: 1.753 m (5' 9\").    Weight as of this encounter: 87.1 kg (192 lb).  Medication Reconciliation: complete    Amy Kerr LPN  "

## 2021-02-05 NOTE — RESULT ENCOUNTER NOTE
I called and he can did not answer. I left another message and revoiced my concerns about his extremely high glucose level and risk for diabetic ketoacidosis.  I provided some basic education about drinking plenty of water and avoiding dehydration.  If you would drink too much water though he would become a hyponatremic.  I asked that he give us a call back and update us soon as possible.    EFRAIN Nguyen  February 5, 2021  8:39 AM

## 2021-06-19 ENCOUNTER — HOSPITAL ENCOUNTER (EMERGENCY)
Facility: OTHER | Age: 46
Discharge: HOME | End: 2021-06-19
Attending: EMERGENCY MEDICINE | Admitting: EMERGENCY MEDICINE
Payer: MEDICAID

## 2021-06-19 VITALS
RESPIRATION RATE: 18 BRPM | HEART RATE: 75 BPM | WEIGHT: 189.3 LBS | TEMPERATURE: 97.9 F | OXYGEN SATURATION: 97 % | DIASTOLIC BLOOD PRESSURE: 74 MMHG | SYSTOLIC BLOOD PRESSURE: 123 MMHG | HEIGHT: 69 IN | BODY MASS INDEX: 28.04 KG/M2

## 2021-06-19 DIAGNOSIS — E16.2 HYPOGLYCEMIA: ICD-10-CM

## 2021-06-19 LAB
ALBUMIN SERPL-MCNC: 4.3 G/DL (ref 3.5–5.7)
ALBUMIN UR-MCNC: 30 MG/DL
ALP SERPL-CCNC: 60 U/L (ref 34–104)
ALT SERPL W P-5'-P-CCNC: 22 U/L (ref 7–52)
ANION GAP SERPL CALCULATED.3IONS-SCNC: 11 MMOL/L (ref 3–14)
APPEARANCE UR: CLEAR
AST SERPL W P-5'-P-CCNC: 20 U/L (ref 13–39)
BASOPHILS # BLD AUTO: 0 10E9/L (ref 0–0.2)
BASOPHILS NFR BLD AUTO: 0.5 %
BILIRUB SERPL-MCNC: 0.5 MG/DL (ref 0.3–1)
BILIRUB UR QL STRIP: NEGATIVE
BUN SERPL-MCNC: 21 MG/DL (ref 7–25)
CALCIUM SERPL-MCNC: 9.6 MG/DL (ref 8.6–10.3)
CHLORIDE SERPL-SCNC: 100 MMOL/L (ref 98–107)
CO2 SERPL-SCNC: 29 MMOL/L (ref 21–31)
COLOR UR AUTO: ABNORMAL
CREAT SERPL-MCNC: 1.13 MG/DL (ref 0.7–1.3)
DIFFERENTIAL METHOD BLD: NORMAL
EOSINOPHIL # BLD AUTO: 0.1 10E9/L (ref 0–0.7)
EOSINOPHIL NFR BLD AUTO: 1.1 %
ERYTHROCYTE [DISTWIDTH] IN BLOOD BY AUTOMATED COUNT: 11.7 % (ref 10–15)
GFR SERPL CREATININE-BSD FRML MDRD: 70 ML/MIN/{1.73_M2}
GLUCOSE SERPL-MCNC: 62 MG/DL (ref 70–105)
GLUCOSE UR STRIP-MCNC: 70 MG/DL
HCT VFR BLD AUTO: 44.4 % (ref 40–53)
HGB BLD-MCNC: 15.8 G/DL (ref 13.3–17.7)
HGB UR QL STRIP: NEGATIVE
IMM GRANULOCYTES # BLD: 0 10E9/L (ref 0–0.4)
IMM GRANULOCYTES NFR BLD: 0.4 %
KETONES UR STRIP-MCNC: 5 MG/DL
LEUKOCYTE ESTERASE UR QL STRIP: NEGATIVE
LYMPHOCYTES # BLD AUTO: 2 10E9/L (ref 0.8–5.3)
LYMPHOCYTES NFR BLD AUTO: 23.9 %
MCH RBC QN AUTO: 30.5 PG (ref 26.5–33)
MCHC RBC AUTO-ENTMCNC: 35.6 G/DL (ref 31.5–36.5)
MCV RBC AUTO: 86 FL (ref 78–100)
MONOCYTES # BLD AUTO: 0.8 10E9/L (ref 0–1.3)
MONOCYTES NFR BLD AUTO: 9.1 %
NEUTROPHILS # BLD AUTO: 5.5 10E9/L (ref 1.6–8.3)
NEUTROPHILS NFR BLD AUTO: 65 %
NITRATE UR QL: NEGATIVE
PH UR STRIP: 6.5 PH (ref 5–7)
PLATELET # BLD AUTO: 354 10E9/L (ref 150–450)
POTASSIUM SERPL-SCNC: 3.8 MMOL/L (ref 3.5–5.1)
PROT SERPL-MCNC: 6.8 G/DL (ref 6.4–8.9)
RBC # BLD AUTO: 5.18 10E12/L (ref 4.4–5.9)
RBC #/AREA URNS AUTO: 1 /HPF (ref 0–2)
SODIUM SERPL-SCNC: 140 MMOL/L (ref 134–144)
SOURCE: ABNORMAL
SP GR UR STRIP: 1.01 (ref 1–1.03)
UROBILINOGEN UR STRIP-MCNC: NORMAL MG/DL (ref 0–2)
WBC # BLD AUTO: 8.5 10E9/L (ref 4–11)
WBC #/AREA URNS AUTO: 1 /HPF (ref 0–5)

## 2021-06-19 PROCEDURE — 99284 EMERGENCY DEPT VISIT MOD MDM: CPT | Mod: 25 | Performed by: EMERGENCY MEDICINE

## 2021-06-19 PROCEDURE — 250N000011 HC RX IP 250 OP 636: Performed by: EMERGENCY MEDICINE

## 2021-06-19 PROCEDURE — 85025 COMPLETE CBC W/AUTO DIFF WBC: CPT | Performed by: EMERGENCY MEDICINE

## 2021-06-19 PROCEDURE — 258N000003 HC RX IP 258 OP 636: Performed by: EMERGENCY MEDICINE

## 2021-06-19 PROCEDURE — 99283 EMERGENCY DEPT VISIT LOW MDM: CPT | Performed by: EMERGENCY MEDICINE

## 2021-06-19 PROCEDURE — 96374 THER/PROPH/DIAG INJ IV PUSH: CPT | Performed by: EMERGENCY MEDICINE

## 2021-06-19 PROCEDURE — 36415 COLL VENOUS BLD VENIPUNCTURE: CPT | Performed by: EMERGENCY MEDICINE

## 2021-06-19 PROCEDURE — 80053 COMPREHEN METABOLIC PANEL: CPT | Performed by: EMERGENCY MEDICINE

## 2021-06-19 PROCEDURE — 96361 HYDRATE IV INFUSION ADD-ON: CPT | Performed by: EMERGENCY MEDICINE

## 2021-06-19 PROCEDURE — 81001 URINALYSIS AUTO W/SCOPE: CPT | Performed by: EMERGENCY MEDICINE

## 2021-06-19 RX ORDER — ONDANSETRON 2 MG/ML
4 INJECTION INTRAMUSCULAR; INTRAVENOUS ONCE
Status: COMPLETED | OUTPATIENT
Start: 2021-06-19 | End: 2021-06-19

## 2021-06-19 RX ADMIN — DEXTROSE AND SODIUM CHLORIDE: 5; 900 INJECTION, SOLUTION INTRAVENOUS at 08:06

## 2021-06-19 RX ADMIN — ONDANSETRON 4 MG: 2 INJECTION INTRAMUSCULAR; INTRAVENOUS at 08:07

## 2021-06-19 ASSESSMENT — ENCOUNTER SYMPTOMS
CHILLS: 0
FEVER: 0
NAUSEA: 1
ABDOMINAL PAIN: 0
ARTHRALGIAS: 0
LIGHT-HEADEDNESS: 0
AGITATION: 0
CHEST TIGHTNESS: 0
VOMITING: 1
SHORTNESS OF BREATH: 0
DYSURIA: 0

## 2021-06-19 ASSESSMENT — MIFFLIN-ST. JEOR: SCORE: 1724.04

## 2021-06-19 NOTE — ED PROVIDER NOTES
History     Chief Complaint   Patient presents with     Headache     HPI  Malik Melendez is a 46 year old male who is here stating that he cannot keep anything down.  He is a type I diabetic, last night his sugar was over 500.  He gave himself a large bolus of insulin.  He states however since that time he has been unable to keep anything down.  He tried some soup last night and that came back up.  This morning he tried a grilled cheese sandwich and that also came back up.  He is now has a headache as well.  Has not been ill recently with anything else, does not feel like he is coming down with any type of acute illness.  Yesterday was a normal day.  Denies drug or alcohol use.    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  Per history       Drug-induced mood disorder (H) 01/18/2008     Priority: Medium     Personality disorder (H) 01/18/2008     Priority: Medium     Formatting of this note might be different from the original.  Per history  Epic       Major depressive disorder, single episode 01/18/2008     Priority: Medium     Formatting of this note might be different from the original.  Per history  Epic          Past Medical History:    No past medical history on file.    Past Surgical History:    No past surgical history on  "file.    Family History:    No family history on file.    Social History:  Marital Status:  Single [1]  Social History     Tobacco Use     Smoking status: Former Smoker     Types: Cigarettes     Smokeless tobacco: Current User   Substance Use Topics     Alcohol use: No     Drug use: No        Medications:    atorvastatin (LIPITOR) 10 MG tablet  BD INSULIN SYRINGE U/F 31G X 5/16\" 1 ML miscellaneous  blood glucose monitoring (AMANDA CONTOUR) test strip  CITALOPRAM HYDROBROMIDE 40 MG OR TABS  cloNIDine (CATAPRES) 0.1 MG tablet  DULoxetine (CYMBALTA) 30 MG EC capsule  DULoxetine (CYMBALTA) 60 MG capsule  HUMALOG SC  insulin aspart (NOVOLOG PEN) 100 UNIT/ML injection  insulin glargine (LANTUS) 100 UNIT/ML injection  insulin pen needle (B-D U/F) 31G X 8 MM  LEVEMIR VIAL 100 UNIT/ML soln  lisinopril (PRINIVIL/ZESTRIL) 20 MG tablet  OXcarbazepine (TRILEPTAL) 150 MG tablet  sulindac (CLINORIL) 150 MG tablet  syringe, disposable, 1 ML MISC  thin (NO BRAND SPECIFIED) lancets          Review of Systems   Constitutional: Negative for chills and fever.   HENT: Negative for congestion.    Eyes: Negative for visual disturbance.   Respiratory: Negative for chest tightness and shortness of breath.    Cardiovascular: Negative for chest pain.   Gastrointestinal: Positive for nausea and vomiting. Negative for abdominal pain.   Genitourinary: Negative for dysuria.   Musculoskeletal: Negative for arthralgias.   Skin: Negative for rash.   Neurological: Negative for light-headedness.   Psychiatric/Behavioral: Negative for agitation.       Physical Exam   BP: (!) 152/94  Pulse: 83  Temp: 97.9  F (36.6  C)  Resp: 18  Height: 175.3 cm (5' 9\")  Weight: 85.9 kg (189 lb 4.8 oz)  SpO2: 99 %      Physical Exam  Vitals signs and nursing note reviewed.   Constitutional:       Appearance: Normal appearance.   HENT:      Head: Normocephalic and atraumatic.      Mouth/Throat:      Mouth: Mucous membranes are moist.   Eyes:      Conjunctiva/sclera: " Conjunctivae normal.   Cardiovascular:      Rate and Rhythm: Normal rate and regular rhythm.      Heart sounds: Normal heart sounds.   Pulmonary:      Effort: Pulmonary effort is normal.      Breath sounds: Normal breath sounds.   Abdominal:      General: Abdomen is flat. Bowel sounds are normal. There is no distension.   Skin:     General: Skin is warm and dry.   Neurological:      Mental Status: He is alert and oriented to person, place, and time.   Psychiatric:         Behavior: Behavior normal.         ED Course        Procedures                 Results for orders placed or performed during the hospital encounter of 06/19/21 (from the past 24 hour(s))   CBC with platelets differential   Result Value Ref Range    WBC 8.5 4.0 - 11.0 10e9/L    RBC Count 5.18 4.4 - 5.9 10e12/L    Hemoglobin 15.8 13.3 - 17.7 g/dL    Hematocrit 44.4 40.0 - 53.0 %    MCV 86 78 - 100 fl    MCH 30.5 26.5 - 33.0 pg    MCHC 35.6 31.5 - 36.5 g/dL    RDW 11.7 10.0 - 15.0 %    Platelet Count 354 150 - 450 10e9/L    Diff Method Automated Method     % Neutrophils 65.0 %    % Lymphocytes 23.9 %    % Monocytes 9.1 %    % Eosinophils 1.1 %    % Basophils 0.5 %    % Immature Granulocytes 0.4 %    Absolute Neutrophil 5.5 1.6 - 8.3 10e9/L    Absolute Lymphocytes 2.0 0.8 - 5.3 10e9/L    Absolute Monocytes 0.8 0.0 - 1.3 10e9/L    Absolute Eosinophils 0.1 0.0 - 0.7 10e9/L    Absolute Basophils 0.0 0.0 - 0.2 10e9/L    Abs Immature Granulocytes 0.0 0 - 0.4 10e9/L   Comprehensive metabolic panel   Result Value Ref Range    Sodium 140 134 - 144 mmol/L    Potassium 3.8 3.5 - 5.1 mmol/L    Chloride 100 98 - 107 mmol/L    Carbon Dioxide 29 21 - 31 mmol/L    Anion Gap 11 3 - 14 mmol/L    Glucose 62 (L) 70 - 105 mg/dL    Urea Nitrogen 21 7 - 25 mg/dL    Creatinine 1.13 0.70 - 1.30 mg/dL    GFR Estimate 70 >60 mL/min/[1.73_m2]    GFR Estimate If Black 84 >60 mL/min/[1.73_m2]    Calcium 9.6 8.6 - 10.3 mg/dL    Bilirubin Total 0.5 0.3 - 1.0 mg/dL    Albumin 4.3 3.5  - 5.7 g/dL    Protein Total 6.8 6.4 - 8.9 g/dL    Alkaline Phosphatase 60 34 - 104 U/L    ALT 22 7 - 52 U/L    AST 20 13 - 39 U/L   UA reflex to Microscopic and Culture    Specimen: Urine clean catch; Midstream Urine   Result Value Ref Range    Color Urine Light Yellow     Appearance Urine Clear     Glucose Urine 70 (A) NEG^Negative mg/dL    Bilirubin Urine Negative NEG^Negative    Ketones Urine 5 (A) NEG^Negative mg/dL    Specific Gravity Urine 1.012 1.003 - 1.035    Blood Urine Negative NEG^Negative    pH Urine 6.5 5.0 - 7.0 pH    Protein Albumin Urine 30 (A) NEG^Negative mg/dL    Urobilinogen mg/dL Normal 0.0 - 2.0 mg/dL    Nitrite Urine Negative NEG^Negative    Leukocyte Esterase Urine Negative NEG^Negative    Source Midstream Urine     RBC Urine 1 0 - 2 /HPF    WBC Urine 1 0 - 5 /HPF       Medications   dextrose 5% and 0.9% NaCl infusion (0 mLs Intravenous Stopped 6/19/21 0943)   ondansetron (ZOFRAN) injection 4 mg (4 mg Intravenous Given 6/19/21 0807)       Assessments & Plan (with Medical Decision Making)     I have reviewed the nursing notes.    I have reviewed the findings, diagnosis, plan and need for follow up with the patient.  Labs all very reassuring with the exception of his low glucose.  Apparently he did take his morning insulin.  He initially was getting some D5 normal saline due to his low blood sugar.  We were able to feed him and he ate an omelette and has kept that down.  We have stopped the IV fluid and monitored him for a while.  We just rechecked his glucose and it is in the 120 range.  He is feeling better with no nausea.  He will be discharged home at this time.  Return if worse    New Prescriptions    No medications on file       Final diagnoses:   Hypoglycemia       6/19/2021   [facility] AND [facility]     Josué Sargent MD  06/19/21 5814

## 2021-06-19 NOTE — ED TRIAGE NOTES
"ED Nursing Triage Note (General)   ________________________________    Malik Melendez is a 46 year old Male that presents to triage private car with history of headache, nausea/vomiting and uncontrolled BG reported by patient. He is a type 1 DM, had a BG reading of 525 at 11pm last night, administered 50u Novolog. Repeat BG @ 0130 last night was 311, this morning 69. POC in triage of 62. Patient adits to poorly controlled BG frequently ranging from 300-500.   BP (!) 152/94   Pulse 83   Temp 97.9  F (36.6  C) (Tympanic)   Resp 18   Ht 1.753 m (5' 9\")   Wt 85.9 kg (189 lb 4.8 oz)   SpO2 99%   BMI 27.95 kg/m  t  Patient appears alert , in mild distress., and cooperative and pleasant behavior.    GCS Eye Opening = 4=Spontaneous  Airway: intact  Breathing noted as Normal  Circulation Normal  Skin:  Normal  Action taken:  Taken to [place] [facility]          "

## 2022-01-01 ENCOUNTER — APPOINTMENT (OUTPATIENT)
Dept: CT IMAGING | Facility: OTHER | Age: 47
End: 2022-01-01
Attending: EMERGENCY MEDICINE
Payer: MEDICAID

## 2022-01-01 ENCOUNTER — HOSPITAL ENCOUNTER (EMERGENCY)
Facility: OTHER | Age: 47
Discharge: HOME | End: 2022-12-24
Attending: EMERGENCY MEDICINE | Admitting: EMERGENCY MEDICINE
Payer: MEDICAID

## 2022-01-01 VITALS
HEART RATE: 75 BPM | DIASTOLIC BLOOD PRESSURE: 92 MMHG | OXYGEN SATURATION: 97 % | SYSTOLIC BLOOD PRESSURE: 154 MMHG | HEIGHT: 69 IN | RESPIRATION RATE: 15 BRPM | WEIGHT: 180 LBS | BODY MASS INDEX: 26.66 KG/M2 | TEMPERATURE: 97 F

## 2022-01-01 DIAGNOSIS — E10.65 UNCONTROLLED TYPE 1 DIABETES MELLITUS WITH HYPERGLYCEMIA (H): ICD-10-CM

## 2022-01-01 DIAGNOSIS — S09.90XA CLOSED HEAD INJURY, INITIAL ENCOUNTER: ICD-10-CM

## 2022-01-01 LAB
ANION GAP SERPL CALCULATED.3IONS-SCNC: 12 MMOL/L (ref 7–15)
ATRIAL RATE - MUSE: 73 BPM
BASOPHILS # BLD AUTO: 0 10E3/UL (ref 0–0.2)
BASOPHILS NFR BLD AUTO: 0 %
BUN SERPL-MCNC: 21.2 MG/DL (ref 6–20)
CALCIUM SERPL-MCNC: 9.2 MG/DL (ref 8.6–10)
CHLORIDE SERPL-SCNC: 96 MMOL/L (ref 98–107)
CREAT SERPL-MCNC: 0.94 MG/DL (ref 0.67–1.17)
DEPRECATED HCO3 PLAS-SCNC: 25 MMOL/L (ref 22–29)
DIASTOLIC BLOOD PRESSURE - MUSE: NORMAL MMHG
EOSINOPHIL # BLD AUTO: 0 10E3/UL (ref 0–0.7)
EOSINOPHIL NFR BLD AUTO: 0 %
ERYTHROCYTE [DISTWIDTH] IN BLOOD BY AUTOMATED COUNT: 11.5 % (ref 10–15)
GFR SERPL CREATININE-BSD FRML MDRD: >90 ML/MIN/1.73M2
GLUCOSE BLDC GLUCOMTR-MCNC: 150 MG/DL (ref 70–99)
GLUCOSE SERPL-MCNC: 107 MG/DL (ref 70–99)
HCT VFR BLD AUTO: 42.4 % (ref 40–53)
HGB BLD-MCNC: 15.3 G/DL (ref 13.3–17.7)
HOLD SPECIMEN: NORMAL
IMM GRANULOCYTES # BLD: 0 10E3/UL
IMM GRANULOCYTES NFR BLD: 0 %
INTERPRETATION ECG - MUSE: NORMAL
LYMPHOCYTES # BLD AUTO: 2 10E3/UL (ref 0.8–5.3)
LYMPHOCYTES NFR BLD AUTO: 21 %
MCH RBC QN AUTO: 30.2 PG (ref 26.5–33)
MCHC RBC AUTO-ENTMCNC: 36.1 G/DL (ref 31.5–36.5)
MCV RBC AUTO: 84 FL (ref 78–100)
MONOCYTES # BLD AUTO: 0.6 10E3/UL (ref 0–1.3)
MONOCYTES NFR BLD AUTO: 6 %
NEUTROPHILS # BLD AUTO: 7.2 10E3/UL (ref 1.6–8.3)
NEUTROPHILS NFR BLD AUTO: 73 %
NRBC # BLD AUTO: 0 10E3/UL
NRBC BLD AUTO-RTO: 0 /100
P AXIS - MUSE: 41 DEGREES
PLATELET # BLD AUTO: 271 10E3/UL (ref 150–450)
POTASSIUM SERPL-SCNC: 3.9 MMOL/L (ref 3.4–5.3)
PR INTERVAL - MUSE: 156 MS
QRS DURATION - MUSE: 92 MS
QT - MUSE: 402 MS
QTC - MUSE: 442 MS
R AXIS - MUSE: 37 DEGREES
RBC # BLD AUTO: 5.06 10E6/UL (ref 4.4–5.9)
SODIUM SERPL-SCNC: 133 MMOL/L (ref 136–145)
SYSTOLIC BLOOD PRESSURE - MUSE: NORMAL MMHG
T AXIS - MUSE: 26 DEGREES
VENTRICULAR RATE- MUSE: 73 BPM
WBC # BLD AUTO: 9.9 10E3/UL (ref 4–11)

## 2022-01-01 PROCEDURE — 85025 COMPLETE CBC W/AUTO DIFF WBC: CPT | Performed by: EMERGENCY MEDICINE

## 2022-01-01 PROCEDURE — 72125 CT NECK SPINE W/O DYE: CPT | Mod: TC

## 2022-01-01 PROCEDURE — 99285 EMERGENCY DEPT VISIT HI MDM: CPT | Mod: 25 | Performed by: EMERGENCY MEDICINE

## 2022-01-01 PROCEDURE — 70450 CT HEAD/BRAIN W/O DYE: CPT | Mod: TC

## 2022-01-01 PROCEDURE — 80048 BASIC METABOLIC PNL TOTAL CA: CPT | Performed by: EMERGENCY MEDICINE

## 2022-01-01 PROCEDURE — 93010 ELECTROCARDIOGRAM REPORT: CPT | Performed by: INTERNAL MEDICINE

## 2022-01-01 PROCEDURE — 93005 ELECTROCARDIOGRAM TRACING: CPT

## 2022-01-01 PROCEDURE — 99283 EMERGENCY DEPT VISIT LOW MDM: CPT | Performed by: EMERGENCY MEDICINE

## 2022-01-01 RX ORDER — SYRING-NEEDL,DISP,INSUL,0.3 ML 31GX15/64"
SYRINGE, EMPTY DISPOSABLE MISCELLANEOUS
Qty: 300 EACH | Refills: 1 | Status: SHIPPED | OUTPATIENT
Start: 2022-01-01 | End: 2023-01-01

## 2022-01-01 ASSESSMENT — COLUMBIA-SUICIDE SEVERITY RATING SCALE - C-SSRS
LETHALITY/MEDICAL DAMAGE CODE MOST LETHAL POTENTIAL ATTEMPT: BEHAVIOR NOT LIKELY TO RESULT IN INJURY
TOTAL  NUMBER OF ABORTED OR SELF INTERRUPTED ATTEMPTS LIFETIME: NO
1. IN YOUR LIFETIME, HAVE YOU WISHED YOU WERE DEAD OR WISHED YOU COULD GO TO SLEEP AND NOT WAKE UP?: YES
FIRST ATTEMPT DATE: 54422
LETHALITY/MEDICAL DAMAGE CODE MOST RECENT ACTUAL ATTEMPT: NO PHYSICAL DAMAGE OR VERY MINOR PHYSICAL DAMAGE
LETHALITY/MEDICAL DAMAGE CODE FIRST POTENTIAL ATTEMPT: BEHAVIOR NOT LIKELY TO RESULT IN INJURY
6. HAVE YOU EVER DONE ANYTHING, STARTED TO DO ANYTHING, OR PREPARED TO DO ANYTHING TO END YOUR LIFE?: NO
REASONS FOR IDEATION LIFETIME: MOSTLY TO END OR STOP THE PAIN (YOU COULDN'T GO ON LIVING WITH THE PAIN OR HOW YOU WERE FEELING)
REASONS FOR IDEATION PAST MONTH: COMPLETELY TO GET ATTENTION, REVENGE, OR A REACTION FROM OTHERS
LETHALITY/MEDICAL DAMAGE CODE MOST LETHAL ACTUAL ATTEMPT: NO PHYSICAL DAMAGE OR VERY MINOR PHYSICAL DAMAGE
LETHALITY/MEDICAL DAMAGE CODE MOST RECENT POTENTIAL ATTEMPT: BEHAVIOR NOT LIKELY TO RESULT IN INJURY
2. HAVE YOU ACTUALLY HAD ANY THOUGHTS OF KILLING YOURSELF?: NO
TOTAL  NUMBER OF INTERRUPTED ATTEMPTS LIFETIME: NO
MOST LETHAL DATE: 54422
TOTAL  NUMBER OF ACTUAL ATTEMPTS LIFETIME: 1
1. HAVE YOU WISHED YOU WERE DEAD OR WISHED YOU COULD GO TO SLEEP AND NOT WAKE UP?: YES
LETHALITY/MEDICAL DAMAGE CODE FIRST ACTUAL ATTEMPT: NO PHYSICAL DAMAGE OR VERY MINOR PHYSICAL DAMAGE
ATTEMPT LIFETIME: YES
1. IN THE PAST MONTH, HAVE YOU WISHED YOU WERE DEAD OR WISHED YOU COULD GO TO SLEEP AND NOT WAKE UP?: YES
ATTEMPT PAST THREE MONTHS: NO
1. IN THE PAST MONTH, HAVE YOU WISHED YOU WERE DEAD OR WISHED YOU COULD GO TO SLEEP AND NOT WAKE UP?: NO
MOST RECENT DATE: 54422

## 2022-01-01 ASSESSMENT — ENCOUNTER SYMPTOMS
HEMATOLOGIC/LYMPHATIC NEGATIVE: 1
NERVOUS/ANXIOUS: 0
CHILLS: 0
ALLERGIC/IMMUNOLOGIC NEGATIVE: 1
MYALGIAS: 0
CARDIOVASCULAR NEGATIVE: 1
GASTROINTESTINAL NEGATIVE: 1
NECK PAIN: 0
SLEEP DISTURBANCE: 0
CONSTITUTIONAL NEGATIVE: 1
EYES NEGATIVE: 1
MUSCULOSKELETAL NEGATIVE: 1
NEUROLOGICAL NEGATIVE: 1
RESPIRATORY NEGATIVE: 1
NECK STIFFNESS: 0
PSYCHIATRIC NEGATIVE: 1
SHORTNESS OF BREATH: 0

## 2022-01-01 ASSESSMENT — ACTIVITIES OF DAILY LIVING (ADL)
ADLS_ACUITY_SCORE: 35

## 2022-02-18 ENCOUNTER — OFFICE VISIT (OUTPATIENT)
Dept: FAMILY MEDICINE | Facility: OTHER | Age: 47
End: 2022-02-18
Attending: FAMILY MEDICINE
Payer: MEDICAID

## 2022-02-18 ENCOUNTER — TELEPHONE (OUTPATIENT)
Dept: FAMILY MEDICINE | Facility: OTHER | Age: 47
End: 2022-02-18

## 2022-02-18 VITALS
OXYGEN SATURATION: 99 % | RESPIRATION RATE: 14 BRPM | TEMPERATURE: 96.2 F | WEIGHT: 202 LBS | DIASTOLIC BLOOD PRESSURE: 94 MMHG | BODY MASS INDEX: 29.92 KG/M2 | HEIGHT: 69 IN | HEART RATE: 83 BPM | SYSTOLIC BLOOD PRESSURE: 146 MMHG

## 2022-02-18 DIAGNOSIS — I10 BENIGN ESSENTIAL HYPERTENSION: ICD-10-CM

## 2022-02-18 DIAGNOSIS — Z12.11 COLON CANCER SCREENING: ICD-10-CM

## 2022-02-18 DIAGNOSIS — Z11.4 ENCOUNTER FOR SCREENING FOR HIV: ICD-10-CM

## 2022-02-18 DIAGNOSIS — F41.9 ANXIETY: ICD-10-CM

## 2022-02-18 DIAGNOSIS — K62.5 RECTAL BLEEDING: ICD-10-CM

## 2022-02-18 DIAGNOSIS — E10.65 UNCONTROLLED TYPE 1 DIABETES MELLITUS WITH HYPERGLYCEMIA (H): ICD-10-CM

## 2022-02-18 DIAGNOSIS — R45.4 ANGER REACTION: ICD-10-CM

## 2022-02-18 DIAGNOSIS — F32.A DEPRESSION, UNSPECIFIED DEPRESSION TYPE: ICD-10-CM

## 2022-02-18 DIAGNOSIS — Z00.00 ANNUAL PHYSICAL EXAM: Primary | ICD-10-CM

## 2022-02-18 DIAGNOSIS — Z11.59 ENCOUNTER FOR HEPATITIS C SCREENING TEST FOR LOW RISK PATIENT: ICD-10-CM

## 2022-02-18 DIAGNOSIS — M79.89 SOFT TISSUE MASS: ICD-10-CM

## 2022-02-18 LAB
ALBUMIN SERPL-MCNC: 4.3 G/DL (ref 3.5–5.7)
ALP SERPL-CCNC: 52 U/L (ref 34–104)
ALT SERPL W P-5'-P-CCNC: 11 U/L (ref 7–52)
ANION GAP SERPL CALCULATED.3IONS-SCNC: 5 MMOL/L (ref 3–14)
AST SERPL W P-5'-P-CCNC: 19 U/L (ref 13–39)
BASOPHILS # BLD AUTO: 0 10E3/UL (ref 0–0.2)
BASOPHILS NFR BLD AUTO: 0 %
BILIRUB SERPL-MCNC: 0.4 MG/DL (ref 0.3–1)
BUN SERPL-MCNC: 13 MG/DL (ref 7–25)
CALCIUM SERPL-MCNC: 9.3 MG/DL (ref 8.6–10.3)
CHLORIDE BLD-SCNC: 99 MMOL/L (ref 98–107)
CHOLEST SERPL-MCNC: 230 MG/DL
CO2 SERPL-SCNC: 28 MMOL/L (ref 21–31)
CREAT SERPL-MCNC: 1.15 MG/DL (ref 0.7–1.3)
CREAT UR-MCNC: 44 MG/DL
EOSINOPHIL # BLD AUTO: 0.1 10E3/UL (ref 0–0.7)
EOSINOPHIL NFR BLD AUTO: 1 %
ERYTHROCYTE [DISTWIDTH] IN BLOOD BY AUTOMATED COUNT: 11.7 % (ref 10–15)
FASTING STATUS PATIENT QL REPORTED: ABNORMAL
GFR SERPL CREATININE-BSD FRML MDRD: 79 ML/MIN/1.73M2
GLUCOSE BLD-MCNC: 417 MG/DL (ref 70–105)
HBA1C MFR BLD: 10 % (ref 4–6.2)
HCT VFR BLD AUTO: 44.3 % (ref 40–53)
HDLC SERPL-MCNC: 44 MG/DL (ref 23–92)
HGB BLD-MCNC: 15.3 G/DL (ref 13.3–17.7)
IMM GRANULOCYTES # BLD: 0 10E3/UL
IMM GRANULOCYTES NFR BLD: 0 %
LDLC SERPL CALC-MCNC: 134 MG/DL
LYMPHOCYTES # BLD AUTO: 1.7 10E3/UL (ref 0.8–5.3)
LYMPHOCYTES NFR BLD AUTO: 19 %
MCH RBC QN AUTO: 29.8 PG (ref 26.5–33)
MCHC RBC AUTO-ENTMCNC: 34.5 G/DL (ref 31.5–36.5)
MCV RBC AUTO: 86 FL (ref 78–100)
MICROALBUMIN UR-MCNC: 212 MG/L
MICROALBUMIN/CREAT UR: 481.82 MG/G CR (ref 0–17)
MONOCYTES # BLD AUTO: 0.5 10E3/UL (ref 0–1.3)
MONOCYTES NFR BLD AUTO: 6 %
NEUTROPHILS # BLD AUTO: 6.4 10E3/UL (ref 1.6–8.3)
NEUTROPHILS NFR BLD AUTO: 74 %
NONHDLC SERPL-MCNC: 186 MG/DL
NRBC # BLD AUTO: 0 10E3/UL
NRBC BLD AUTO-RTO: 0 /100
PLATELET # BLD AUTO: 261 10E3/UL (ref 150–450)
POTASSIUM BLD-SCNC: 4.9 MMOL/L (ref 3.5–5.1)
PROT SERPL-MCNC: 6.1 G/DL (ref 6.4–8.9)
RBC # BLD AUTO: 5.13 10E6/UL (ref 4.4–5.9)
SODIUM SERPL-SCNC: 132 MMOL/L (ref 134–144)
TESTOST SERPL-MCNC: 253 NG/DL (ref 175–781)
TRIGL SERPL-MCNC: 261 MG/DL
TSH SERPL DL<=0.005 MIU/L-ACNC: 1.9 MU/L (ref 0.4–4)
WBC # BLD AUTO: 8.7 10E3/UL (ref 4–11)

## 2022-02-18 PROCEDURE — 85025 COMPLETE CBC W/AUTO DIFF WBC: CPT | Mod: ZL | Performed by: FAMILY MEDICINE

## 2022-02-18 PROCEDURE — 84403 ASSAY OF TOTAL TESTOSTERONE: CPT | Mod: ZL | Performed by: FAMILY MEDICINE

## 2022-02-18 PROCEDURE — 80061 LIPID PANEL: CPT | Mod: ZL | Performed by: FAMILY MEDICINE

## 2022-02-18 PROCEDURE — 86803 HEPATITIS C AB TEST: CPT | Mod: ZL | Performed by: FAMILY MEDICINE

## 2022-02-18 PROCEDURE — 82043 UR ALBUMIN QUANTITATIVE: CPT | Mod: ZL | Performed by: FAMILY MEDICINE

## 2022-02-18 PROCEDURE — G0463 HOSPITAL OUTPT CLINIC VISIT: HCPCS

## 2022-02-18 PROCEDURE — 87389 HIV-1 AG W/HIV-1&-2 AB AG IA: CPT | Mod: ZL | Performed by: FAMILY MEDICINE

## 2022-02-18 PROCEDURE — 36415 COLL VENOUS BLD VENIPUNCTURE: CPT | Mod: ZL | Performed by: FAMILY MEDICINE

## 2022-02-18 PROCEDURE — 99396 PREV VISIT EST AGE 40-64: CPT | Performed by: FAMILY MEDICINE

## 2022-02-18 PROCEDURE — 99215 OFFICE O/P EST HI 40 MIN: CPT | Mod: 25 | Performed by: FAMILY MEDICINE

## 2022-02-18 PROCEDURE — 80053 COMPREHEN METABOLIC PANEL: CPT | Mod: ZL | Performed by: FAMILY MEDICINE

## 2022-02-18 PROCEDURE — 83036 HEMOGLOBIN GLYCOSYLATED A1C: CPT | Mod: ZL | Performed by: FAMILY MEDICINE

## 2022-02-18 PROCEDURE — 84443 ASSAY THYROID STIM HORMONE: CPT | Mod: ZL | Performed by: FAMILY MEDICINE

## 2022-02-18 RX ORDER — LISINOPRIL 20 MG/1
20 TABLET ORAL DAILY
Qty: 90 TABLET | Refills: 3 | Status: SHIPPED | OUTPATIENT
Start: 2022-02-18 | End: 2023-01-01

## 2022-02-18 RX ORDER — INSULIN DETEMIR 100 [IU]/ML
24 INJECTION, SOLUTION SUBCUTANEOUS 2 TIMES DAILY
Qty: 40 ML | Refills: 3 | Status: SHIPPED | OUTPATIENT
Start: 2022-02-18 | End: 2023-01-01

## 2022-02-18 RX ORDER — ATORVASTATIN CALCIUM 10 MG/1
10 TABLET, FILM COATED ORAL DAILY
Qty: 90 TABLET | Refills: 3 | Status: SHIPPED | OUTPATIENT
Start: 2022-02-18 | End: 2023-01-01

## 2022-02-18 RX ORDER — LANCING DEVICE
EACH MISCELLANEOUS
Qty: 1 EACH | Refills: 0 | Status: SHIPPED | OUTPATIENT
Start: 2022-02-18

## 2022-02-18 RX ORDER — PEN NEEDLE, DIABETIC 29 G X1/2"
NEEDLE, DISPOSABLE MISCELLANEOUS
Qty: 300 EACH | Refills: 3 | Status: SHIPPED | OUTPATIENT
Start: 2022-02-18 | End: 2022-01-01

## 2022-02-18 RX ORDER — LANCETS
EACH MISCELLANEOUS
Qty: 100 EACH | Refills: 3 | Status: SHIPPED | OUTPATIENT
Start: 2022-02-18

## 2022-02-18 ASSESSMENT — PAIN SCALES - GENERAL: PAINLEVEL: NO PAIN (0)

## 2022-02-18 ASSESSMENT — PATIENT HEALTH QUESTIONNAIRE - PHQ9
10. IF YOU CHECKED OFF ANY PROBLEMS, HOW DIFFICULT HAVE THESE PROBLEMS MADE IT FOR YOU TO DO YOUR WORK, TAKE CARE OF THINGS AT HOME, OR GET ALONG WITH OTHER PEOPLE: VERY DIFFICULT
SUM OF ALL RESPONSES TO PHQ QUESTIONS 1-9: 8
SUM OF ALL RESPONSES TO PHQ QUESTIONS 1-9: 8

## 2022-02-18 NOTE — NURSING NOTE
"Patient presents to clinic for annual physical. He does have concerns about having bouts of \"rage\".  Katina Dennison LPN ....................  2/18/2022   9:51 AM      "

## 2022-02-18 NOTE — TELEPHONE ENCOUNTER
Re syringes and insulin, please call Pharmacy back, thank you!     Carolina Zhao on 2/18/2022 at 12:44 PM
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
## 2022-02-18 NOTE — TELEPHONE ENCOUNTER
[facility] called and stated that patient needed test strips and lancing device.  Katina Dennison LPN ....................  2/18/2022   1:08 PM

## 2022-02-19 ASSESSMENT — PATIENT HEALTH QUESTIONNAIRE - PHQ9: SUM OF ALL RESPONSES TO PHQ QUESTIONS 1-9: 8

## 2022-02-20 LAB
HCV AB SERPL QL IA: NONREACTIVE
HIV 1+2 AB+HIV1 P24 AG SERPL QL IA: NONREACTIVE

## 2022-02-22 DIAGNOSIS — E10.9 DIABETES MELLITUS TYPE I (H): Primary | ICD-10-CM

## 2022-02-22 NOTE — TELEPHONE ENCOUNTER
Request in regards to the following:     Novolog Flexpen 100 unit/ml inj  Sliding scale as directed, max of 30 units daily    Note to prescriber:  Patient would like vials. Please resend Rx. Thank you.    Laura Gaona RN .............. 2/22/2022  10:44 AM

## 2022-02-23 ENCOUNTER — ALLIED HEALTH/NURSE VISIT (OUTPATIENT)
Dept: EDUCATION SERVICES | Facility: OTHER | Age: 47
End: 2022-02-23
Attending: FAMILY MEDICINE
Payer: MEDICAID

## 2022-02-23 ENCOUNTER — TELEPHONE (OUTPATIENT)
Dept: EDUCATION SERVICES | Facility: OTHER | Age: 47
End: 2022-02-23

## 2022-02-23 DIAGNOSIS — E10.65 UNCONTROLLED TYPE 1 DIABETES MELLITUS WITH HYPERGLYCEMIA (H): Primary | ICD-10-CM

## 2022-02-23 DIAGNOSIS — E10.65 UNCONTROLLED TYPE 1 DIABETES MELLITUS WITH HYPERGLYCEMIA (H): ICD-10-CM

## 2022-02-23 PROCEDURE — G0108 DIAB MANAGE TRN  PER INDIV: HCPCS | Performed by: REGISTERED NURSE

## 2022-02-23 NOTE — PROGRESS NOTES
"Diabetes Self-Management Education & Support    Presents for: Individual review    SUBJECTIVE/OBJECTIVE:  Presents for: Individual review  Accompanied by: Self  Diabetes education in the past 24mo: No  Focus of Visit: Problem Solving  Diabetes type: Type 1  Disease course: Worsening  Cultural Influences/Ethnic Background:    Patient wondering about a better plan to take insulin for his carb and correction.  He states sometimes BG are very high over 500 and then can drop low in the 50s.    Diabetes Symptoms & Complications:  Fatigue: Sometimes  Neuropathy: Sometimes  Polydipsia: Sometimes  Polyphagia: Sometimes  Polyuria: Sometimes  Visual change: Sometimes  Slow healing wounds: Sometimes  Autonomic neuropathy: No  CVA: No  Heart disease: No  Nephropathy: No  Peripheral neuropathy: Yes  Peripheral Vascular Disease: No  Retinopathy: Yes  Sexual dysfunction: Yes    Patient Problem List and Family Medical History reviewed for relevant medical history, current medical status, and diabetes risk factors.    Vitals:  There were no vitals taken for this visit.  Estimated body mass index is 29.83 kg/m  as calculated from the following:    Height as of 2/18/22: 1.753 m (5' 9\").    Weight as of 2/18/22: 91.6 kg (202 lb).   Last 3 BP:   BP Readings from Last 3 Encounters:   02/18/22 (!) 146/94   06/19/21 123/74   02/04/21 120/70       History   Smoking Status     Former Smoker     Types: Cigarettes   Smokeless Tobacco     Current User       Labs:  Lab Results   Component Value Date    A1C 10.0 02/18/2022     Lab Results   Component Value Date     02/18/2022    GLC 62 06/19/2021     Lab Results   Component Value Date     02/18/2022     08/19/2016     HDL Cholesterol   Date Value Ref Range Status   08/19/2016 58 23 - 92 mg/dL      Direct Measure HDL   Date Value Ref Range Status   02/18/2022 44 23 - 92 mg/dL Final   ]  GFR Estimate   Date Value Ref Range Status   02/18/2022 79 >60 mL/min/1.73m2 Final "     Comment:     Effective December 21, 2021 eGFRcr in adults is calculated using the 2021 CKD-EPI creatinine equation which includes age and gender (Baltazar ernst al., NEJ, DOI: 10.1056/CDFLcn6741962)   06/19/2021 70 >60 mL/min/[1.73_m2] Final     GFR Estimate If Black   Date Value Ref Range Status   06/19/2021 84 >60 mL/min/[1.73_m2] Final     Lab Results   Component Value Date    CR 1.15 02/18/2022    CR 1.13 06/19/2021     No results found for: MICROALBUMIN    Healthy Eating:  Healthy Eating Assessed Today: Yes  Cultural/[employer] diet restrictions?: Yes  Meal planning/habits: Carb counting, High carb, Frequent snacking  How many times a week on average do you eat food made away from home (restaurant/take-out)?: 0    Being Active:  Days per week of moderate to strenuous exercise (like a brisk walk): 1  On average, minutes per day of exercise at this level: 20  How intense was your typical exercise? : Moderate (like brisk walking)  Exercise Minutes per Week: 20  Barrier to exercise: Access    Monitoring:  Blood Glucose Meter: ContourNext  Times checking blood sugar at home (number): 4  Times checking blood sugar at home (per): Day  Blood glucose trend: Fluctuating      Taking Medications:  Diabetes Medication(s)     Insulin       insulin aspart (NOVOLOG PEN) 100 UNIT/ML pen    sliding scale as directed, max of 30 units daily     insulin aspart (NOVOLOG VIAL) 100 UNITS/ML vial    Sliding scale as directed, max of 30 units daily     LEVEMIR VIAL 100 UNIT/ML soln    Inject 24 Units Subcutaneous 2 times daily          Current Treatments: Insulin Injections  Dose schedule: Pre-breakfast, Pre-dinner, At bedtime  Given by: Patient      Reducing Risks:  CAD Risks: Diabetes Mellitus, Tobacco exposure  Has dilated eye exam at least once a year?: No  Sees dentist every 6 months?: No  Feet checked by healthcare provider in the last year?: Yes    Healthy Coping:  Informal Support system:: Partner  Stage of change: PREPARATION  "(Decided to change - considering how)  Support resources: Offerings in Clinic Communities  Patient Activation Measure Survey Score:  No flowsheet data found.    Diabetes knowledge and skills assessment:   Patient is knowledgeable in diabetes management concepts related to: Being Active, Monitoring and Taking Medication    Patient needs further education on the following diabetes management concepts: Healthy Eating, Being Active, Monitoring, Taking Medication, Problem Solving, Reducing Risks and Healthy Coping    Based on learning assessment above, most appropriate setting for further diabetes education would be: Group class or Individual setting.      INTERVENTIONS:    Education provided today on:  AADE Self-Care Behaviors:  Monitoring: CGM options  Taking Medication: action of prescribed medication and discussion of different insulin profiles.  Problem Solving: high blood glucose - causes, signs/symptoms, treatment and prevention, low blood glucose - causes, signs/symptoms, treatment and prevention and Rule of 15 for Treatment of Hypoglycemia.    Opportunities for ongoing education and support in diabetes-self management were discussed.    Pt verbalized understanding of concepts discussed and recommendations provided today.       Education Materials Provided:  My Insulin Plan, My Food Plan, Bolus Calculator sheet, Omnipod brochure        ASSESSMENT:  Patient interested in a plan to help decrease risk of the highs and lows of type 1 diabetes.  He was diagnosed in 1982, shares his readings are up and down \"higher than 500 and lower than 60\" mg/dL.  He is interested in insulin pump and CGM.  He was discouraged with FreeStyle Yola 14-day CGM as it fell off his arm too easy.    Reviewed insulin action with My Insulin Plan.  Patient currently taking Levemir 24 units BID and Novolog for CHO and correction, \"I just take what I need, I don't really know how much I take daily.\"    Rule of 15 for hypoglycemia discussed to " help decrease risk of rebound hyperglycemia with over-treatment of hypoglycemia.    Discussed options of insulin pumps and recommend patient begin using ICR and ISF to help find ratios which can help him be more successful with insulin pump therapy.    Patient interested in learning more about using ICR and ISF.  He shares he takes 7 units to bring his BG down from 377 mg/dL.  He also takes Novolog, about 12 units, per meal, but is unsure how many CHO grams he consumes.     Bolus calculator sheet given with steps to take to correct high BG.  Patient Insulin Sensitivity Factor (ISF) 1:25 mg/dL.  Reviewed CHO counting and My Food Plan.  Practiced counting carb and figuring insulin using Insulin to Carb Ratio (ICR) 1:5 grams.  Patient accepted examples using ISF and ICR and would like to try figuring out his Novolog with those factors.    Recommend patient begin using CGM before moving to an insulin pump.  Patient interested in trying Dexcom G6 CGM.  Patient has Medicaid insurance and current prerequisites for insurance coverage of CGM:  1.  Patient has diabetes:  YES    Patient meets 1 of 1 required criteria for insurance coverage of CGM.    Patient would like prescriptions for Dexcom sent to [facility].      Recommend patient follow up for BG assessment in 2 - 3 weeks.               Patient's most recent   Lab Results   Component Value Date    A1C 10.0 02/18/2022    is not meeting goal of <7.0    PLAN  See Patient Instructions for co-developed, patient-stated behavior change goals.  AVS printed and provided to patient today. See Follow-Up section for recommended follow-up.    Ayanna Cabrera RN, BSN, [facility]  2/23/2022 1:31 PM   Time Spent: 60 minutes  Encounter Type: Individual    Any diabetes medication dose changes were made via the CDE Protocol and Collaborative Practice Agreement with the patient's primary care provider. A copy of this encounter was shared with the provider.

## 2022-02-23 NOTE — TELEPHONE ENCOUNTER
Patient requests to use Dexcom G6 CGM for glucose monitoring.  Patient has Medicaid insurance and currently meets 1 of 1 criteria for coverage:    1.  Patient has diabetes:  YES       If accepted as written, please sign pending orders.     Thank you,     Ayanna Cabrera RN, BSN, [facility]  2/23/2022 1:35 PM

## 2022-03-04 ENCOUNTER — HOSPITAL ENCOUNTER (OUTPATIENT)
Dept: ULTRASOUND IMAGING | Facility: OTHER | Age: 47
Discharge: HOME | End: 2022-03-04
Attending: FAMILY MEDICINE | Admitting: FAMILY MEDICINE
Payer: MEDICAID

## 2022-03-04 DIAGNOSIS — M79.89 SOFT TISSUE MASS: ICD-10-CM

## 2022-03-04 PROCEDURE — 76882 US LMTD JT/FCL EVL NVASC XTR: CPT

## 2022-03-09 RX ORDER — PROCHLORPERAZINE 25 MG/1
SUPPOSITORY RECTAL
Qty: 1 EACH | Refills: 3 | Status: SHIPPED | OUTPATIENT
Start: 2022-03-09 | End: 2023-01-01

## 2022-03-09 RX ORDER — PROCHLORPERAZINE 25 MG/1
SUPPOSITORY RECTAL
Qty: 1 EACH | Refills: 0 | Status: SHIPPED | OUTPATIENT
Start: 2022-03-09 | End: 2023-01-01

## 2022-03-09 RX ORDER — PROCHLORPERAZINE 25 MG/1
SUPPOSITORY RECTAL
Qty: 9 EACH | Refills: 3 | Status: SHIPPED | OUTPATIENT
Start: 2022-03-09 | End: 2023-01-01

## 2022-03-10 ENCOUNTER — TELEPHONE (OUTPATIENT)
Dept: FAMILY MEDICINE | Facility: OTHER | Age: 47
End: 2022-03-10
Payer: MEDICAID

## 2022-03-10 ENCOUNTER — APPOINTMENT (OUTPATIENT)
Dept: FAMILY MEDICINE | Facility: OTHER | Age: 47
End: 2022-03-10
Attending: NURSE PRACTITIONER

## 2022-03-10 ENCOUNTER — HOSPITAL ENCOUNTER (OUTPATIENT)
Dept: GENERAL RADIOLOGY | Facility: OTHER | Age: 47
Discharge: HOME | End: 2022-03-10
Attending: NURSE PRACTITIONER

## 2022-03-10 DIAGNOSIS — M19.049 HAND ARTHRITIS: ICD-10-CM

## 2022-03-10 PROCEDURE — 99499 UNLISTED E&M SERVICE: CPT | Performed by: NURSE PRACTITIONER

## 2022-03-10 PROCEDURE — 73130 X-RAY EXAM OF HAND: CPT | Mod: LT

## 2022-03-10 PROCEDURE — 73120 X-RAY EXAM OF HAND: CPT | Performed by: NURSE PRACTITIONER

## 2022-03-10 NOTE — TELEPHONE ENCOUNTER
Patient notified of ultrasound results as requested by Dr Johnson. See result note in chart.  Jeannie Vargas LPN on 3/10/2022 at 3:17 PM

## 2022-03-10 NOTE — TELEPHONE ENCOUNTER
SSA exam today.  No provider notes to pass along any messages to this patient.    Minna Wisdom LPN 3/10/2022 3:16 PM

## 2022-03-18 ENCOUNTER — ALLIED HEALTH/NURSE VISIT (OUTPATIENT)
Dept: FAMILY MEDICINE | Facility: OTHER | Age: 47
End: 2022-03-18
Attending: SURGERY
Payer: MEDICAID

## 2022-03-18 DIAGNOSIS — K62.5 RECTAL BLEEDING: ICD-10-CM

## 2022-03-18 PROCEDURE — U0005 INFEC AGEN DETEC AMPLI PROBE: HCPCS | Mod: ZL

## 2022-03-18 PROCEDURE — C9803 HOPD COVID-19 SPEC COLLECT: HCPCS

## 2022-03-18 NOTE — PROGRESS NOTES
Patient here for Covid Testing. Pre op 3/22/22 HUMBERTO.    Dolores Nava MA on 3/18/2022 at 10:40 AM

## 2022-03-19 LAB — SARS-COV-2 RNA RESP QL NAA+PROBE: NEGATIVE

## 2022-03-22 ENCOUNTER — ANESTHESIA EVENT (OUTPATIENT)
Dept: SURGERY | Facility: OTHER | Age: 47
End: 2022-03-22
Payer: MEDICAID

## 2022-03-22 ENCOUNTER — ANESTHESIA (OUTPATIENT)
Dept: SURGERY | Facility: OTHER | Age: 47
End: 2022-03-22
Payer: MEDICAID

## 2022-03-22 ENCOUNTER — HOSPITAL ENCOUNTER (OUTPATIENT)
Facility: OTHER | Age: 47
Discharge: HOME | End: 2022-03-22
Attending: SURGERY | Admitting: SURGERY
Payer: MEDICAID

## 2022-03-22 VITALS
DIASTOLIC BLOOD PRESSURE: 78 MMHG | WEIGHT: 196 LBS | TEMPERATURE: 97.2 F | HEART RATE: 70 BPM | BODY MASS INDEX: 28.94 KG/M2 | RESPIRATION RATE: 16 BRPM | OXYGEN SATURATION: 95 % | SYSTOLIC BLOOD PRESSURE: 120 MMHG

## 2022-03-22 LAB — GLUCOSE BLDC GLUCOMTR-MCNC: 213 MG/DL (ref 70–99)

## 2022-03-22 PROCEDURE — 45385 COLONOSCOPY W/LESION REMOVAL: CPT | Mod: PT | Performed by: SURGERY

## 2022-03-22 PROCEDURE — 250N000011 HC RX IP 250 OP 636: Performed by: NURSE ANESTHETIST, CERTIFIED REGISTERED

## 2022-03-22 PROCEDURE — 45380 COLONOSCOPY AND BIOPSY: CPT | Performed by: SURGERY

## 2022-03-22 PROCEDURE — 82962 GLUCOSE BLOOD TEST: CPT

## 2022-03-22 PROCEDURE — 250N000009 HC RX 250: Performed by: NURSE ANESTHETIST, CERTIFIED REGISTERED

## 2022-03-22 PROCEDURE — 258N000003 HC RX IP 258 OP 636: Performed by: SURGERY

## 2022-03-22 PROCEDURE — 88305 TISSUE EXAM BY PATHOLOGIST: CPT

## 2022-03-22 RX ORDER — NALOXONE HYDROCHLORIDE 0.4 MG/ML
0.4 INJECTION, SOLUTION INTRAMUSCULAR; INTRAVENOUS; SUBCUTANEOUS
Status: DISCONTINUED | OUTPATIENT
Start: 2022-03-22 | End: 2022-03-22 | Stop reason: HOSPADM

## 2022-03-22 RX ORDER — NALOXONE HYDROCHLORIDE 0.4 MG/ML
0.2 INJECTION, SOLUTION INTRAMUSCULAR; INTRAVENOUS; SUBCUTANEOUS
Status: DISCONTINUED | OUTPATIENT
Start: 2022-03-22 | End: 2022-03-22 | Stop reason: HOSPADM

## 2022-03-22 RX ORDER — LIDOCAINE 40 MG/G
CREAM TOPICAL
Status: DISCONTINUED | OUTPATIENT
Start: 2022-03-22 | End: 2022-03-22 | Stop reason: HOSPADM

## 2022-03-22 RX ORDER — PROPOFOL 10 MG/ML
INJECTION, EMULSION INTRAVENOUS CONTINUOUS PRN
Status: DISCONTINUED | OUTPATIENT
Start: 2022-03-22 | End: 2022-03-22

## 2022-03-22 RX ORDER — SODIUM CHLORIDE, SODIUM LACTATE, POTASSIUM CHLORIDE, CALCIUM CHLORIDE 600; 310; 30; 20 MG/100ML; MG/100ML; MG/100ML; MG/100ML
INJECTION, SOLUTION INTRAVENOUS CONTINUOUS
Status: DISCONTINUED | OUTPATIENT
Start: 2022-03-22 | End: 2022-03-22 | Stop reason: HOSPADM

## 2022-03-22 RX ORDER — PROPOFOL 10 MG/ML
INJECTION, EMULSION INTRAVENOUS PRN
Status: DISCONTINUED | OUTPATIENT
Start: 2022-03-22 | End: 2022-03-22

## 2022-03-22 RX ORDER — FLUMAZENIL 0.1 MG/ML
0.2 INJECTION, SOLUTION INTRAVENOUS
Status: DISCONTINUED | OUTPATIENT
Start: 2022-03-22 | End: 2022-03-22 | Stop reason: HOSPADM

## 2022-03-22 RX ORDER — LIDOCAINE HYDROCHLORIDE 20 MG/ML
INJECTION, SOLUTION INFILTRATION; PERINEURAL PRN
Status: DISCONTINUED | OUTPATIENT
Start: 2022-03-22 | End: 2022-03-22

## 2022-03-22 RX ADMIN — LIDOCAINE HYDROCHLORIDE 60 MG: 20 INJECTION, SOLUTION INFILTRATION; PERINEURAL at 09:19

## 2022-03-22 RX ADMIN — SODIUM CHLORIDE, POTASSIUM CHLORIDE, SODIUM LACTATE AND CALCIUM CHLORIDE: 600; 310; 30; 20 INJECTION, SOLUTION INTRAVENOUS at 09:05

## 2022-03-22 RX ADMIN — PROPOFOL 160 MCG/KG/MIN: 10 INJECTION, EMULSION INTRAVENOUS at 09:19

## 2022-03-22 RX ADMIN — PROPOFOL 100 MG: 10 INJECTION, EMULSION INTRAVENOUS at 09:19

## 2022-03-22 NOTE — ANESTHESIA PREPROCEDURE EVALUATION
Anesthesia Pre-Procedure Evaluation    Patient: Malik Melendez   MRN: [id number] : 1975        Procedure : Procedure(s):  COLONOSCOPY          History reviewed. No pertinent past medical history.   History reviewed. No pertinent surgical history.   No Known Allergies   Social History     Tobacco Use     Smoking status: Former Smoker     Types: Cigarettes     Smokeless tobacco: Current User   Substance Use Topics     Alcohol use: No      Wt Readings from Last 1 Encounters:   22 91.6 kg (202 lb)        Anesthesia Evaluation   Pt has had prior anesthetic.     No history of anesthetic complications       ROS/MED HX  ENT/Pulmonary:  - neg pulmonary ROS     Neurologic:  - neg neurologic ROS     Cardiovascular:     (+) Dyslipidemia -----    METS/Exercise Tolerance: >4 METS    Hematologic:  - neg hematologic  ROS     Musculoskeletal:  - neg musculoskeletal ROS     GI/Hepatic:     (+) bowel prep,     Renal/Genitourinary:  - neg Renal ROS     Endo:     (+) type I DM, Last HgA1c: 10, date: 22, Using insulin, - not using insulin pump.     Psychiatric/Substance Use:     (+) psychiatric history anxiety     Infectious Disease:  - neg infectious disease ROS     Malignancy:  - neg malignancy ROS     Other:            Physical Exam    Airway        Mallampati: I   TM distance: > 3 FB   Neck ROM: full   Mouth opening: > 3 cm    Respiratory Devices and Support         Dental  no notable dental history         Cardiovascular          Rhythm and rate: regular and normal     Pulmonary   pulmonary exam normal        breath sounds clear to auscultation           OUTSIDE LABS:  CBC:   Lab Results   Component Value Date    WBC 8.7 2022    WBC 8.5 2021    HGB 15.3 2022    HGB 15.8 2021    HCT 44.3 2022    HCT 44.4 2021     2022     2021     BMP:   Lab Results   Component Value Date     (L) 2022     2021    POTASSIUM 4.9 2022     POTASSIUM 3.8 06/19/2021    CHLORIDE 99 02/18/2022    CHLORIDE 100 06/19/2021    CO2 28 02/18/2022    CO2 29 06/19/2021    BUN 13 02/18/2022    BUN 21 06/19/2021    CR 1.15 02/18/2022    CR 1.13 06/19/2021     (H) 02/18/2022    GLC 62 (L) 06/19/2021     COAGS: No results found for: PTT, INR, FIBR  POC:   Lab Results   Component Value Date     (H) 01/11/2008     HEPATIC:   Lab Results   Component Value Date    ALBUMIN 4.3 02/18/2022    PROTTOTAL 6.1 (L) 02/18/2022    ALT 11 02/18/2022    AST 19 02/18/2022    GGT 29 04/03/2007    ALKPHOS 52 02/18/2022    BILITOTAL 0.4 02/18/2022     OTHER:   Lab Results   Component Value Date    A1C 10.0 (H) 02/18/2022    SRAVAN 9.3 02/18/2022    LIPASE 38.6 07/06/2016    TSH 1.90 02/18/2022    CRP 0.1 09/13/2018       Anesthesia Plan    ASA Status:  2   NPO Status:  NPO Appropriate    Anesthesia Type: MAC.     - Reason for MAC: straight local not clinically adequate   Induction: Intravenous.           Consents    Anesthesia Plan(s) and associated risks, benefits, and realistic alternatives discussed. Questions answered and patient/representative(s) expressed understanding.    - Discussed: Risks, Benefits and Alternatives for BOTH SEDATION and the PROCEDURE were discussed     - Discussed with:          Postoperative Care            Comments:                ALEXANDRO PETERSON CRNA

## 2022-03-22 NOTE — ANESTHESIA POSTPROCEDURE EVALUATION
Patient: Malik Melendez    Procedure: Procedure(s):  COLONOSCOPY, WITH POLYPECTOMY       Anesthesia Type:  MAC    Note:  Disposition: Outpatient   Postop Pain Control: Uneventful            Sign Out: Well controlled pain   PONV: No   Neuro/Psych: Uneventful            Sign Out: Acceptable/Baseline neuro status   Airway/Respiratory: Uneventful            Sign Out: Acceptable/Baseline resp. status   CV/Hemodynamics: Uneventful            Sign Out: Acceptable CV status   Other NRE: NONE   DID A NON-ROUTINE EVENT OCCUR? No           Last vitals:  Vitals Value Taken Time   /78 03/22/22 1000   Temp     Pulse 70 03/22/22 1000   Resp 16 03/22/22 1000   SpO2 89 % 03/22/22 1010   Vitals shown include unvalidated device data.    Electronically Signed By: ALEXANDRO Christianson CRNA  March 22, 2022  11:44 AM

## 2022-03-22 NOTE — H&P
GENERAL SURGERY CONSULTATION NOTE    Malik Melendez   [address]   [place] [address]  47 year old  male    Primary Care Provider:  Josué Puentes      HPI: Malik Melendez presents to day surgery in need of colonoscopy for family history of colon cancer.   Malik Melendez has family history of colon cancer in mother - diagnosed in her 70's. Patient denies change in bowel habits. Has seen blood in the stool in the remote past. Previous colonoscopy was never.     REVIEW OF SYSTEMS:    GENERAL: No fevers or chills. Denies fatigue, recent weight loss.  HEENT: No sinus drainage. No changes with vision or hearing. No difficulty swallowing.   LYMPHATICS:  Noswollen nodes in axilla, neck or groin.  CARDIOVASCULAR: Denies chest pain, palpitations and dyspnea on exertion.  PULMONARY: No shortness of breath or cough. No increase in sputum production.  GI: Denies melena,bright red blood in stools. No hematemesis. No constipation or diarrhea.  : No dysuria or hematuria.  SKIN: No recent rashes or ulcers.   HEMATOLOGY:  No history of easy bruising or bleeding.  ENDOCRINE:  No history of diabetes or thyroid problems.  NEUROLOGY:  No history of seizures or headaches. No motor or sensory changes.        Patient Active Problem List   Diagnosis     Bilateral carpal tunnel syndrome     Ava nodes (DJD hand)     Diabetes mellitus type I (H)     Generalized anxiety disorder     Hyperlipidemia     Major depressive disorder, recurrent episode, moderate (H)     Chest wall pain     Attention deficit hyperactivity disorder (ADHD)     Drug-induced mood disorder (H)     Personality disorder (H)     Major depressive disorder, single episode       History reviewed. No pertinent past medical history.    History reviewed. No pertinent surgical history.    Family History   Problem Relation Age of Onset     Colon Cancer Mother        Social History     Social History Narrative     Not on file       Social History     Socioeconomic History     Marital  "status: Single     Spouse name: Not on file     Number of children: Not on file     Years of education: Not on file     Highest education level: Not on file   Occupational History     Not on file   Tobacco Use     Smoking status: Former Smoker     Types: Cigarettes     Smokeless tobacco: Current User   Vaping Use     Vaping Use: Never used   Substance and Sexual Activity     Alcohol use: No     Drug use: Yes     Types: Marijuana     Sexual activity: Not Currently   Other Topics Concern     Not on file   Social History Narrative     Not on file     Social Determinants of Health     Financial Resource Strain: Not on file   Food Insecurity: Not on file   Transportation Needs: Not on file   Physical Activity: Not on file   Stress: Not on file   Social Connections: Not on file   Intimate Partner Violence: Not on file   Housing Stability: Not on file       No current facility-administered medications on file prior to encounter.  atorvastatin (LIPITOR) 10 MG tablet, Take 1 tablet (10 mg) by mouth daily  insulin aspart (NOVOLOG PEN) 100 UNIT/ML pen, sliding scale as directed, max of 30 units daily  LEVEMIR VIAL 100 UNIT/ML soln, Inject 24 Units Subcutaneous 2 times daily  lisinopril (ZESTRIL) 20 MG tablet, Take 1 tablet (20 mg) by mouth daily  BD INSULIN SYRINGE U/F 31G X 5/16\" 1 ML miscellaneous, USE THREE TIMES DAILY AS DIRECTED  blood glucose (NO BRAND SPECIFIED) lancing device, Device to be used with lancets.  blood glucose (NO BRAND SPECIFIED) test strip, Use to test blood sugar 3 times daily or as directed.  blood glucose monitoring (AMANDA CONTOUR) test strip, 4 times daily  insulin aspart (NOVOLOG VIAL) 100 UNITS/ML vial, Sliding scale as directed, max of 30 units daily  insulin pen needle (B-D U/F) 31G X 8 MM, 4 times daily as needed  syringe, disposable, 1 ML MISC, As directed. may use 1 per day for diabetic meds (lantus)  thin (NO BRAND SPECIFIED) lancets, As directed. Test 4 times per " day.          ALLERGIES/SENSITIVITIES: No Known Allergies    PHYSICAL EXAM:     BP (!) 155/98   Pulse 72   Temp 97.2  F (36.2  C) (Tympanic)   Resp 18   Wt 88.9 kg (196 lb)   SpO2 99%   BMI 28.94 kg/m      General Appearance:   Sitting up in bed, no apparent distress  HEENT: Pupils are equal and reactive, no scleral icterus   Heart & CV:  RRR, no murmur.  LUNGS: No increased work of breathing. Lungs are CTA B/L, no wheezing or crackles.  Abd:  soft, non-tender, no masses   Ext: no lower extremity edema   Neuro: alert and oriented, normal speech and mentation         CONSULTATION ASSESSMENT AND PLAN:    47 year old male with increased risk of colon cancer due to family history in mother.      The technical details of colonoscopy were discussed with the patient along with the risks and benefits to include bleeding, perforation and incomplete study. Malik Melendez demonstrated understanding and is willing to proceed.       Logan Renee MD on 3/22/2022 at 9:09 AM

## 2022-03-22 NOTE — DISCHARGE INSTRUCTIONS
[facility]  Adult Discharge Orders & Instructions    ________________________________________________________________          For 12 hours after surgery  1. Get plenty of rest.  A responsible adult must stay with you for at least 12 hours after you leave the hospital.   2. You may feel lightheaded.  IF so, sit for a few minutes before standing.  Have someone help you get up.   3. You may have a slight fever. Call the doctor if your fever is over 101 F (38.3 C) (taken under the tongue) or lasts longer than 24 hours.  4. You may have a dry mouth, a sore throat, muscle aches or trouble sleeping.  These should go away after 24 hours.  5. Do not make important or legal decisions.  6.   Do not drive or use heavy equipment.  If you have weakness or tingling, don't drive or use heavy equipment until this feeling goes away.    To contact a doctor, call   [phone]_______________________

## 2022-03-22 NOTE — ANESTHESIA CARE TRANSFER NOTE
Patient: Malik Melendez    Procedure: Procedure(s):  COLONOSCOPY, WITH POLYPECTOMY       Diagnosis: Family hx of colon cancer [Z80.0]  Rectal bleeding [K62.5]  Diagnosis Additional Information: No value filed.    Anesthesia Type:   MAC     Note:    Oropharynx: oropharynx clear of all foreign objects  Level of Consciousness: drowsy  Oxygen Supplementation: nasal cannula  Level of Supplemental Oxygen (L/min / FiO2): 2    Dentition: dentition unchanged  Vital Signs Stable: post-procedure vital signs reviewed and stable  Report to RN Given: handoff report given  Patient transferred to: Phase II    Handoff Report: Identifed the Patient, Identified the Reponsible Provider, Reviewed the pertinent medical history, Discussed the surgical course, Reviewed Intra-OP anesthesia mangement and issues during anesthesia, Set expectations for post-procedure period and Allowed opportunity for questions and acknowledgement of understanding      Vitals:  Vitals Value Taken Time   BP     Temp     Pulse     Resp     SpO2         Electronically Signed By: ALEXANDRO Christianson CRNA  March 22, 2022  9:47 AM

## 2022-03-22 NOTE — OP NOTE
COLONOSCOPY PROCEDURE NOTE    DATE OF SERVICE: 3/22/2022    SURGEON: DANIELA Renee MD     PRE-OP DIAGNOSIS:    Family history of colon cancer    POST-OP DIAGNOSIS:    Polyps at transverse colon    PROCEDURE:   Colonoscopy with snare polypectomy    ASSISTANT:  Circulator: Cinthia Fraser RN  Scrub Person: Selma Choudhury    ANESTHESIA:  MAC                            Monitor Anesthesia CareCRNA Independent: Edgardo Jesus APRN CRNA    INDICATION FOR THE PROCEDURE: The patient is a 47 year old male with family history of colon cancer. The patient has no other complaints.  After explaining the risks to include bleeding, perforation, potential inability to reach the cecum the patient wishes to proceed.    PROCEDURE: After adequate sedation, the patient was in the left lateral decubitus position.  Rectal exam was performed.  There was normal tone and no palpable masses.  The colonoscope was introduced into the rectum and advanced to the cecum with Mild difficulty.  The patient's prep was fair.  The terminal cecum was reached.  The cecum, ascending, transverse, descending and sigmoid colon were significant for one 4mm polyp in the transverse colon removed with cold snare.  The scope was retroflexed in the rectum.  The anorectal junction was unremarkable.  The scope was straightened and removed.  The patient tolerated the procedure well.     ESTIMATED BLOOD LOSS: none    COMPLICATIONS:  None    TISSUE REMOVED:  Yes    RECOMMEND:    Follow-up in 5 years        DANIELA Renee MD

## 2022-03-23 LAB
PATH REPORT.COMMENTS IMP SPEC: NORMAL
PATH REPORT.FINAL DX SPEC: NORMAL
PATH REPORT.RELEVANT HX SPEC: NORMAL
PHOTO IMAGE: NORMAL

## 2022-04-01 ENCOUNTER — TELEPHONE (OUTPATIENT)
Dept: SURGERY | Facility: OTHER | Age: 47
End: 2022-04-01
Payer: MEDICAID

## 2022-04-14 ENCOUNTER — APPOINTMENT (OUTPATIENT)
Dept: CT IMAGING | Facility: OTHER | Age: 47
End: 2022-04-14
Attending: PHYSICIAN ASSISTANT
Payer: MEDICAID

## 2022-04-14 ENCOUNTER — HOSPITAL ENCOUNTER (EMERGENCY)
Facility: OTHER | Age: 47
Discharge: HOME | End: 2022-04-14
Attending: PHYSICIAN ASSISTANT | Admitting: PHYSICIAN ASSISTANT
Payer: MEDICAID

## 2022-04-14 ENCOUNTER — TELEPHONE (OUTPATIENT)
Dept: FAMILY MEDICINE | Facility: OTHER | Age: 47
End: 2022-04-14

## 2022-04-14 VITALS
TEMPERATURE: 96.6 F | SYSTOLIC BLOOD PRESSURE: 111 MMHG | RESPIRATION RATE: 16 BRPM | OXYGEN SATURATION: 98 % | WEIGHT: 190 LBS | DIASTOLIC BLOOD PRESSURE: 73 MMHG | BODY MASS INDEX: 28.06 KG/M2 | HEART RATE: 78 BPM

## 2022-04-14 DIAGNOSIS — E86.0 DEHYDRATION: ICD-10-CM

## 2022-04-14 DIAGNOSIS — E83.42 HYPOMAGNESEMIA: ICD-10-CM

## 2022-04-14 DIAGNOSIS — R42 DIZZINESS: ICD-10-CM

## 2022-04-14 DIAGNOSIS — H65.91 OME (OTITIS MEDIA WITH EFFUSION), RIGHT: ICD-10-CM

## 2022-04-14 LAB
ALBUMIN SERPL-MCNC: 4 G/DL (ref 3.5–5.7)
ALBUMIN UR-MCNC: NEGATIVE MG/DL
ALP SERPL-CCNC: 43 U/L (ref 34–104)
ALT SERPL W P-5'-P-CCNC: 10 U/L (ref 7–52)
ANION GAP SERPL CALCULATED.3IONS-SCNC: 5 MMOL/L (ref 3–14)
APPEARANCE UR: CLEAR
AST SERPL W P-5'-P-CCNC: 11 U/L (ref 13–39)
BASOPHILS # BLD AUTO: 0 10E3/UL (ref 0–0.2)
BASOPHILS NFR BLD AUTO: 0 %
BILIRUB SERPL-MCNC: 0.7 MG/DL (ref 0.3–1)
BILIRUB UR QL STRIP: NEGATIVE
BUN SERPL-MCNC: 24 MG/DL (ref 7–25)
CALCIUM SERPL-MCNC: 9.6 MG/DL (ref 8.6–10.3)
CHLORIDE BLD-SCNC: 97 MMOL/L (ref 98–107)
CO2 SERPL-SCNC: 30 MMOL/L (ref 21–31)
COLOR UR AUTO: YELLOW
CREAT SERPL-MCNC: 1.26 MG/DL (ref 0.7–1.3)
CRP SERPL-MCNC: 2.3 MG/L
EOSINOPHIL # BLD AUTO: 0.1 10E3/UL (ref 0–0.7)
EOSINOPHIL NFR BLD AUTO: 1 %
ERYTHROCYTE [DISTWIDTH] IN BLOOD BY AUTOMATED COUNT: 11.6 % (ref 10–15)
ERYTHROCYTE [SEDIMENTATION RATE] IN BLOOD BY WESTERGREN METHOD: 6 MM/HR (ref 0–15)
FLUAV RNA SPEC QL NAA+PROBE: NEGATIVE
FLUBV RNA RESP QL NAA+PROBE: NEGATIVE
GFR SERPL CREATININE-BSD FRML MDRD: 70 ML/MIN/1.73M2
GLUCOSE BLD-MCNC: 361 MG/DL (ref 70–105)
GLUCOSE BLDC GLUCOMTR-MCNC: 135 MG/DL (ref 70–99)
GLUCOSE BLDC GLUCOMTR-MCNC: 260 MG/DL (ref 70–99)
GLUCOSE BLDC GLUCOMTR-MCNC: 374 MG/DL (ref 70–99)
GLUCOSE UR STRIP-MCNC: >1000 MG/DL
HCT VFR BLD AUTO: 46.6 % (ref 40–53)
HGB BLD-MCNC: 16.5 G/DL (ref 13.3–17.7)
HGB UR QL STRIP: NEGATIVE
IMM GRANULOCYTES # BLD: 0 10E3/UL
IMM GRANULOCYTES NFR BLD: 0 %
INR PPP: 0.98 (ref 0.85–1.15)
KETONES BLD-SCNC: 0.4 MMOL/L (ref 0–0.6)
KETONES UR STRIP-MCNC: 10 MG/DL
LACTATE SERPL-SCNC: 1.4 MMOL/L (ref 0.7–2)
LEUKOCYTE ESTERASE UR QL STRIP: NEGATIVE
LYMPHOCYTES # BLD AUTO: 2.2 10E3/UL (ref 0.8–5.3)
LYMPHOCYTES NFR BLD AUTO: 23 %
MAGNESIUM SERPL-MCNC: 1.8 MG/DL (ref 1.9–2.7)
MCH RBC QN AUTO: 30.4 PG (ref 26.5–33)
MCHC RBC AUTO-ENTMCNC: 35.4 G/DL (ref 31.5–36.5)
MCV RBC AUTO: 86 FL (ref 78–100)
MONOCYTES # BLD AUTO: 0.7 10E3/UL (ref 0–1.3)
MONOCYTES NFR BLD AUTO: 7 %
NEUTROPHILS # BLD AUTO: 6.6 10E3/UL (ref 1.6–8.3)
NEUTROPHILS NFR BLD AUTO: 69 %
NITRATE UR QL: NEGATIVE
NRBC # BLD AUTO: 0 10E3/UL
NRBC BLD AUTO-RTO: 0 /100
NT-PROBNP SERPL-MCNC: 13 PG/ML (ref 0–100)
PH UR STRIP: 5.5 [PH] (ref 5–9)
PLATELET # BLD AUTO: 289 10E3/UL (ref 150–450)
POTASSIUM BLD-SCNC: 4.6 MMOL/L (ref 3.5–5.1)
PROT SERPL-MCNC: 6.6 G/DL (ref 6.4–8.9)
RBC # BLD AUTO: 5.42 10E6/UL (ref 4.4–5.9)
RBC URINE: <1 /HPF
RSV RNA SPEC NAA+PROBE: NEGATIVE
SARS-COV-2 RNA RESP QL NAA+PROBE: NEGATIVE
SODIUM SERPL-SCNC: 132 MMOL/L (ref 134–144)
SP GR UR STRIP: 1.02 (ref 1–1.03)
TROPONIN I SERPL-MCNC: <2.4 PG/ML (ref 0–34)
TSH SERPL DL<=0.005 MIU/L-ACNC: 0.73 MU/L (ref 0.4–4)
UROBILINOGEN UR STRIP-MCNC: NORMAL MG/DL
WBC # BLD AUTO: 9.5 10E3/UL (ref 4–11)
WBC URINE: 0 /HPF

## 2022-04-14 PROCEDURE — 84443 ASSAY THYROID STIM HORMONE: CPT | Performed by: PHYSICIAN ASSISTANT

## 2022-04-14 PROCEDURE — 96375 TX/PRO/DX INJ NEW DRUG ADDON: CPT | Mod: XU | Performed by: PHYSICIAN ASSISTANT

## 2022-04-14 PROCEDURE — 250N000011 HC RX IP 250 OP 636: Performed by: PHYSICIAN ASSISTANT

## 2022-04-14 PROCEDURE — 81001 URINALYSIS AUTO W/SCOPE: CPT | Performed by: PHYSICIAN ASSISTANT

## 2022-04-14 PROCEDURE — 86140 C-REACTIVE PROTEIN: CPT | Performed by: PHYSICIAN ASSISTANT

## 2022-04-14 PROCEDURE — 87040 BLOOD CULTURE FOR BACTERIA: CPT | Performed by: PHYSICIAN ASSISTANT

## 2022-04-14 PROCEDURE — 83605 ASSAY OF LACTIC ACID: CPT | Performed by: PHYSICIAN ASSISTANT

## 2022-04-14 PROCEDURE — 258N000003 HC RX IP 258 OP 636: Performed by: PHYSICIAN ASSISTANT

## 2022-04-14 PROCEDURE — 82010 KETONE BODYS QUAN: CPT | Performed by: PHYSICIAN ASSISTANT

## 2022-04-14 PROCEDURE — 70450 CT HEAD/BRAIN W/O DYE: CPT | Mod: XU

## 2022-04-14 PROCEDURE — 87637 SARSCOV2&INF A&B&RSV AMP PRB: CPT | Performed by: PHYSICIAN ASSISTANT

## 2022-04-14 PROCEDURE — 83735 ASSAY OF MAGNESIUM: CPT | Performed by: PHYSICIAN ASSISTANT

## 2022-04-14 PROCEDURE — 70496 CT ANGIOGRAPHY HEAD: CPT

## 2022-04-14 PROCEDURE — 250N000009 HC RX 250: Performed by: PHYSICIAN ASSISTANT

## 2022-04-14 PROCEDURE — 36415 COLL VENOUS BLD VENIPUNCTURE: CPT | Performed by: PHYSICIAN ASSISTANT

## 2022-04-14 PROCEDURE — 85652 RBC SED RATE AUTOMATED: CPT | Performed by: PHYSICIAN ASSISTANT

## 2022-04-14 PROCEDURE — 84484 ASSAY OF TROPONIN QUANT: CPT | Performed by: PHYSICIAN ASSISTANT

## 2022-04-14 PROCEDURE — 93010 ELECTROCARDIOGRAM REPORT: CPT | Performed by: INTERNAL MEDICINE

## 2022-04-14 PROCEDURE — 250N000013 HC RX MED GY IP 250 OP 250 PS 637: Performed by: PHYSICIAN ASSISTANT

## 2022-04-14 PROCEDURE — 85610 PROTHROMBIN TIME: CPT | Performed by: PHYSICIAN ASSISTANT

## 2022-04-14 PROCEDURE — 83880 ASSAY OF NATRIURETIC PEPTIDE: CPT | Performed by: PHYSICIAN ASSISTANT

## 2022-04-14 PROCEDURE — 93005 ELECTROCARDIOGRAM TRACING: CPT | Performed by: PHYSICIAN ASSISTANT

## 2022-04-14 PROCEDURE — C9803 HOPD COVID-19 SPEC COLLECT: HCPCS | Performed by: PHYSICIAN ASSISTANT

## 2022-04-14 PROCEDURE — 99285 EMERGENCY DEPT VISIT HI MDM: CPT | Mod: 25 | Performed by: PHYSICIAN ASSISTANT

## 2022-04-14 PROCEDURE — 85004 AUTOMATED DIFF WBC COUNT: CPT | Performed by: PHYSICIAN ASSISTANT

## 2022-04-14 PROCEDURE — 99284 EMERGENCY DEPT VISIT MOD MDM: CPT | Performed by: PHYSICIAN ASSISTANT

## 2022-04-14 PROCEDURE — 80053 COMPREHEN METABOLIC PANEL: CPT | Performed by: PHYSICIAN ASSISTANT

## 2022-04-14 PROCEDURE — 96365 THER/PROPH/DIAG IV INF INIT: CPT | Mod: XU | Performed by: PHYSICIAN ASSISTANT

## 2022-04-14 RX ORDER — CEFUROXIME AXETIL 500 MG/1
500 TABLET ORAL 2 TIMES DAILY
Qty: 20 TABLET | Refills: 0 | Status: SHIPPED | OUTPATIENT
Start: 2022-04-14 | End: 2022-04-24

## 2022-04-14 RX ORDER — PROPRANOLOL HYDROCHLORIDE 20 MG/1
20 TABLET ORAL 2 TIMES DAILY
COMMUNITY
Start: 2022-03-18

## 2022-04-14 RX ORDER — MECLIZINE HCL 12.5 MG 12.5 MG/1
25 TABLET ORAL ONCE
Status: COMPLETED | OUTPATIENT
Start: 2022-04-14 | End: 2022-04-14

## 2022-04-14 RX ORDER — SCOLOPAMINE TRANSDERMAL SYSTEM 1 MG/1
1 PATCH, EXTENDED RELEASE TRANSDERMAL
Status: DISCONTINUED | OUTPATIENT
Start: 2022-04-14 | End: 2022-04-14 | Stop reason: HOSPADM

## 2022-04-14 RX ORDER — DULOXETIN HYDROCHLORIDE 20 MG/1
20 CAPSULE, DELAYED RELEASE ORAL 2 TIMES DAILY
COMMUNITY
Start: 2022-03-18 | End: 2023-01-01

## 2022-04-14 RX ORDER — MECLIZINE HCL 12.5 MG 12.5 MG/1
50 TABLET ORAL 3 TIMES DAILY PRN
Qty: 30 TABLET | Refills: 0 | Status: SHIPPED | OUTPATIENT
Start: 2022-04-14 | End: 2022-06-13

## 2022-04-14 RX ORDER — SODIUM CHLORIDE 9 MG/ML
INJECTION, SOLUTION INTRAVENOUS CONTINUOUS
Status: DISCONTINUED | OUTPATIENT
Start: 2022-04-14 | End: 2022-04-14 | Stop reason: HOSPADM

## 2022-04-14 RX ORDER — IOPAMIDOL 755 MG/ML
100 INJECTION, SOLUTION INTRAVASCULAR ONCE
Status: COMPLETED | OUTPATIENT
Start: 2022-04-14 | End: 2022-04-14

## 2022-04-14 RX ORDER — PSEUDOEPHEDRINE HCL 30 MG
30 TABLET ORAL EVERY 4 HOURS PRN
Qty: 30 TABLET | Refills: 0 | Status: SHIPPED | OUTPATIENT
Start: 2022-04-14 | End: 2023-01-01

## 2022-04-14 RX ORDER — METHYLPREDNISOLONE SODIUM SUCCINATE 125 MG/2ML
125 INJECTION, POWDER, LYOPHILIZED, FOR SOLUTION INTRAMUSCULAR; INTRAVENOUS ONCE
Status: COMPLETED | OUTPATIENT
Start: 2022-04-14 | End: 2022-04-14

## 2022-04-14 RX ORDER — PSEUDOEPHEDRINE HCL 30 MG
30 TABLET ORAL ONCE
Status: COMPLETED | OUTPATIENT
Start: 2022-04-14 | End: 2022-04-14

## 2022-04-14 RX ORDER — SCOLOPAMINE TRANSDERMAL SYSTEM 1 MG/1
1 PATCH, EXTENDED RELEASE TRANSDERMAL
Qty: 5 PATCH | Refills: 0 | Status: SHIPPED | OUTPATIENT
Start: 2022-04-14 | End: 2022-04-27

## 2022-04-14 RX ORDER — DEXTROAMPHETAMINE/AMPHETAMINE 10 MG
CAPSULE, EXT RELEASE 24 HR ORAL
COMMUNITY
Start: 2022-03-18 | End: 2023-01-01

## 2022-04-14 RX ORDER — CEFTRIAXONE SODIUM 1 G/50ML
1 INJECTION, SOLUTION INTRAVENOUS ONCE
Status: COMPLETED | OUTPATIENT
Start: 2022-04-14 | End: 2022-04-14

## 2022-04-14 RX ADMIN — SCOPALAMINE 1 PATCH: 1 PATCH, EXTENDED RELEASE TRANSDERMAL at 17:30

## 2022-04-14 RX ADMIN — IOPAMIDOL 100 ML: 755 INJECTION, SOLUTION INTRAVENOUS at 16:03

## 2022-04-14 RX ADMIN — SODIUM CHLORIDE 1000 ML: 9 INJECTION, SOLUTION INTRAVENOUS at 15:02

## 2022-04-14 RX ADMIN — CEFTRIAXONE SODIUM 1 G: 1 INJECTION, SOLUTION INTRAVENOUS at 17:29

## 2022-04-14 RX ADMIN — SODIUM CHLORIDE: 9 INJECTION, SOLUTION INTRAVENOUS at 16:21

## 2022-04-14 RX ADMIN — PSEUDOEPHEDRINE HCL 30 MG: 30 TABLET, FILM COATED ORAL at 15:17

## 2022-04-14 RX ADMIN — MECLIZINE 25 MG: 12.5 TABLET ORAL at 17:29

## 2022-04-14 RX ADMIN — METHYLPREDNISOLONE SODIUM SUCCINATE 125 MG: 125 INJECTION, POWDER, FOR SOLUTION INTRAMUSCULAR; INTRAVENOUS at 15:17

## 2022-04-14 RX ADMIN — PSEUDOEPHEDRINE HCL 30 MG: 30 TABLET, FILM COATED ORAL at 17:29

## 2022-04-14 RX ADMIN — MECLIZINE 25 MG: 12.5 TABLET ORAL at 15:17

## 2022-04-14 ASSESSMENT — ENCOUNTER SYMPTOMS
FLANK PAIN: 0
FEVER: 0
STRIDOR: 0
TREMORS: 0
FACIAL SWELLING: 0
BACK PAIN: 0
EYE PAIN: 0
CONFUSION: 0
AGITATION: 0
DIZZINESS: 1
FACIAL ASYMMETRY: 0
SEIZURES: 0
ABDOMINAL PAIN: 0

## 2022-04-14 NOTE — ED PROVIDER NOTES
"  History     Chief Complaint   Patient presents with     Dizziness     HPI  Malik Melendez is a 47 year old male who reports that he woke up 2 days ago with severe dizziness and a feeling that \"the room is spinning\".  Denies any nausea or vomiting.  No lightheadedness.  No sore throat, cough, or flulike symptoms.  He reports he has received his entire COVID series.  This is never happened to him before.  Denies any other issues.  Past medical history is significant for discharge nodules, diabetes mellitus type 1, general anxiety disorder, hyperlipidemia, major depressive disorder, chest wall pain, ADHD, drug-induced mood disorder, and personality disorder.    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  Per history       Drug-induced mood disorder (H) 01/18/2008     Priority: Medium     Personality disorder (H) 01/18/2008     Priority: Medium     Formatting of this note might be different from the original.  Per history  Epic       Major depressive disorder, single episode 01/18/2008     Priority: Medium     Formatting of this note might be different from the original.  Per history  Epic          Past Medical History:    History reviewed. No pertinent past medical history.    Past Surgical History:    Past Surgical History: " "  Procedure Laterality Date     COLONOSCOPY N/A 3/22/2022    Procedure: COLONOSCOPY, WITH POLYPECTOMY;  Surgeon: Logan Renee MD;  Location: [facility] OR       Family History:    Family History   Problem Relation Age of Onset     Colon Cancer Mother        Social History:  Marital Status:  Single [1]  Social History     Tobacco Use     Smoking status: Former Smoker     Types: Cigarettes     Smokeless tobacco: Current User   Vaping Use     Vaping Use: Never used   Substance Use Topics     Alcohol use: No     Drug use: Yes     Types: Marijuana        Medications:    ADDERALL XR 10 MG 24 hr capsule  atorvastatin (LIPITOR) 10 MG tablet  BD INSULIN SYRINGE U/F 31G X 5/16\" 1 ML miscellaneous  blood glucose (NO BRAND SPECIFIED) lancing device  blood glucose (NO BRAND SPECIFIED) test strip  blood glucose monitoring (Notizza CONTOUR) test strip  Continuous Blood Gluc  (DEXCOM G6 ) DIANA  Continuous Blood Gluc Sensor (DEXCOM G6 SENSOR) MISC  Continuous Blood Gluc Transmit (DEXCOM G6 TRANSMITTER) MISC  DULoxetine (CYMBALTA) 20 MG capsule  insulin aspart (NOVOLOG PEN) 100 UNIT/ML pen  insulin aspart (NOVOLOG VIAL) 100 UNITS/ML vial  insulin pen needle (B-D U/F) 31G X 8 MM  LEVEMIR VIAL 100 UNIT/ML soln  lisinopril (ZESTRIL) 20 MG tablet  propranolol (INDERAL) 20 MG tablet  syringe, disposable, 1 ML MISC  thin (NO BRAND SPECIFIED) lancets          Review of Systems   Constitutional: Negative for fever.   HENT: Negative for drooling and facial swelling.    Eyes: Negative for pain and visual disturbance.   Respiratory: Negative for stridor.    Cardiovascular: Negative for chest pain.   Gastrointestinal: Negative for abdominal pain.   Genitourinary: Negative for flank pain.   Musculoskeletal: Negative for back pain.   Skin: Negative for pallor.   Neurological: Positive for dizziness. Negative for tremors, seizures and facial asymmetry.   Psychiatric/Behavioral: Negative for agitation and confusion.   All other " systems reviewed and are negative.      Physical Exam   BP: 136/86  Pulse: 78  Temp: (!) 96.6  F (35.9  C)  Resp: 16  Weight: 86.2 kg (190 lb)  SpO2: 99 %    Vitals:    04/14/22 1545 04/14/22 1815 04/14/22 1826 04/14/22 1830   BP:  117/62  111/73   Pulse:  71  78   Resp:       Temp:       SpO2: 98% 96% 98% 98%   Weight:           Physical Exam  Vitals and nursing note reviewed.   Constitutional:       General: He is not in acute distress.     Appearance: Normal appearance. He is not ill-appearing or toxic-appearing.   HENT:      Head: Normocephalic. No raccoon eyes, right periorbital erythema or left periorbital erythema.      Right Ear: No drainage or tenderness. A middle ear effusion is present. Tympanic membrane is injected, erythematous and bulging. Tympanic membrane is not perforated.      Left Ear: No drainage or tenderness.  No middle ear effusion. Tympanic membrane is not injected, perforated, erythematous or bulging.      Nose: Nose normal.   Eyes:      General: Lids are normal. Gaze aligned appropriately. No scleral icterus.     Extraocular Movements: Extraocular movements intact.   Neck:      Trachea: No tracheal deviation.   Cardiovascular:      Rate and Rhythm: Normal rate.   Pulmonary:      Effort: Pulmonary effort is normal. No respiratory distress.      Breath sounds: No stridor.   Abdominal:      Tenderness: There is no abdominal tenderness.   Musculoskeletal:         General: No deformity or signs of injury. Normal range of motion.      Cervical back: Normal range of motion. No signs of trauma.   Skin:     General: Skin is warm and dry.      Coloration: Skin is not jaundiced or pale.   Neurological:      General: No focal deficit present.      Mental Status: He is alert and oriented to person, place, and time.      GCS: GCS eye subscore is 4. GCS verbal subscore is 5. GCS motor subscore is 6.      Motor: No tremor or seizure activity.   Psychiatric:         Attention and Perception: Attention  normal.         Mood and Affect: Mood normal.         ED Course     EKG shows normal sinus rhythm with a heart rate of 72.    Results for orders placed or performed during the hospital encounter of 04/14/22 (from the past 24 hour(s))   CBC with platelets differential    Narrative    The following orders were created for panel order CBC with platelets differential.  Procedure                               Abnormality         Status                     ---------                               -----------         ------                     CBC with platelets and d...[[id number]]                      Final result                 Please view results for these tests on the individual orders.   INR   Result Value Ref Range    INR 0.98 0.85 - 1.15   Comprehensive metabolic panel   Result Value Ref Range    Sodium 132 (L) 134 - 144 mmol/L    Potassium 4.6 3.5 - 5.1 mmol/L    Chloride 97 (L) 98 - 107 mmol/L    Carbon Dioxide (CO2) 30 21 - 31 mmol/L    Anion Gap 5 3 - 14 mmol/L    Urea Nitrogen 24 7 - 25 mg/dL    Creatinine 1.26 0.70 - 1.30 mg/dL    Calcium 9.6 8.6 - 10.3 mg/dL    Glucose 361 (H) 70 - 105 mg/dL    Alkaline Phosphatase 43 34 - 104 U/L    AST 11 (L) 13 - 39 U/L    ALT 10 7 - 52 U/L    Protein Total 6.6 6.4 - 8.9 g/dL    Albumin 4.0 3.5 - 5.7 g/dL    Bilirubin Total 0.7 0.3 - 1.0 mg/dL    GFR Estimate 70 >60 mL/min/1.73m2   Lactic acid whole blood   Result Value Ref Range    Lactic Acid 1.4 0.7 - 2.0 mmol/L   Troponin I   Result Value Ref Range    Troponin I <2.4 0.0 - 34.0 pg/mL   Magnesium   Result Value Ref Range    Magnesium 1.8 (L) 1.9 - 2.7 mg/dL   TSH Reflex [facility]   Result Value Ref Range    TSH 0.73 0.40 - 4.00 mU/L   Nt probnp inpatient (BNP)   Result Value Ref Range    N terminal Pro BNP Inpatient 13 0 - 100 pg/mL   CRP inflammation   Result Value Ref Range    CRP Inflammation 2.3 <10.0 mg/L   Erythrocyte sedimentation rate auto   Result Value Ref Range    Erythrocyte Sedimentation Rate 6 0 - 15 mm/hr   UA  with Microscopic reflex to Culture    Specimen: Urine, Midstream   Result Value Ref Range    Color Urine Yellow Colorless, Straw, Light Yellow, Yellow    Appearance Urine Clear Clear    Glucose Urine >1000 (A) Negative mg/dL    Bilirubin Urine Negative Negative    Ketones Urine 10  (A) Negative mg/dL    Specific Gravity Urine 1.016 1.000 - 1.030    Blood Urine Negative Negative    pH Urine 5.5 5.0 - 9.0    Protein Albumin Urine Negative Negative mg/dL    Urobilinogen Urine Normal Normal, 2.0 mg/dL    Nitrite Urine Negative Negative    Leukocyte Esterase Urine Negative Negative    RBC Urine <1 <=2 /HPF    WBC Urine 0 <=5 /HPF    Narrative    Urine Culture not indicated   CBC with platelets and differential   Result Value Ref Range    WBC Count 9.5 4.0 - 11.0 10e3/uL    RBC Count 5.42 4.40 - 5.90 10e6/uL    Hemoglobin 16.5 13.3 - 17.7 g/dL    Hematocrit 46.6 40.0 - 53.0 %    MCV 86 78 - 100 fL    MCH 30.4 26.5 - 33.0 pg    MCHC 35.4 31.5 - 36.5 g/dL    RDW 11.6 10.0 - 15.0 %    Platelet Count 289 150 - 450 10e3/uL    % Neutrophils 69 %    % Lymphocytes 23 %    % Monocytes 7 %    % Eosinophils 1 %    % Basophils 0 %    % Immature Granulocytes 0 %    NRBCs per 100 WBC 0 <1 /100    Absolute Neutrophils 6.6 1.6 - 8.3 10e3/uL    Absolute Lymphocytes 2.2 0.8 - 5.3 10e3/uL    Absolute Monocytes 0.7 0.0 - 1.3 10e3/uL    Absolute Eosinophils 0.1 0.0 - 0.7 10e3/uL    Absolute Basophils 0.0 0.0 - 0.2 10e3/uL    Absolute Immature Granulocytes 0.0 <=0.4 10e3/uL    Absolute NRBCs 0.0 10e3/uL   Ketone Beta-Hydroxybutyrate Quantitative   Result Value Ref Range    Ketone (Beta-Hydroxybutyrate) Quantitative 0.4 0.0 - 0.6 mmol/L   Glucose by meter   Result Value Ref Range    GLUCOSE BY METER POCT 374 (H) 70 - 99 mg/dL   CT Head w/o Contrast    Narrative    PROCEDURE: CT HEAD W/O CONTRAST     HISTORY: Dizziness, non-specific.    COMPARISON: None.    TECHNIQUE:  Helical images of the head from the foramen magnum to the  vertex were  obtained without contrast.    FINDINGS: The ventricles and sulci are normal in volume. No acute  intracranial hemorrhage, mass effect, midline shift, hydrocephalus or  basilar cystern effacement are present.    The grey-white matter interface is preserved.    The calvarium is intact. The mastoid air cells are clear.  The  visualized paranasal sinuses are clear.      Impression    IMPRESSION: Normal brain      ARIES ALBA MD         SYSTEM ID:  [id number]   CTA Head Neck with Contrast    Narrative    PROCEDURE: CTA  HEAD NECK WITH CONTRAST 4/14/2022 4:26 PM    HISTORY: Dizziness, non-specific    COMPARISONS: None.    Meds/Dose Given: 100 mL Isovue 370    TECHNIQUE: CT angiogram of the neck and CT angiogram of the brain with  sagittal and coronal MIPS reconstructions    FINDINGS: The brachiocephalic artery is widely patent. Right  subclavian and right vertebral arteries are normal. The right common  carotid right carotid bifurcation and right internal carotid arteries  are normal. The left common carotid left carotid bifurcation and left  internal carotid arteries are normal. The left subclavian and left  vertebral arteries are normal.     CT angiogram of the brain: The distal vertebral basilar superior  cerebellar and posterior cerebral arteries are normal bilaterally. The  carotid siphons supraclinoid internal carotid anterior and middle  cerebral arteries appear normal. No intracranial stenoses or  occlusions are noted. There are no intracranial aneurysms.    Muscles of mastication and the parapharyngeal spaces are normal. The  salivary glands are normal. The larynx and upper trachea is normal.  The thyroid gland is normal. Cervical and upper mediastinal lymph  nodes are normal in caliber. The lung apices are clear.          Impression    IMPRESSION: No hemodynamically significant stenoses or occlusions are  identified in the arteries of the neck and brain    ARIES ALBA MD         SYSTEM ID:  [id number]    Glucose by meter   Result Value Ref Range    GLUCOSE BY METER POCT 260 (H) 70 - 99 mg/dL   Asymptomatic Influenza A/B & SARS-CoV2 (COVID-19) Virus PCR Multiplex Nasopharyngeal    Specimen: Nasopharyngeal; Swab   Result Value Ref Range    Influenza A PCR Negative Negative    Influenza B PCR Negative Negative    RSV PCR Negative Negative    SARS CoV2 PCR Negative Negative    Narrative    Testing was performed using the Xpert Xpress CoV2/Flu/RSV Assay on the [employer] GeneXpert Instrument. This test should be ordered for the detection of SARS-CoV-2 and influenza viruses in individuals who meet clinical and/or epidemiological criteria. Test performance is unknown in asymptomatic patients. This test is for in vitro diagnostic use under the FDA EUA for laboratories certified under CLIA to perform high or moderate complexity testing. This test has not been FDA cleared or approved. A negative result does not rule out the presence of PCR inhibitors in the specimen or target RNA in concentration below the limit of detection for the assay. If only one viral target is positive but coinfection with multiple targets is suspected, the sample should be re-tested with another FDA cleared, approved, or authorized test, if coinfection would change clinical management. This test was validated by the [facility] [employer]. These laboratories are certified under the Clinical  Laboratory Improvement Amendments of 1988 (CLIA-88) as qualified to perform high complexity laboratory testing.   Glucose by meter   Result Value Ref Range    GLUCOSE BY METER POCT 135 (H) 70 - 99 mg/dL       Medications   0.9% sodium chloride BOLUS (1,000 mLs Intravenous New Bag 4/14/22 1502)     Followed by   sodium chloride 0.9% infusion (has no administration in time range)   meclizine (ANTIVERT) tablet 25 mg (has no administration in time range)   pseudoePHEDrine (SUDAFED) tablet 30 mg (has no administration in time range)   methylPREDNISolone sodium  succinate (solu-MEDROL) injection 125 mg (has no administration in time range)       Assessments & Plan (with Medical Decision Making)     I have reviewed the nursing notes.    I have reviewed the findings, diagnosis, plan and need for follow up with the patient.      New Prescriptions    CEFUROXIME (CEFTIN) 500 MG TABLET    Take 1 tablet (500 mg) by mouth 2 times daily for 10 days    MECLIZINE (ANTIVERT) 12.5 MG TABLET    Take 4 tablets (50 mg) by mouth 3 times daily as needed for dizziness    PSEUDOEPHEDRINE (SUDAFED) 30 MG TABLET    Take 1 tablet (30 mg) by mouth every 4 hours as needed for congestion    SCOPOLAMINE (TRANSDERM) 1 MG/3DAYS 72 HR PATCH    Place 1 patch onto the skin every 72 hours for 5 doses       Final diagnoses:   Dizziness   OME (otitis media with effusion), right   Hypomagnesemia   Dehydration     Afebrile.  Vital signs stable.  Orthostatics unremarkable.  Patient with sudden onset of severe dizziness that started 2 days ago.  Some slight nausea.  Physical examination shows a negative [place].  Right TM is erythematic and injected when compared to the left.  Most likely OME.  IV was established and the patient was given fluids, Solu-Medrol, meclizine and Sudafed.   EKG shows normal sinus rhythm with a heart rate of 72.  Troponin is normal.  Blood glucose was checked and was greater than 374.  I ordered insulin but the patient apparently once he found out his blood glucose level gave himself 7 units of insulin.  CBC is normal.  BMP is normal.  Troponin is normal.  INR is normal.  CRP and ESR normal.  TSH is normal.  CMP returns with sodium slightly decreased at 132.  Patient's glucose is 361 but otherwise is unremarkable.  His UA shows greater than 1000 glucose and ketones.  No signs of hematuria or UTI.  Patient's beta hydroxybutyrate was checked and this is normal.  Given his persistent symptoms a CT of his head was performed and this shows no acute findings which is reassuring.  A CTA  "of his head and neck with contrast shows No hemodynamically significant stenoses or occlusions are identified in the arteries of the neck and brain.  This is reassuring.  Patient's dizziness continued and he was given additional meclizine, Sudafed as well as a scopolamine patch.  IV fluids continued.  Gradually with time the patient feels well enough to return home.  Besides he states \"all the alarms and whistles from the equipment is driving him crazy\".  Return if there is any concerns for further evaluation as needed.  Rx for Ceftin, meclizine, Sudafed and scopolamine patches.  I explained my diagnostic considerations and recommendations and the patient voiced an understanding and was in agreement with the treatment plan. All questions were answered to the best of my ability.  We discussed potential side effects of any prescribed or recommended therapies, as well as expectations for response to treatments.      .  4/14/2022   [facility], Declan MARTIN PA-C  04/14/22 1942    "

## 2022-04-14 NOTE — ED TRIAGE NOTES
ED Nursing Triage Note (General)   ________________________________    Malik Melendez is a 47 year old Male that presents to triage private car  With history of dizziness for the last two days, pt states that it feels like he is spinning. States that he has been walking into walls with how dizzy he has been.   /86   Pulse 78   Temp (!) 96.6  F (35.9  C)   Resp 16   Wt 86.2 kg (190 lb)   SpO2 99%   BMI 28.06 kg/m  t  Patient appears alert , in mild distress., and cooperative behavior.    GCS Total = 15  Airway: intact  Breathing noted as Normal  Circulation Normal  Skin:  Normal      PRE HOSPITAL PRIOR LIVING SITUATION Significant Other

## 2022-04-14 NOTE — TELEPHONE ENCOUNTER
Noted that patient is currently in the [facility] Emergency department.     Melba Dominguez RN .............. 4/14/2022  3:49 PM

## 2022-04-14 NOTE — TELEPHONE ENCOUNTER
Patient called to talk to Dr. Johnson's nurse. He has been really dizzy and is wondering if he should come in or go to the ER. Please advise.    Elisabeth Singh on 4/14/2022 at 12:48 PM

## 2022-04-15 NOTE — TELEPHONE ENCOUNTER
See result note.  3 attempts made to contact patient.  Letter was sent.  Jenny Dai LPN........................4/15/2022  9:14 AM

## 2022-04-18 LAB
ATRIAL RATE - MUSE: 72 BPM
DIASTOLIC BLOOD PRESSURE - MUSE: NORMAL MMHG
INTERPRETATION ECG - MUSE: NORMAL
P AXIS - MUSE: 49 DEGREES
PR INTERVAL - MUSE: 154 MS
QRS DURATION - MUSE: 86 MS
QT - MUSE: 376 MS
QTC - MUSE: 411 MS
R AXIS - MUSE: 61 DEGREES
SYSTOLIC BLOOD PRESSURE - MUSE: NORMAL MMHG
T AXIS - MUSE: 47 DEGREES
VENTRICULAR RATE- MUSE: 72 BPM

## 2022-04-19 LAB
BACTERIA BLD CULT: NO GROWTH
BACTERIA BLD CULT: NO GROWTH

## 2022-08-16 ENCOUNTER — NURSE TRIAGE (OUTPATIENT)
Dept: NURSING | Facility: CLINIC | Age: 47
End: 2022-08-16

## 2022-08-16 ENCOUNTER — HOSPITAL ENCOUNTER (EMERGENCY)
Facility: OTHER | Age: 47
Discharge: HOME | End: 2022-08-16
Attending: EMERGENCY MEDICINE | Admitting: EMERGENCY MEDICINE
Payer: MEDICAID

## 2022-08-16 VITALS
WEIGHT: 180 LBS | RESPIRATION RATE: 20 BRPM | SYSTOLIC BLOOD PRESSURE: 139 MMHG | BODY MASS INDEX: 26.66 KG/M2 | HEART RATE: 100 BPM | HEIGHT: 69 IN | DIASTOLIC BLOOD PRESSURE: 95 MMHG | TEMPERATURE: 97 F | OXYGEN SATURATION: 99 %

## 2022-08-16 DIAGNOSIS — Z20.822 SUSPECTED 2019 NOVEL CORONAVIRUS INFECTION: ICD-10-CM

## 2022-08-16 DIAGNOSIS — E10.69 TYPE 1 DIABETES MELLITUS WITH OTHER SPECIFIED COMPLICATION (H): ICD-10-CM

## 2022-08-16 DIAGNOSIS — Z20.822 EXPOSURE TO 2019 NOVEL CORONAVIRUS: ICD-10-CM

## 2022-08-16 LAB
FLUAV RNA SPEC QL NAA+PROBE: NEGATIVE
FLUBV RNA RESP QL NAA+PROBE: NEGATIVE
RSV RNA SPEC NAA+PROBE: NEGATIVE
SARS-COV-2 RNA RESP QL NAA+PROBE: POSITIVE

## 2022-08-16 PROCEDURE — 99283 EMERGENCY DEPT VISIT LOW MDM: CPT | Mod: CS | Performed by: EMERGENCY MEDICINE

## 2022-08-16 PROCEDURE — 87637 SARSCOV2&INF A&B&RSV AMP PRB: CPT | Performed by: EMERGENCY MEDICINE

## 2022-08-16 PROCEDURE — 87637 SARSCOV2&INF A&B&RSV AMP PRB: CPT | Performed by: PHYSICIAN ASSISTANT

## 2022-08-16 PROCEDURE — C9803 HOPD COVID-19 SPEC COLLECT: HCPCS | Performed by: EMERGENCY MEDICINE

## 2022-08-16 ASSESSMENT — ENCOUNTER SYMPTOMS
MUSCULOSKELETAL NEGATIVE: 1
CONSTITUTIONAL NEGATIVE: 1
HEMATOLOGIC/LYMPHATIC NEGATIVE: 1
VOMITING: 1
CARDIOVASCULAR NEGATIVE: 1
RESPIRATORY NEGATIVE: 1
SHORTNESS OF BREATH: 0
ENDOCRINE NEGATIVE: 1
PSYCHIATRIC NEGATIVE: 1
NEUROLOGICAL NEGATIVE: 1
EYES NEGATIVE: 1
FEVER: 0
ALLERGIC/IMMUNOLOGIC NEGATIVE: 1

## 2022-08-16 ASSESSMENT — ACTIVITIES OF DAILY LIVING (ADL): ADLS_ACUITY_SCORE: 33

## 2022-08-17 NOTE — ED TRIAGE NOTES
"Patient comes to ER from home with concerns he may have covid. He states he took an at-home test today which was positive. He is a diabetic and states his blood sugars have been higher than normal recently and thinks this could be related to having covid. Last  per patient, and he gave himself insulin for this. He also states he wants a \"covid prescription\" if he is positive. Patient manages his own insulin. VSS. No other concerns.      Triage Assessment     Row Name 08/16/22 [id number]       Triage Assessment (Adult)    Airway WDL WDL       Respiratory WDL    Respiratory WDL WDL       Skin Circulation/Temperature WDL    Skin Circulation/Temperature WDL WDL       Cardiac WDL    Cardiac WDL WDL       Peripheral/Neurovascular WDL    Peripheral Neurovascular WDL WDL       Cognitive/Neuro/Behavioral WDL    Cognitive/Neuro/Behavioral WDL WDL              "

## 2022-08-17 NOTE — ED PROVIDER NOTES
History     Chief Complaint   Patient presents with     Covid Concern     HPI  Malik Melendez is a 47 year old male who presents today with complaints of COVID.  Patient took a home COVID test that was positive.  Came to the emergency department for a recheck.  States he had some vomiting about two days ago that has since resolved. Patient otherwise has been at baseline state of health.  States has been compliant with insulin.  Denies any additional complaints.    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  Per history       Drug-induced mood disorder (H) 01/18/2008     Priority: Medium     Personality disorder (H) 01/18/2008     Priority: Medium     Formatting of this note might be different from the original.  Per history  Epic       Major depressive disorder, single episode 01/18/2008     Priority: Medium     Formatting of this note might be different from the original.  Per history  Epic          Past Medical History:    No past medical history on file.    Past Surgical History:    Past Surgical History:   Procedure Laterality Date     COLONOSCOPY N/A 3/22/2022    Procedure: COLONOSCOPY, WITH POLYPECTOMY;  Surgeon: Logan Renee MD;  Location:  OR       Family History:    Family History   Problem Relation Age of  "Onset     Colon Cancer Mother        Social History:  Marital Status:  Single [1]  Social History     Tobacco Use     Smoking status: Former Smoker     Types: Cigarettes     Smokeless tobacco: Current User   Vaping Use     Vaping Use: Never used   Substance Use Topics     Alcohol use: No     Drug use: Yes     Types: Marijuana        Medications:    ADDERALL XR 10 MG 24 hr capsule  atorvastatin (LIPITOR) 10 MG tablet  BD INSULIN SYRINGE U/F 31G X 5/16\" 1 ML miscellaneous  blood glucose (NO BRAND SPECIFIED) lancing device  blood glucose (NO BRAND SPECIFIED) test strip  blood glucose monitoring (Kahuna CONTOUR) test strip  Continuous Blood Gluc  (DEXCOM G6 ) DIANA  Continuous Blood Gluc Sensor (DEXCOM G6 SENSOR) MISC  Continuous Blood Gluc Transmit (DEXCOM G6 TRANSMITTER) MISC  DULoxetine (CYMBALTA) 20 MG capsule  insulin aspart (NOVOLOG PEN) 100 UNIT/ML pen  insulin aspart (NOVOLOG VIAL) 100 UNITS/ML vial  insulin pen needle (B-D U/F) 31G X 8 MM  LEVEMIR VIAL 100 UNIT/ML soln  lisinopril (ZESTRIL) 20 MG tablet  propranolol (INDERAL) 20 MG tablet  pseudoePHEDrine (SUDAFED) 30 MG tablet  syringe, disposable, 1 ML MISC  thin (NO BRAND SPECIFIED) lancets          Review of Systems   Constitutional: Negative.  Negative for fever.   HENT: Negative.    Eyes: Negative.    Respiratory: Negative.  Negative for shortness of breath.    Cardiovascular: Negative.    Gastrointestinal: Positive for vomiting.   Endocrine: Negative.    Genitourinary: Negative.    Musculoskeletal: Negative.    Allergic/Immunologic: Negative.    Neurological: Negative.    Hematological: Negative.    Psychiatric/Behavioral: Negative.        Physical Exam   BP: (!) 139/95  Pulse: 100  Temp: 97  F (36.1  C)  Resp: 20  Height: 175.3 cm (5' 9\")  Weight: 83.9 kg (185 lb)  SpO2: 99 %      Physical Exam  Constitutional:       General: He is not in acute distress.     Appearance: Normal appearance. He is normal weight. He is not toxic-appearing. "   HENT:      Head: Normocephalic and atraumatic.      Mouth/Throat:      Mouth: Mucous membranes are moist.   Eyes:      Conjunctiva/sclera: Conjunctivae normal.   Cardiovascular:      Rate and Rhythm: Normal rate and regular rhythm.   Pulmonary:      Effort: Pulmonary effort is normal.      Breath sounds: Normal breath sounds.   Abdominal:      General: There is no distension.      Tenderness: There is no right CVA tenderness or left CVA tenderness.   Musculoskeletal:         General: Normal range of motion.      Cervical back: Normal range of motion and neck supple. No rigidity.      Right lower leg: No edema.      Left lower leg: No edema.   Skin:     General: Skin is warm.      Capillary Refill: Capillary refill takes less than 2 seconds.   Neurological:      General: No focal deficit present.      Mental Status: He is alert.   Psychiatric:         Mood and Affect: Mood normal.         Behavior: Behavior normal.         Thought Content: Thought content normal.         Judgment: Judgment normal.         ED Course                 Procedures         Results for orders placed or performed during the hospital encounter of 08/16/22 (from the past 24 hour(s))   Symptomatic; Unknown Influenza A/B & SARS-CoV2 (COVID-19) Virus PCR Multiplex Nasopharyngeal    Specimen: Nasopharyngeal; Swab   Result Value Ref Range    Influenza A PCR Negative Negative    Influenza B PCR Negative Negative    RSV PCR Negative Negative    SARS CoV2 PCR Positive (A) Negative    Narrative    Testing was performed using the Xpert Xpress CoV2/Flu/RSV Assay on the [facility] GeneXpert Instrument. This test should be ordered for the detection of SARS-CoV-2 and influenza viruses in individuals who meet clinical and/or epidemiological criteria. Test performance is unknown in asymptomatic patients. This test is for in vitro diagnostic use under the FDA EUA for laboratories certified under CLIA to perform high or moderate complexity testing. This test has  not been FDA cleared or approved. A negative result does not rule out the presence of PCR inhibitors in the specimen or target RNA in concentration below the limit of detection for the assay. If only one viral target is positive but coinfection with multiple targets is suspected, the sample should be re-tested with another FDA cleared, approved, or authorized test, if coinfection would change clinical management. This test was validated by the [facility] These laboratories are certified under the Clinical  Laboratory Improvement Amendments of 1988 (CLIA-88) as qualified to perform high complexity laboratory testing.       Medications - No data to display    Assessments & Plan (with Medical Decision Making)     47-year-old female who presents today with complaints of COVID.  Patient had a positive COVID test at home.  COVID test repeated here and also positive.  Patient wanted to discuss treatment options.  Explained to patient that given vomiting and history of diabetes I would like to do some additional testing before prescribing any outpatient COVID treatment regimens.  Patient declines.  States that he is feeling well and wants to go home and watch symptoms.  Has a telemedicine appointment in the morning.  Patient understands and comprehends the risk of leaving at this time.  Will return if any new or additional symptoms worsen.  Recheck  BP in one week.    Due to the nature of this electronic medical record, laboratory results, imaging results, diagnosis, other information and medications reported above may not represent information available to me at the the time of my care and disposition. Medications reported above may have not been ordered by me.     Portions of the record may have been created with voice recognition software. Occasional wrong-word or 'sound-a- like' substitution may have occurred due to the inherent limitations of voice recognition software. Though the chart has been  reviewed, there may be inadvertent transcription errors. Read the chart carefully and recognize, using context, where substitutions have occurred.       New Prescriptions    No medications on file       Final diagnoses:   Type 1 diabetes mellitus with other specified complication (H)   Suspected 2019 novel coronavirus infection   Exposure to 2019 novel coronavirus       8/16/2022   [facility]     Foster Bedoya MD  08/17/22 [id number]

## 2022-08-17 NOTE — DISCHARGE INSTRUCTIONS
1) You may return at anytime to continue your care.   2) Follow up with your doctor in 12 to 24 hours.   3) Follow the aftercare instructions provided.   4

## 2022-08-17 NOTE — TELEPHONE ENCOUNTER
"Nurse Triage SBAR    Is this a 2nd Level Triage? NO    Situation: Patient calling with concerns of high blood sugar.    Consent: not needed    Background:   Type one diabetic, COVID positive   Assessment:   Patient calling in today regarding his blood sugars that are \"all over the place\" and has felt ran down for the past 3-4 days. Patient is covid positive today and states he feels like his heart was beating really fast earlier today and really dizzy.     -All three COVID vaccines- medurna    BS:  -483 mg/dL 645pm - 20units humalog  -566 mg/dL BS 333pm- 30 units  humalog  -340 mg/dL BS- 848pm     -Normal blood sugar - 220s mg/ dL    -Denies dry mouth, dark urine     -Symptoms started Saturday- COVID  Protocol Recommended Disposition:   Go to ED Now    Recommendation: Advised patient to Go to ED now . Reviewed concerning symptoms and when to call back.           Adebayo Griffin RN [facility] 8/16/2022 9:00 PM          Reason for Disposition    [1] Blood glucose > 240 mg/dL (13.3 mmol/L) AND [2] rapid breathing    Additional Information    Negative: Unconscious or difficult to awaken    Negative: Acting confused (e.g., disoriented, slurred speech)    Negative: Very weak (e.g., can't stand)    Negative: Sounds like a life-threatening emergency to the triager    Negative: [1] Vomiting AND [2] signs of dehydration (e.g., very dry mouth, lightheaded, dark urine)    Protocols used: DIABETES - HIGH BLOOD SUGAR-A-AH      "

## 2022-12-19 NOTE — LETTER
This letter is to remind you that you are due for your Annual exam on or after   Your last comprehensive visit was more than 12 months ago.    Please call the clinic at [phone] to schedule your appointment.    Thank you for choosing [facility] and [facility] for your health care needs.    Sincerely,    Refill ENOC  [facility];

## 2022-12-19 NOTE — LETTER
December 20, 2022      Malik Melendez  [address]   [place] [address]        Dear Malik,     This letter is to remind you that you are due for your Annual exam on or after 2/18/22. As Dr. Vianey Johnson is no longer at [facility], it will be necessary for you to establish care with a new provider. This visit will be necessary to continue refills on medications and supplies for the upcoming year.      Please call the clinic at [phone] to schedule your appointment.    Thank you for choosing [facility] for your health care needs.      Sincerely,      Jena HODGSON RN  Clinic Care Team

## 2022-12-20 NOTE — TELEPHONE ENCOUNTER
"[id number] [place] sent Rx request for the following:      Requested Prescriptions   Pending Prescriptions Disp Refills     BD VEO INSULIN SYRINGE U/F 31G X 15/64\" 0.5 ML [Pharmacy Med Name: BD INS SYR 0.5/31G/6MM MIS] 500 each 11     Sig: USE  SYRINGE FIVE TIMES DAILY AS  DIRECTED       Diabetic Supplies Protocol Failed - 12/20/2022  3:23 PM        Failed - Recent (6 mo) or future (30 days) visit within the authorizing provider's specialty     Patient had office visit in the last 6 months or has a visit in the next 30 days with authorizing provider.  See \"Patient Info\" tab in inbasket, or \"Choose Columns\" in Meds & Orders section of the refill encounter.        Last Prescription Date:   2/1822  Last Fill Qty/Refills:         300, R-3    Last Office Visit:              2/1822   Future Office visit:           None    No provider currently here. Unable to reach, left a message on mobile number, home number out of order, significant other phone is no longer in service. Letter sent that he is in need of an appointment. Briana Ken RN on 12/20/2022 at 3:59 PM      "

## 2022-12-24 NOTE — ED NOTES
Pt is trying to find a ride home. Is calling a taxi for himself.    Please follow-up with PA in one week via telehealth appt.   Follow-up with Dr. Gaona in two weeks to review pathology report and for post-op check    May walk and climb stairs as often as youd like, no vigorous activity, do not lift anything greater than 10lbs, nothing per vagina x 6 weeks, do not drive while on pain medication.

## 2022-12-24 NOTE — DISCHARGE INSTRUCTIONS
1) Follow the aftercare instructions provided  2) IF at any point you develop any suicidal homicidal thoughts or concerns, return to the ER immediately  3) Follow-up with your doctor and psychiatrist next week as instructed  4) Your blood pressure today was elevated. You must have it rechecked by your primary care doctor within one week.       Aftercare Plan  If I am feeling unsafe or I am in a crisis, I will:   Contact my established care providers   Call the National Suicide Prevention Lifeline: 988  Go to the nearest emergency room   Call 911     Warning signs that I or other people might notice when a crisis is developing for me: I am irritable. I have trouble sleeping. I have thoughts of self-harm.     Things I am able to do on my own to cope or help me feel better: Take a relaxing shower or bath. Practice relaxation breathing. Take a walk.      Things that I am able to do with others to cope or help me better: Call a friend. Talk with Celia.      Things I can use or do for distraction: Clean house. Bake. Watch TV.      Changes I can make to support my mental health and wellness: Take my medication as prescribed.  Work on building a sober lifestyle.  Keep a regular sleep schedule.     People in my life that I can ask for help: Celia. My family.      Your [facility] has a mental health crisis team you can call 24/7: [facility] Crisis Response Team at [phone].    Other things that are important when I'm in crisis: Reach out and ask for help when needed. Take a break or time out when under stress.      Additional resources and information:     [facility], a short term Crisis Stabilization facility.  [address] [place], [place] [address]  Phone: [phone]  Mobile: [phone]    [facility]  Admissions are taken 24 hours a day, 7 days a week, 365 days a year. For more information, call [phone] or [phone].    [facility]  "[facility]. [address]. [place] [address]  [phone]  EMERGENCY 24-HOUR CRISIS LINE:  [phone]    Latanya Psychiatric-Mental Health Nurse Practitioner  [facility]  Outpatient Therapy & Psychiatry Office  Outpatient Substance Use Services  [address] [place] [address]  [phone]      Crisis Lines  Crisis Text Line: Text [place] to 139197. You will be connected with a trained live crisis counselor to provide support.    Community Resources  Fast Tracker  Linking people to mental health and substance use disorder resources  TruQC.IQMS     [place] Mental Health Warm Line  Peer to peer support  Monday thru Saturday, 12 pm to 10 pm  [phone] or 4.758.286.8535  Text \"Support\" to 97039    National [place] [employer] Mental Illness (DEBORAH)  080.348.1203 or 1.888.DEBORAH.HELPS      Mental Health Apps  My3  https://Bitbond.org/    VirtualHopeBox  https://[employer]/apps/virtual-hope-box/      Additional Information  Today you were seen by a licensed mental health professional through Triage and Transition services, [facility] ([facility])  for a crisis assessment in the Emergency Department at [facility].  It is recommended that you follow up with your established providers (psychiatrist, mental health therapist, and/or primary care doctor - as relevant) as soon as possible. Coordinators from [facility] will be calling you in the next 24-48 hours to ensure that you have the resources you need.  You can also contact [facility] coordinators directly at [phone]. You may have been scheduled for or offered an appointment with a mental health provider. [facility] maintains an extensive network of licensed behavioral health providers to connect patients with the services they need.  We do not charge providers a fee to participate in our referral network.  We match patients with providers based on a patient's specific needs, insurance coverage, and " location.  Our first effort will be to refer you to a provider within your care system, and will utilize providers outside your care system as needed.

## 2022-12-24 NOTE — CONSULTS
Diagnostic Evaluation Consultation  Crisis Assessment    Patient was assessed: Seferino  Patient location: [facility] ED  Was a release of information signed: No. Reason: patient declined.       Referral Data and Chief Complaint  Malik Melendez is a 47 year old, who uses he/him pronouns, and presents to the ED via EMS. Patient is referred to the ED by self. Patient is presenting to the ED for the following concerns: fall, loss of consciousness, and request for mental health assessment.      Informed Consent and Assessment Methods     Patient is his own decision maker.  Writer met with patient and explained the crisis assessment process, including applicable information disclosures and limits to confidentiality, assessed understanding of the process, and obtained consent to proceed with the assessment. Patient was observed to be able to participate in the assessment as evidenced by alert, eye contact, and active engagement. Assessment methods included conducting a formal interview with patient, review of medical records, collaboration with medical staff, and obtaining relevant collateral information from family and community providers when available..     Over the course of this crisis assessment offered validation, engaged patient in problem solving and disposition planning, worked with patient on safety and aftercare planning and provided psychoeducation. Patient's response to interventions was calm, polite, and active participation.        Summary of Patient Situation  Patient arrived to the ED via EMS from home where he had fallen with loss of consciousness.  He was medically cleared in the ED and requested a mental health assessment.  Patient stated that his medication, Celexa, isn't working and he feels depressed.  Patient also stated that he wasn't able to pay for refills.  He does not have a therapist.  Patient denied thoughts of harm to self, others, or property.  He denied hallucinations.  Patient reports a  "history of substance use disorders including heavy drinking until 2008, followed by excessive cocaine use which he no longer uses, and currently using marijuana daily.  Patient alluded to having other issues he wants to work on in therapy.         Brief Psychosocial History  Patient was born in [place] and raised in the [place].  He attended [employer] where he graduated high school. 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  He is currently unemployed. Patient does not receive Social Security.  Patient lives with his girlfriend of 13 years, Celia.  He has no children.  Patient enjoys watching TV, cleaning and baking. He identified his personal strength as being \"brutally honest\".     Significant Clinical History  Previous diagnoses include ADHD, MDD, ABDON, PTSD, and Cannabis Use Disorder. He quit drinking in 2008 and quit cocaine sometime later. Patient had BRET treatment twice. He received a DWI in 2010.  Patient reports one suicide attempt as a teen by injecting bleach.  He denied subsequent self-harm or suicide attempt.  There is no family history of suicide.      Collateral Information  Writer attempted to call patient's girlfriend, Celia Hoff, [phone].  An automated message stated that the party is not accepting calls at this time.  There was no option to leave a voicemail message.      Risk Assessment  [place] Suicide Severity Rating Scale Full Clinical Version: 12/24/2023  Suicidal Ideation  1. Wish to be Dead (Lifetime): Yes  Wish to be Dead Description (Lifetime): Yes  1. Wish to be Dead (Past 1 Month): Yes  Wish to be Dead Description (Past 1 Month): No  2. Non-Specific Active Suicidal Thoughts (Lifetime): No  Intensity of Ideation  Most Severe Ideation Rating (Lifetime): 5  Description of Most Severe Ideation (Lifetime): When drinking years ago.  Most Severe Ideation Rating (Past 1 Month): 1  Description of Most Severe Ideation (Past 1 Month): " N/A  Frequency (Lifetime): 2-5 times in week  Frequency (Past 1 Month): Less than once a week  Duration (Lifetime): Less than 1 hour/some of the time  Duration (Past 1 Month): Less than 1 hour/some of the time  Controllability (Lifetime): Easily able to control thoughts  Controllability (Past 1 Month): Easily able to control thoughts  Deterrents (Lifetime): Uncertain that deterrents stopped you  Deterrents (Past 1 Month): Uncertain that deterrents stopped you  Reasons for Ideation (Lifetime): Mostly to end or stop the pain (You couldn't go on living with the pain or how you were feeling)  Reasons for Ideation (Past 1 Month): Completely to get attention, revenge, or a reaction from others  Suicidal Behavior  Actual Attempt (Lifetime): Yes  Total Number of Actual Attempts (Lifetime): 1  Actual Attempt Description (Lifetime): Injected bleach as a teen.  Actual Attempt (Past 3 Months): No  Has subject engaged in non-suicidal self-injurious behavior? (Lifetime): No  Interrupted Attempts (Lifetime): No  Aborted or Self-Interrupted Attempt (Lifetime): No  Preparatory Acts or Behavior (Lifetime): No  C-SSRS Risk (Lifetime/Recent)  Calculated C-SSRS Risk Score (Lifetime/Recent): Moderate Risk     Actual/Potential Lethality (Most Lethal Attempt)  Most Lethal Attempt Date: 01/01/90  Actual Lethality/Medical Damage Code (Most Lethal Attempt): No physical damage or very minor physical damage  Potential Lethality Code (Most Lethal Attempt): Behavior not likely to result in injury       Validity of evaluation is not impacted by presenting factors during interview.   Comments regarding subjective versus objective responses to [place] tool: Patient denies suicidal ideation.   Environmental or Psychosocial Events: other life stressors  Chronic Risk Factors: chronic health problems   Warning Signs: increasing substance use or abuse  Protective Factors: intact marriage or domestic partnership, lives in a responsibly safe and stable  environment, help seeking and cultural, spiritual , or Samaritan beliefs associated with meaning and value in life  Interpretation of Risk Scoring, Risk Mitigation Interventions and Safety Plan: Moderate Risk     Does the patient have thoughts of harming others? No     Is the patient engaging in sexually inappropriate behavior?  no        Current Substance Abuse     Is there recent substance abuse? Patient stated that he uses marijuana daily.      Was a urine drug screen or blood alcohol level obtained: No       Mental Status Exam     Affect: Appropriate   Appearance: Appropriate    Attention Span/Concentration: Attentive  Eye Contact: Engaged   Fund of Knowledge: Appropriate    Language /Speech Content: Fluent   Language /Speech Volume: Normal    Language /Speech Rate/Productions: Normal    Recent Memory: Intact   Remote Memory: Intact   Mood: Normal    Orientation to Person: Yes    Orientation to Place: Yes   Orientation to Time of Day: Yes    Orientation to Date: Yes    Situation (Do they understand why they are here?): Yes    Psychomotor Behavior: Normal    Thought Content: Clear   Thought Form: Goal Directed and Intact      History of commitment: In 2008, patient was under commitment as Chemically Dependent only by [facility].       Medication    Psychotropic medications: Celexa  Medication changes made in the last two weeks: No       Current Care Team    Primary Care Provider: Josué Puentes M.D., [facility], [phone].   Psychiatrist: Guilherme Schaefer [facility], [place].    Therapist: No  : No     CTSS or ARMHS: No  ACT Team: No  Other: No      Diagnosis  Major depressive disorder, Recurrent episode, Moderate F33.1      Generalized anxiety disorder F41.1      Cannabis use disorder, Severe F12.20      Clinical Summary and Substantiation of Recommendations    Patient with a history of MDD, ABDON, PTSD, and Cannabis Use Disorder severe wants to address mental health issues and  improve his life.  He identified substance abuse and depression as current problems.  Patient is taking Celexa but doesn't think it's helping.  He is not seeing a therapist. Patient was able to completed chemical dependency treatment twice and quit using alcohol and cocaine.  However, he continues to use marijuana every day.  Patient alluded to other issues he wants to address but declined to share them during this assessment. Writer attempted to schedule a teletherapy appointment, however patient does not have access to the necessary technology or email account.  He was given a safety plan as well as resources for [facility].  Patient was discharged to home with a plan to call [facility] office for help with appointment scheduling as needed. Patient denies thoughts of harm to self or others.     Disposition    Recommended disposition: Individual Therapy and Medication Management       Reviewed case and recommendations with attending provider. Attending Name: Dr. Bedoya       Attending concurs with disposition: Yes       Patient concurs with disposition: Yes       Guardian concurs with disposition: NA      Final disposition: Individual therapy  and Medication management.     Outpatient Details (if applicable):   Aftercare plan and appointments placed in the AVS and provided to patient: Yes. Given to patient by ED staff.    Was lethal means counseling provided as a part of aftercare planning? No;       Assessment Details    Patient interview started at: 0432 and completed at: 0539.     Total duration spent on the patient case in minutes: 1.0 hrs      CPT code(s) utilized: 48157 - Psychotherapy for Crisis - 60 (30-74*) min       Nini Gonzalez MS, LP, Licensed Mental Health Professional  [facility] - Triage & Transition Services  Callback: [phone]      Aftercare Plan  If I am feeling unsafe or I am in a crisis, I will:   Contact my established care providers   Call the National Suicide  Prevention Lifeline: 988  Go to the nearest emergency room   Call 911     Warning signs that I or other people might notice when a crisis is developing for me: I am irritable. I have trouble sleeping. I have thoughts of self-harm.     Things I am able to do on my own to cope or help me feel better: Take a relaxing shower or bath. Practice relaxation breathing. Take a walk.      Things that I am able to do with others to cope or help me better: Call a friend. Talk with Celia.      Things I can use or do for distraction: Clean house. Bake. Watch TV.      Changes I can make to support my mental health and wellness: Take my medication as prescribed.  Work on building a sober lifestyle.  Keep a regular sleep schedule.     People in my life that I can ask for help: Celia. My family.      Your [facility] has a mental health crisis team you can call 24/7: [facility] Crisis Response Team at [phone].    Other things that are important when I'm in crisis: Reach out and ask for help when needed. Take a break or time out when under stress.      Additional resources and information:     [facility], a short term Crisis Stabilization facility.  [address], [place] [address]  Phone: [phone]  Mobile: [phone]    [facility]  Admissions are taken 24 hours a day, 7 days a week, 365 days a year. For more information, call [phone] or [phone].    [facility] [address] [place] [address]  [phone]  EMERGENCY 24-HOUR CRISIS LINE:  [phone]    Latanya Psychiatric-Mental Health Nurse Practitioner  [facility]  Outpatient Therapy & Psychiatry Office  Outpatient Substance Use Services  [address]  [place] [address]  [phone]      Crisis Lines  Crisis Text Line: Text [place] to 883292. You will be connected with a trained live crisis counselor to provide  "support.    Community Resources  Fast Tracker  Linking people to mental health and substance use disorder resources  [place].org     [place] Mental Health Warm Line  Peer to peer support  Monday thru Saturday, 12 pm to 10 pm  [phone] or 1.120.757.7216  Text \"Support\" to 49876    National [place] on Mental Illness (DEBORAH)  026.616.2892 or 1.888.DEBORAH.HELPS      Mental Health Apps  My3  https://Palringo.UpDown/    VirtualHopeBox  https://[employer]/apps/virtual-hope-box/      Additional Information  Today you were seen by a licensed mental health professional through Triage and Transition services, [facility] ([facility])  for a crisis assessment in the Emergency Department at [facility].  It is recommended that you follow up with your established providers (psychiatrist, mental health therapist, and/or primary care doctor - as relevant) as soon as possible. Coordinators from [facility] will be calling you in the next 24-48 hours to ensure that you have the resources you need.  You can also contact [facility] coordinators directly at [phone]. You may have been scheduled for or offered an appointment with a mental health provider. [facility] maintains an extensive network of licensed behavioral health providers to connect patients with the services they need.  We do not charge providers a fee to participate in our referral network.  We match patients with providers based on a patient's specific needs, insurance coverage, and location.  Our first effort will be to refer you to a provider within your care system, and will utilize providers outside your care system as needed.      "

## 2022-12-24 NOTE — ED TRIAGE NOTES
Pt comes into the ER today from home via ambulance. Pt reports he has been vomiting this evening after eating pizza. He has fallen at least twice, hitting head, with positive loss of consciousness. Unsure how long he was out for. Some abdominal discomfort with palpation. Headache. Pt also reports that he is very depressed and he wants mental health treatment today. He denies SI, HI.   He hasn't been taking his medication due to lack of funds to pay for it.   Type one diabetic.   Smokes marijuana daily.   Pt arrives with a c-collar on, neurologically intact.      Triage Assessment     Row Name 12/24/22 0102       Triage Assessment (Adult)    Airway WDL WDL       Respiratory WDL    Respiratory WDL WDL       Skin Circulation/Temperature WDL    Skin Circulation/Temperature WDL WDL       Cardiac WDL    Cardiac WDL WDL       Peripheral/Neurovascular WDL    Peripheral Neurovascular WDL WDL       Cognitive/Neuro/Behavioral WDL    Cognitive/Neuro/Behavioral WDL WDL       [place] Coma Scale    Best Eye Response 4-->(E4) spontaneous    Best Motor Response 6-->(M6) obeys commands    Best Verbal Response 5-->(V5) oriented    [place] Coma Scale Score 15

## 2022-12-24 NOTE — ED PROVIDER NOTES
History     Chief Complaint   Patient presents with     Nausea & Vomiting     Fall     HPI  Malik Melendez is a 47 year old male who presents today with complaints of nausea and vomiting as well as fall in the bathroom.  Patient said he became dizzy while in the bathroom and fell to the floor.  Patient states he hit his head in the process.  Patient believes he had loss of consciousness.  Feels better at this time.  Denies any additional complaints.    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  Per history       Drug-induced mood disorder (H) 01/18/2008     Priority: Medium     Personality disorder (H) 01/18/2008     Priority: Medium     Formatting of this note might be different from the original.  Per history  Epic       Major depressive disorder, single episode 01/18/2008     Priority: Medium     Formatting of this note might be different from the original.  Per history  Epic          Past Medical History:    No past medical history on file.    Past Surgical History:    Past Surgical History:   Procedure Laterality Date     COLONOSCOPY N/A 3/22/2022    Procedure: COLONOSCOPY, WITH POLYPECTOMY;  Surgeon: Logan Renee MD;  Location:  OR       Family History:    Family History   Problem Relation Age of Onset      "Colon Cancer Mother        Social History:  Marital Status:  Single [1]  Social History     Tobacco Use     Smoking status: Former     Types: Cigarettes     Smokeless tobacco: Current   Vaping Use     Vaping Use: Never used   Substance Use Topics     Alcohol use: No     Drug use: Yes     Types: Marijuana        Medications:    ADDERALL XR 10 MG 24 hr capsule  atorvastatin (LIPITOR) 10 MG tablet  DULoxetine (CYMBALTA) 20 MG capsule  insulin aspart (NOVOLOG PEN) 100 UNIT/ML pen  LEVEMIR VIAL 100 UNIT/ML soln  lisinopril (ZESTRIL) 20 MG tablet  propranolol (INDERAL) 20 MG tablet  BD VEO INSULIN SYRINGE U/F 31G X 15/64\" 0.5 ML  blood glucose (NO BRAND SPECIFIED) lancing device  blood glucose (NO BRAND SPECIFIED) test strip  blood glucose monitoring ([employer] CONTOUR) test strip  Continuous Blood Gluc  (DEXCOM G6 ) DIANA  Continuous Blood Gluc Sensor (DEXCOM G6 SENSOR) MISC  Continuous Blood Gluc Transmit (DEXCOM G6 TRANSMITTER) MISC  insulin aspart (NOVOLOG VIAL) 100 UNITS/ML vial  insulin pen needle (B-D U/F) 31G X 8 MM  pseudoePHEDrine (SUDAFED) 30 MG tablet  syringe, disposable, 1 ML MISC  thin (NO BRAND SPECIFIED) lancets          Review of Systems   Constitutional: Negative.  Negative for chills.   HENT: Negative.    Eyes: Negative.    Respiratory: Negative.  Negative for shortness of breath.    Cardiovascular: Negative.    Gastrointestinal: Negative.    Genitourinary: Negative.    Musculoskeletal: Negative.  Negative for myalgias, neck pain and neck stiffness.   Skin: Negative.    Allergic/Immunologic: Negative.    Neurological: Negative.    Hematological: Negative.    Psychiatric/Behavioral: Negative.  Negative for self-injury, sleep disturbance and suicidal ideas. The patient is not nervous/anxious.        Physical Exam   BP: (!) 146/82  Pulse: 76  Temp: 97  F (36.1  C)  Resp: 18  Height: 175.3 cm (5' 9\")  Weight: 81.6 kg (180 lb)  SpO2: 97 %      Physical Exam  Constitutional:       General: He is " not in acute distress.     Appearance: Normal appearance. He is normal weight. He is not toxic-appearing.   HENT:      Head: Normocephalic.   Eyes:      Extraocular Movements: Extraocular movements intact.      Conjunctiva/sclera: Conjunctivae normal.   Cardiovascular:      Rate and Rhythm: Normal rate and regular rhythm.   Pulmonary:      Effort: Pulmonary effort is normal.   Musculoskeletal:         General: Normal range of motion.      Cervical back: Normal range of motion and neck supple.   Skin:     General: Skin is warm.      Capillary Refill: Capillary refill takes less than 2 seconds.   Neurological:      General: No focal deficit present.      Mental Status: He is alert.   Psychiatric:         Mood and Affect: Mood normal.         Behavior: Behavior normal.         ED Course       ED Course as of 12/24/22 0604   Sat Dec 24, 2022   0603 Spoke with and from diagnostic evaluation center.  Patient expresses need for psychiatric help but denies suicidal ideation.  Patient not felt to be requiring acute psychiatric hospitalization.  Patient is going to set be set up for outpatient therapy.  Will contract for safety.     Procedures           Results for orders placed or performed during the hospital encounter of 12/24/22 (from the past 24 hour(s))   Glucose by meter   Result Value Ref Range    GLUCOSE BY METER POCT 150 (H) 70 - 99 mg/dL   EKG 12 lead   Result Value Ref Range    Systolic Blood Pressure  mmHg    Diastolic Blood Pressure  mmHg    Ventricular Rate 73 BPM    Atrial Rate 73 BPM    WY Interval 156 ms    QRS Duration 92 ms     ms    QTc 442 ms    P Axis 41 degrees    R AXIS 37 degrees    T Axis 26 degrees    Interpretation ECG       Sinus rhythm  Nonspecific ST and T wave abnormality  Abnormal ECG  When compared with ECG of 14-APR-2022 14:51,  No significant change was found     CBC with platelets differential    Narrative    The following orders were created for panel order CBC with platelets  differential.  Procedure                               Abnormality         Status                     ---------                               -----------         ------                     CBC with platelets and d...[[id number]]                      Final result                 Please view results for these tests on the individual orders.   Basic metabolic panel   Result Value Ref Range    Sodium 133 (L) 136 - 145 mmol/L    Potassium 3.9 3.4 - 5.3 mmol/L    Chloride 96 (L) 98 - 107 mmol/L    Carbon Dioxide (CO2) 25 22 - 29 mmol/L    Anion Gap 12 7 - 15 mmol/L    Urea Nitrogen 21.2 (H) 6.0 - 20.0 mg/dL    Creatinine 0.94 0.67 - 1.17 mg/dL    Calcium 9.2 8.6 - 10.0 mg/dL    Glucose 107 (H) 70 - 99 mg/dL    GFR Estimate >90 >60 mL/min/1.73m2   CBC with platelets and differential   Result Value Ref Range    WBC Count 9.9 4.0 - 11.0 10e3/uL    RBC Count 5.06 4.40 - 5.90 10e6/uL    Hemoglobin 15.3 13.3 - 17.7 g/dL    Hematocrit 42.4 40.0 - 53.0 %    MCV 84 78 - 100 fL    MCH 30.2 26.5 - 33.0 pg    MCHC 36.1 31.5 - 36.5 g/dL    RDW 11.5 10.0 - 15.0 %    Platelet Count 271 150 - 450 10e3/uL    % Neutrophils 73 %    % Lymphocytes 21 %    % Monocytes 6 %    % Eosinophils 0 %    % Basophils 0 %    % Immature Granulocytes 0 %    NRBCs per 100 WBC 0 <1 /100    Absolute Neutrophils 7.2 1.6 - 8.3 10e3/uL    Absolute Lymphocytes 2.0 0.8 - 5.3 10e3/uL    Absolute Monocytes 0.6 0.0 - 1.3 10e3/uL    Absolute Eosinophils 0.0 0.0 - 0.7 10e3/uL    Absolute Basophils 0.0 0.0 - 0.2 10e3/uL    Absolute Immature Granulocytes 0.0 <=0.4 10e3/uL    Absolute NRBCs 0.0 10e3/uL   Extra Tube    Narrative    The following orders were created for panel order Extra Tube.  Procedure                               Abnormality         Status                     ---------                               -----------         ------                     Extra Blue Top Tube[[id number]]                              Final result               Extra Red Top  Tube[[id number]]                               Final result               Extra Heparinized Syringe[[id number]]                        Final result                 Please view results for these tests on the individual orders.   Extra Blue Top Tube   Result Value Ref Range    Hold Specimen JIC    Extra Red Top Tube   Result Value Ref Range    Hold Specimen JIC    Extra Heparinized Syringe   Result Value Ref Range    Hold Specimen JIC    CT Head w/o Contrast    Narrative    PROCEDURE INFORMATION:   Exam: CT Head Without Contrast   Exam date and time: 12/24/2022 3:01 AM   Age: 47 years old   Clinical indication: Injury or trauma; Fall; Blunt trauma (contusions or   hematomas); With loss of consciousness; Not specified; Additional info:   47-year-old male who presents today with complaints of nausea and vomiting   status post head trauma. Rule out intracranial bleed     TECHNIQUE:   Imaging protocol: Computed tomography of the head without contrast.   Radiation optimization: All CT scans at this facility use at least one of these   dose optimization techniques: automated exposure control; mA and/or kV   adjustment per patient size (includes targeted exams where dose is matched to   clinical indication); or iterative reconstruction.     COMPARISON:   CT HEAD W/O CONTRAST 4/14/2022 3:52 PM     FINDINGS:   Brain: No intracranial hemorrhage, mass effect or midline shift.    Cerebral ventricles: No ventriculomegaly.   Paranasal sinuses:  Visualized sinuses are clear.   Mastoid air cells: Visualized mastoid air cells are well aerated.     Bones/joints: No acute or destructive abnormality.   Soft tissues: No acute process.       Impression    IMPRESSION:   No CT signs of acute intracranial process.     THIS DOCUMENT HAS BEEN ELECTRONICALLY SIGNED BY PAUL BABCOCK MD   CT Cervical Spine w/o Contrast    Narrative    PROCEDURE INFORMATION:   Exam: CT Cervical Spine Without Contrast   Exam date and time: 12/24/2022 3:01 AM   Age:  47 years old   Clinical indication: Injury or trauma; Fall; Blunt trauma; Additional info:   47-year-old male who presents today with complaints of neck pain status post   head trauma. Rule out cervical spine fracture.     TECHNIQUE:   Imaging protocol: Computed tomography of the cervical spine without contrast.   Radiation optimization: All CT scans at this facility use at least one of these   dose optimization techniques: automated exposure control; mA and/or kV   adjustment per patient size (includes targeted exams where dose is matched to   clinical indication); or iterative reconstruction.     COMPARISON:   CTA HEAD NECK WITH CONTRAST 4/14/2022 3:34 PM     FINDINGS:   Limitations: Limitation: CT has limited sensitivity for cord pathology and disc   protrusions. If there are myelopathic/radiculopathic signs/symptoms, consider   MRI. Noncontrasted examination has limited sensitivity for vascular pathology   in neoplasm in particular.      Bones/joints: No acute fracture.   Discs/Spinal canal/Neural foramina: Normal posterior alignment.   Disc Details::    Vertebral body heights and intervertebral disc spaces are maintained.    Lungs: Lung apices negative for infiltrate and pneumothorax.   Soft tissues: No acute finding.         Impression    IMPRESSION:   No cervical spine fracture.     THIS DOCUMENT HAS BEEN ELECTRONICALLY SIGNED BY PAUL BABCOCK MD       Medications - No data to display    Assessments & Plan (with Medical Decision Making)     47-year-old male who presents today with complaints of fall in the bathroom.  Patient states he felt dizzy and and fell backwards.  There was reportedly loss of consciousness.  Patient has no physical signs of cranial trauma.  Had a work-up including CT of the head and neck as well as basic labs.  No acute cause for symptoms noted.  Patient also had an EKG showed normal sinus rhythm without ST elevation.      While in the emergency department as before discharge patient  stated that he would like to speak to a psychiatrist.  He stated that he has been depressed recently and will be wished to speak to a psychiatrist while he is here in emergency department.  Patient seen by diagnostic evaluation center whose assessment was that patient indeed needs some psychiatry help.  Patient however is not at risk for acute psychiatric decompensation patient and did not require acute psychiatric hospitalization at this time.  Patient denies any suicidal homicidal ideations.     Patient given resources to follow-up with as an outpatient.  Patient signing contract for safety.  Patient understands to return if he develops any new or worsening symptoms. Recheck bp in one week.     New Prescriptions    No medications on file       Final diagnoses:   Closed head injury, initial encounter       12/24/2022   [facility] AND [facility]     Foster Bedoya MD  12/25/22 0130

## 2022-12-30 NOTE — TELEPHONE ENCOUNTER
Patient scheduled 1/9/23 with Lissette Ndiaye for diabetes follow up    Inés Kim CMA on 12/30/2022 at 11:38 AM

## 2023-01-01 ENCOUNTER — OFFICE VISIT (OUTPATIENT)
Dept: SURGERY | Facility: OTHER | Age: 48
End: 2023-01-01
Attending: SURGERY
Payer: MEDICAID

## 2023-01-01 ENCOUNTER — TELEPHONE (OUTPATIENT)
Dept: FAMILY MEDICINE | Facility: OTHER | Age: 48
End: 2023-01-01
Payer: MEDICAID

## 2023-01-01 ENCOUNTER — ALLIED HEALTH/NURSE VISIT (OUTPATIENT)
Dept: EDUCATION SERVICES | Facility: OTHER | Age: 48
End: 2023-01-01
Attending: PHYSICIAN ASSISTANT
Payer: MEDICAID

## 2023-01-01 ENCOUNTER — ALLIED HEALTH/NURSE VISIT (OUTPATIENT)
Dept: EDUCATION SERVICES | Facility: OTHER | Age: 48
End: 2023-01-01
Attending: ORTHOPAEDIC SURGERY
Payer: MEDICAID

## 2023-01-01 ENCOUNTER — TELEPHONE (OUTPATIENT)
Dept: FAMILY MEDICINE | Facility: OTHER | Age: 48
End: 2023-01-01

## 2023-01-01 ENCOUNTER — LAB (OUTPATIENT)
Dept: LAB | Facility: OTHER | Age: 48
End: 2023-01-01
Attending: PHYSICIAN ASSISTANT
Payer: MEDICAID

## 2023-01-01 ENCOUNTER — OFFICE VISIT (OUTPATIENT)
Dept: FAMILY MEDICINE | Facility: OTHER | Age: 48
End: 2023-01-01
Attending: PHYSICIAN ASSISTANT
Payer: MEDICAID

## 2023-01-01 ENCOUNTER — TRANSFERRED RECORDS (OUTPATIENT)
Dept: MULTI SPECIALTY CLINIC | Facility: CLINIC | Age: 48
End: 2023-01-01

## 2023-01-01 VITALS
HEART RATE: 83 BPM | BODY MASS INDEX: 30.33 KG/M2 | OXYGEN SATURATION: 98 % | SYSTOLIC BLOOD PRESSURE: 150 MMHG | TEMPERATURE: 98.7 F | DIASTOLIC BLOOD PRESSURE: 80 MMHG | WEIGHT: 205.4 LBS

## 2023-01-01 VITALS
DIASTOLIC BLOOD PRESSURE: 76 MMHG | TEMPERATURE: 97.9 F | HEART RATE: 71 BPM | SYSTOLIC BLOOD PRESSURE: 124 MMHG | WEIGHT: 198.2 LBS | OXYGEN SATURATION: 99 % | RESPIRATION RATE: 18 BRPM | BODY MASS INDEX: 29.27 KG/M2

## 2023-01-01 VITALS
HEIGHT: 69 IN | BODY MASS INDEX: 28.88 KG/M2 | HEART RATE: 78 BPM | OXYGEN SATURATION: 97 % | WEIGHT: 195 LBS | RESPIRATION RATE: 20 BRPM | SYSTOLIC BLOOD PRESSURE: 138 MMHG | DIASTOLIC BLOOD PRESSURE: 80 MMHG | TEMPERATURE: 97.7 F

## 2023-01-01 VITALS
RESPIRATION RATE: 18 BRPM | SYSTOLIC BLOOD PRESSURE: 161 MMHG | HEART RATE: 82 BPM | TEMPERATURE: 97.5 F | BODY MASS INDEX: 28.94 KG/M2 | OXYGEN SATURATION: 99 % | DIASTOLIC BLOOD PRESSURE: 100 MMHG | WEIGHT: 196 LBS

## 2023-01-01 VITALS
DIASTOLIC BLOOD PRESSURE: 98 MMHG | TEMPERATURE: 98.6 F | RESPIRATION RATE: 17 BRPM | OXYGEN SATURATION: 99 % | SYSTOLIC BLOOD PRESSURE: 139 MMHG | BODY MASS INDEX: 30.33 KG/M2 | WEIGHT: 205.4 LBS | HEART RATE: 83 BPM

## 2023-01-01 DIAGNOSIS — D17.30 LIPOMA OF SKIN AND SUBCUTANEOUS TISSUE: ICD-10-CM

## 2023-01-01 DIAGNOSIS — E10.9 TYPE 1 DIABETES MELLITUS WITHOUT COMPLICATION (H): Primary | ICD-10-CM

## 2023-01-01 DIAGNOSIS — I10 BENIGN ESSENTIAL HYPERTENSION: Primary | ICD-10-CM

## 2023-01-01 DIAGNOSIS — I10 BENIGN ESSENTIAL HYPERTENSION: ICD-10-CM

## 2023-01-01 DIAGNOSIS — Z23 NEED FOR PNEUMOCOCCAL VACCINATION: ICD-10-CM

## 2023-01-01 DIAGNOSIS — E10.65 UNCONTROLLED TYPE 1 DIABETES MELLITUS WITH HYPERGLYCEMIA (H): ICD-10-CM

## 2023-01-01 DIAGNOSIS — E10.65 UNCONTROLLED TYPE 1 DIABETES MELLITUS WITH HYPERGLYCEMIA (H): Primary | ICD-10-CM

## 2023-01-01 DIAGNOSIS — E10.9 TYPE 1 DIABETES MELLITUS WITHOUT COMPLICATION (H): ICD-10-CM

## 2023-01-01 DIAGNOSIS — R80.9 PROTEINURIA, UNSPECIFIED TYPE: ICD-10-CM

## 2023-01-01 DIAGNOSIS — D17.9 LIPOMA, UNSPECIFIED SITE: Primary | ICD-10-CM

## 2023-01-01 DIAGNOSIS — E10.65 TYPE 1 DIABETES MELLITUS WITH HYPERGLYCEMIA (H): ICD-10-CM

## 2023-01-01 LAB
ALBUMIN SERPL BCG-MCNC: 4.1 G/DL (ref 3.5–5.2)
ALBUMIN UR-MCNC: 70 MG/DL
ALBUMIN UR-MCNC: NEGATIVE MG/DL
ALP SERPL-CCNC: 67 U/L (ref 40–129)
ALT SERPL W P-5'-P-CCNC: 18 U/L (ref 10–50)
ANION GAP SERPL CALCULATED.3IONS-SCNC: 10 MMOL/L (ref 7–15)
ANION GAP SERPL CALCULATED.3IONS-SCNC: 6 MMOL/L (ref 7–15)
ANION GAP SERPL CALCULATED.3IONS-SCNC: 9 MMOL/L (ref 7–15)
APPEARANCE UR: CLEAR
APPEARANCE UR: CLEAR
AST SERPL W P-5'-P-CCNC: 21 U/L (ref 10–50)
BASOPHILS # BLD AUTO: 0 10E3/UL (ref 0–0.2)
BASOPHILS NFR BLD AUTO: 0 %
BILIRUB SERPL-MCNC: 0.4 MG/DL
BILIRUB UR QL STRIP: NEGATIVE
BILIRUB UR QL STRIP: NEGATIVE
BUN SERPL-MCNC: 18.9 MG/DL (ref 6–20)
BUN SERPL-MCNC: 19.8 MG/DL (ref 6–20)
BUN SERPL-MCNC: 20.7 MG/DL (ref 6–20)
CALCIUM SERPL-MCNC: 9.1 MG/DL (ref 8.6–10)
CALCIUM SERPL-MCNC: 9.2 MG/DL (ref 8.6–10)
CALCIUM SERPL-MCNC: 9.5 MG/DL (ref 8.6–10)
CHLORIDE SERPL-SCNC: 100 MMOL/L (ref 98–107)
CHLORIDE SERPL-SCNC: 101 MMOL/L (ref 98–107)
CHLORIDE SERPL-SCNC: 97 MMOL/L (ref 98–107)
CHOLEST SERPL-MCNC: 158 MG/DL
CHOLEST SERPL-MCNC: 280 MG/DL
COLOR UR AUTO: ABNORMAL
COLOR UR AUTO: YELLOW
CREAT SERPL-MCNC: 0.97 MG/DL (ref 0.67–1.17)
CREAT SERPL-MCNC: 1.06 MG/DL (ref 0.67–1.17)
CREAT SERPL-MCNC: 1.22 MG/DL (ref 0.67–1.17)
CREAT UR-MCNC: 36.5 MG/DL
CREAT UR-MCNC: 60.3 MG/DL
DEPRECATED HCO3 PLAS-SCNC: 27 MMOL/L (ref 22–29)
DEPRECATED HCO3 PLAS-SCNC: 29 MMOL/L (ref 22–29)
DEPRECATED HCO3 PLAS-SCNC: 30 MMOL/L (ref 22–29)
EOSINOPHIL # BLD AUTO: 0.1 10E3/UL (ref 0–0.7)
EOSINOPHIL NFR BLD AUTO: 1 %
ERYTHROCYTE [DISTWIDTH] IN BLOOD BY AUTOMATED COUNT: 11.6 % (ref 10–15)
GFR SERPL CREATININE-BSD FRML MDRD: 73 ML/MIN/1.73M2
GFR SERPL CREATININE-BSD FRML MDRD: 86 ML/MIN/1.73M2
GFR SERPL CREATININE-BSD FRML MDRD: >90 ML/MIN/1.73M2
GLUCOSE SERPL-MCNC: 148 MG/DL (ref 70–99)
GLUCOSE SERPL-MCNC: 234 MG/DL (ref 70–99)
GLUCOSE SERPL-MCNC: 262 MG/DL (ref 70–99)
GLUCOSE UR STRIP-MCNC: >1000 MG/DL
GLUCOSE UR STRIP-MCNC: >1000 MG/DL
HBA1C MFR BLD: 10.4 % (ref 4–6.2)
HBA1C MFR BLD: 9 % (ref 4–6.2)
HBA1C MFR BLD: 9.4 % (ref 4–6.2)
HCT VFR BLD AUTO: 40.2 % (ref 40–53)
HDLC SERPL-MCNC: 57 MG/DL
HDLC SERPL-MCNC: 68 MG/DL
HGB BLD-MCNC: 14.5 G/DL (ref 13.3–17.7)
HGB UR QL STRIP: NEGATIVE
HGB UR QL STRIP: NEGATIVE
HYALINE CASTS: 3 /LPF
IMM GRANULOCYTES # BLD: 0.1 10E3/UL
IMM GRANULOCYTES NFR BLD: 1 %
KETONES UR STRIP-MCNC: NEGATIVE MG/DL
KETONES UR STRIP-MCNC: NEGATIVE MG/DL
LDLC SERPL CALC-MCNC: 167 MG/DL
LDLC SERPL CALC-MCNC: 79 MG/DL
LEUKOCYTE ESTERASE UR QL STRIP: NEGATIVE
LEUKOCYTE ESTERASE UR QL STRIP: NEGATIVE
LYMPHOCYTES # BLD AUTO: 2.7 10E3/UL (ref 0.8–5.3)
LYMPHOCYTES NFR BLD AUTO: 28 %
MCH RBC QN AUTO: 30.3 PG (ref 26.5–33)
MCHC RBC AUTO-ENTMCNC: 36.1 G/DL (ref 31.5–36.5)
MCV RBC AUTO: 84 FL (ref 78–100)
MICROALBUMIN UR-MCNC: 538.8 MG/L
MICROALBUMIN UR-MCNC: 71.9 MG/L
MICROALBUMIN/CREAT UR: 196.99 MG/G CR (ref 0–17)
MICROALBUMIN/CREAT UR: 893.53 MG/G CR (ref 0–17)
MONOCYTES # BLD AUTO: 0.7 10E3/UL (ref 0–1.3)
MONOCYTES NFR BLD AUTO: 7 %
MUCOUS THREADS #/AREA URNS LPF: PRESENT /LPF
NEUTROPHILS # BLD AUTO: 5.9 10E3/UL (ref 1.6–8.3)
NEUTROPHILS NFR BLD AUTO: 63 %
NITRATE UR QL: NEGATIVE
NITRATE UR QL: NEGATIVE
NONHDLC SERPL-MCNC: 101 MG/DL
NONHDLC SERPL-MCNC: 212 MG/DL
NRBC # BLD AUTO: 0 10E3/UL
NRBC BLD AUTO-RTO: 0 /100
PATH REPORT.COMMENTS IMP SPEC: NORMAL
PATH REPORT.FINAL DX SPEC: NORMAL
PH UR STRIP: 5.5 [PH] (ref 5–9)
PH UR STRIP: 5.5 [PH] (ref 5–9)
PHOTO IMAGE: NORMAL
PLATELET # BLD AUTO: 257 10E3/UL (ref 150–450)
POTASSIUM SERPL-SCNC: 4 MMOL/L (ref 3.4–5.3)
POTASSIUM SERPL-SCNC: 4.4 MMOL/L (ref 3.4–5.3)
POTASSIUM SERPL-SCNC: 4.4 MMOL/L (ref 3.4–5.3)
PROT SERPL-MCNC: 6.5 G/DL (ref 6.4–8.3)
RBC # BLD AUTO: 4.79 10E6/UL (ref 4.4–5.9)
RBC URINE: <1 /HPF
RETINOPATHY: NORMAL
SODIUM SERPL-SCNC: 135 MMOL/L (ref 136–145)
SODIUM SERPL-SCNC: 136 MMOL/L (ref 136–145)
SODIUM SERPL-SCNC: 138 MMOL/L (ref 136–145)
SP GR UR STRIP: 1.01 (ref 1–1.03)
SP GR UR STRIP: 1.01 (ref 1–1.03)
TRIGL SERPL-MCNC: 111 MG/DL
TRIGL SERPL-MCNC: 225 MG/DL
UROBILINOGEN UR STRIP-MCNC: NORMAL MG/DL
UROBILINOGEN UR STRIP-MCNC: NORMAL MG/DL
WBC # BLD AUTO: 9.4 10E3/UL (ref 4–11)
WBC URINE: 0 /HPF

## 2023-01-01 PROCEDURE — 99213 OFFICE O/P EST LOW 20 MIN: CPT | Performed by: PHYSICIAN ASSISTANT

## 2023-01-01 PROCEDURE — G0108 DIAB MANAGE TRN  PER INDIV: HCPCS | Performed by: REGISTERED NURSE

## 2023-01-01 PROCEDURE — 81001 URINALYSIS AUTO W/SCOPE: CPT | Mod: ZL | Performed by: PHYSICIAN ASSISTANT

## 2023-01-01 PROCEDURE — G0463 HOSPITAL OUTPT CLINIC VISIT: HCPCS

## 2023-01-01 PROCEDURE — 80061 LIPID PANEL: CPT | Mod: ZL

## 2023-01-01 PROCEDURE — 24071 EXC ARM/ELBOW LES SC 3 CM/>: CPT | Mod: 51 | Performed by: SURGERY

## 2023-01-01 PROCEDURE — 80048 BASIC METABOLIC PNL TOTAL CA: CPT | Mod: ZL | Performed by: PHYSICIAN ASSISTANT

## 2023-01-01 PROCEDURE — 83036 HEMOGLOBIN GLYCOSYLATED A1C: CPT | Mod: ZL

## 2023-01-01 PROCEDURE — 24071 EXC ARM/ELBOW LES SC 3 CM/>: CPT | Performed by: SURGERY

## 2023-01-01 PROCEDURE — 22903 EXC ABD LES SC 3 CM/>: CPT | Performed by: SURGERY

## 2023-01-01 PROCEDURE — 85004 AUTOMATED DIFF WBC COUNT: CPT | Mod: ZL

## 2023-01-01 PROCEDURE — 36415 COLL VENOUS BLD VENIPUNCTURE: CPT | Mod: ZL | Performed by: PHYSICIAN ASSISTANT

## 2023-01-01 PROCEDURE — 83036 HEMOGLOBIN GLYCOSYLATED A1C: CPT | Mod: ZL | Performed by: PHYSICIAN ASSISTANT

## 2023-01-01 PROCEDURE — 90677 PCV20 VACCINE IM: CPT

## 2023-01-01 PROCEDURE — G0463 HOSPITAL OUTPT CLINIC VISIT: HCPCS | Mod: 25

## 2023-01-01 PROCEDURE — 88304 TISSUE EXAM BY PATHOLOGIST: CPT

## 2023-01-01 PROCEDURE — 99214 OFFICE O/P EST MOD 30 MIN: CPT | Performed by: PHYSICIAN ASSISTANT

## 2023-01-01 PROCEDURE — 36415 COLL VENOUS BLD VENIPUNCTURE: CPT | Mod: ZL

## 2023-01-01 PROCEDURE — 11403 EXC TR-EXT B9+MARG 2.1-3CM: CPT | Performed by: SURGERY

## 2023-01-01 PROCEDURE — 99207 PR FOOT EXAM NO CHARGE: CPT | Performed by: PHYSICIAN ASSISTANT

## 2023-01-01 PROCEDURE — 80053 COMPREHEN METABOLIC PANEL: CPT | Mod: ZL

## 2023-01-01 PROCEDURE — 82570 ASSAY OF URINE CREATININE: CPT | Mod: ZL

## 2023-01-01 PROCEDURE — 81003 URINALYSIS AUTO W/O SCOPE: CPT | Mod: ZL

## 2023-01-01 PROCEDURE — 82570 ASSAY OF URINE CREATININE: CPT | Mod: ZL | Performed by: PHYSICIAN ASSISTANT

## 2023-01-01 PROCEDURE — 80061 LIPID PANEL: CPT | Mod: ZL | Performed by: PHYSICIAN ASSISTANT

## 2023-01-01 RX ORDER — LISINOPRIL AND HYDROCHLOROTHIAZIDE 12.5; 2 MG/1; MG/1
1 TABLET ORAL DAILY
Qty: 90 TABLET | Refills: 1 | Status: SHIPPED | OUTPATIENT
Start: 2023-01-01 | End: 2023-01-01

## 2023-01-01 RX ORDER — PROCHLORPERAZINE 25 MG/1
SUPPOSITORY RECTAL
Qty: 1 EACH | Refills: 3 | Status: SHIPPED | OUTPATIENT
Start: 2023-01-01

## 2023-01-01 RX ORDER — LISINOPRIL 20 MG/1
20 TABLET ORAL DAILY
Qty: 90 TABLET | Refills: 3 | Status: SHIPPED | OUTPATIENT
Start: 2023-01-01 | End: 2023-01-01 | Stop reason: ALTCHOICE

## 2023-01-01 RX ORDER — INSULIN ASPART 100 [IU]/ML
INJECTION, SOLUTION INTRAVENOUS; SUBCUTANEOUS
COMMUNITY
End: 2023-01-01

## 2023-01-01 RX ORDER — HYDROCHLOROTHIAZIDE 25 MG/1
25 TABLET ORAL DAILY
Qty: 90 TABLET | Refills: 1 | Status: SHIPPED | OUTPATIENT
Start: 2023-01-01 | End: 2023-01-01

## 2023-01-01 RX ORDER — ATORVASTATIN CALCIUM 10 MG/1
10 TABLET, FILM COATED ORAL DAILY
Qty: 90 TABLET | Refills: 3 | Status: SHIPPED | OUTPATIENT
Start: 2023-01-01 | End: 2023-01-01

## 2023-01-01 RX ORDER — ATORVASTATIN CALCIUM 20 MG/1
20 TABLET, FILM COATED ORAL DAILY
Qty: 90 TABLET | Refills: 1 | Status: SHIPPED | OUTPATIENT
Start: 2023-01-01 | End: 2023-01-01

## 2023-01-01 RX ORDER — INSULIN ASPART 100 [IU]/ML
INJECTION, SOLUTION INTRAVENOUS; SUBCUTANEOUS
Qty: 30 ML | Refills: 3 | Status: SHIPPED | OUTPATIENT
Start: 2023-01-01

## 2023-01-01 RX ORDER — PROCHLORPERAZINE 25 MG/1
SUPPOSITORY RECTAL
Qty: 9 EACH | Refills: 3 | Status: SHIPPED | OUTPATIENT
Start: 2023-01-01

## 2023-01-01 RX ORDER — PROCHLORPERAZINE 25 MG/1
SUPPOSITORY RECTAL
Qty: 1 EACH | Refills: 0 | Status: SHIPPED | OUTPATIENT
Start: 2023-01-01 | End: 2023-01-01

## 2023-01-01 RX ORDER — INSULIN DETEMIR 100 [IU]/ML
26 INJECTION, SOLUTION SUBCUTANEOUS 2 TIMES DAILY
Qty: 40 ML | Refills: 3 | Status: SHIPPED | OUTPATIENT
Start: 2023-01-01 | End: 2023-01-01

## 2023-01-01 RX ORDER — PROCHLORPERAZINE 25 MG/1
SUPPOSITORY RECTAL
Qty: 1 EACH | Refills: 0 | Status: SHIPPED | OUTPATIENT
Start: 2023-01-01

## 2023-01-01 RX ORDER — LISINOPRIL AND HYDROCHLOROTHIAZIDE 12.5; 2 MG/1; MG/1
1 TABLET ORAL DAILY
Qty: 90 TABLET | Refills: 3 | Status: SHIPPED | OUTPATIENT
Start: 2023-01-01

## 2023-01-01 RX ORDER — LISINOPRIL AND HYDROCHLOROTHIAZIDE 12.5; 2 MG/1; MG/1
TABLET ORAL
Qty: 90 TABLET | Refills: 0 | OUTPATIENT
Start: 2023-01-01

## 2023-01-01 RX ORDER — DULOXETIN HYDROCHLORIDE 60 MG/1
60 CAPSULE, DELAYED RELEASE ORAL 2 TIMES DAILY
COMMUNITY
Start: 2023-01-01

## 2023-01-01 RX ORDER — ATORVASTATIN CALCIUM 20 MG/1
20 TABLET, FILM COATED ORAL DAILY
Qty: 90 TABLET | Refills: 3 | Status: SHIPPED | OUTPATIENT
Start: 2023-01-01

## 2023-01-01 RX ORDER — SYRING-NEEDL,DISP,INSUL,0.3 ML 31GX15/64"
SYRINGE, EMPTY DISPOSABLE MISCELLANEOUS
Qty: 300 EACH | Refills: 11 | Status: SHIPPED | OUTPATIENT
Start: 2023-01-01

## 2023-01-01 RX ORDER — INSULIN DETEMIR 100 [IU]/ML
36 INJECTION, SOLUTION SUBCUTANEOUS 2 TIMES DAILY
Qty: 40 ML | Refills: 3 | Status: SHIPPED | OUTPATIENT
Start: 2023-01-01

## 2023-01-01 ASSESSMENT — PAIN SCALES - GENERAL
PAINLEVEL: NO PAIN (0)
PAINLEVEL: MODERATE PAIN (4)
PAINLEVEL: NO PAIN (0)
PAINLEVEL: MILD PAIN (2)
PAINLEVEL: NO PAIN (0)

## 2023-01-01 ASSESSMENT — ANXIETY QUESTIONNAIRES
8. IF YOU CHECKED OFF ANY PROBLEMS, HOW DIFFICULT HAVE THESE MADE IT FOR YOU TO DO YOUR WORK, TAKE CARE OF THINGS AT HOME, OR GET ALONG WITH OTHER PEOPLE?: VERY DIFFICULT
5. BEING SO RESTLESS THAT IT IS HARD TO SIT STILL: SEVERAL DAYS
6. BECOMING EASILY ANNOYED OR IRRITABLE: SEVERAL DAYS
5. BEING SO RESTLESS THAT IT IS HARD TO SIT STILL: MORE THAN HALF THE DAYS
5. BEING SO RESTLESS THAT IT IS HARD TO SIT STILL: NOT AT ALL
2. NOT BEING ABLE TO STOP OR CONTROL WORRYING: NOT AT ALL
1. FEELING NERVOUS, ANXIOUS, OR ON EDGE: SEVERAL DAYS
3. WORRYING TOO MUCH ABOUT DIFFERENT THINGS: NOT AT ALL
IF YOU CHECKED OFF ANY PROBLEMS ON THIS QUESTIONNAIRE, HOW DIFFICULT HAVE THESE PROBLEMS MADE IT FOR YOU TO DO YOUR WORK, TAKE CARE OF THINGS AT HOME, OR GET ALONG WITH OTHER PEOPLE: SOMEWHAT DIFFICULT
GAD7 TOTAL SCORE: 4
GAD7 TOTAL SCORE: 13
4. TROUBLE RELAXING: SEVERAL DAYS
GAD7 TOTAL SCORE: 4
GAD7 TOTAL SCORE: 13
7. FEELING AFRAID AS IF SOMETHING AWFUL MIGHT HAPPEN: NOT AT ALL
7. FEELING AFRAID AS IF SOMETHING AWFUL MIGHT HAPPEN: SEVERAL DAYS
7. FEELING AFRAID AS IF SOMETHING AWFUL MIGHT HAPPEN: NOT AT ALL
6. BECOMING EASILY ANNOYED OR IRRITABLE: NOT AT ALL
IF YOU CHECKED OFF ANY PROBLEMS ON THIS QUESTIONNAIRE, HOW DIFFICULT HAVE THESE PROBLEMS MADE IT FOR YOU TO DO YOUR WORK, TAKE CARE OF THINGS AT HOME, OR GET ALONG WITH OTHER PEOPLE: VERY DIFFICULT
7. FEELING AFRAID AS IF SOMETHING AWFUL MIGHT HAPPEN: NOT AT ALL
4. TROUBLE RELAXING: SEVERAL DAYS
IF YOU CHECKED OFF ANY PROBLEMS ON THIS QUESTIONNAIRE, HOW DIFFICULT HAVE THESE PROBLEMS MADE IT FOR YOU TO DO YOUR WORK, TAKE CARE OF THINGS AT HOME, OR GET ALONG WITH OTHER PEOPLE: SOMEWHAT DIFFICULT
8. IF YOU CHECKED OFF ANY PROBLEMS, HOW DIFFICULT HAVE THESE MADE IT FOR YOU TO DO YOUR WORK, TAKE CARE OF THINGS AT HOME, OR GET ALONG WITH OTHER PEOPLE?: SOMEWHAT DIFFICULT
7. FEELING AFRAID AS IF SOMETHING AWFUL MIGHT HAPPEN: SEVERAL DAYS
1. FEELING NERVOUS, ANXIOUS, OR ON EDGE: MORE THAN HALF THE DAYS
3. WORRYING TOO MUCH ABOUT DIFFERENT THINGS: MORE THAN HALF THE DAYS
2. NOT BEING ABLE TO STOP OR CONTROL WORRYING: MORE THAN HALF THE DAYS
3. WORRYING TOO MUCH ABOUT DIFFERENT THINGS: SEVERAL DAYS
4. TROUBLE RELAXING: SEVERAL DAYS
6. BECOMING EASILY ANNOYED OR IRRITABLE: NEARLY EVERY DAY
1. FEELING NERVOUS, ANXIOUS, OR ON EDGE: SEVERAL DAYS
GAD7 TOTAL SCORE: 4
GAD7 TOTAL SCORE: 4
2. NOT BEING ABLE TO STOP OR CONTROL WORRYING: SEVERAL DAYS
GAD7 TOTAL SCORE: 4
GAD7 TOTAL SCORE: 13

## 2023-01-01 ASSESSMENT — PATIENT HEALTH QUESTIONNAIRE - PHQ9
10. IF YOU CHECKED OFF ANY PROBLEMS, HOW DIFFICULT HAVE THESE PROBLEMS MADE IT FOR YOU TO DO YOUR WORK, TAKE CARE OF THINGS AT HOME, OR GET ALONG WITH OTHER PEOPLE: EXTREMELY DIFFICULT
SUM OF ALL RESPONSES TO PHQ QUESTIONS 1-9: 7
SUM OF ALL RESPONSES TO PHQ QUESTIONS 1-9: 18
SUM OF ALL RESPONSES TO PHQ QUESTIONS 1-9: 18
10. IF YOU CHECKED OFF ANY PROBLEMS, HOW DIFFICULT HAVE THESE PROBLEMS MADE IT FOR YOU TO DO YOUR WORK, TAKE CARE OF THINGS AT HOME, OR GET ALONG WITH OTHER PEOPLE: SOMEWHAT DIFFICULT
SUM OF ALL RESPONSES TO PHQ QUESTIONS 1-9: 4
SUM OF ALL RESPONSES TO PHQ QUESTIONS 1-9: 7
10. IF YOU CHECKED OFF ANY PROBLEMS, HOW DIFFICULT HAVE THESE PROBLEMS MADE IT FOR YOU TO DO YOUR WORK, TAKE CARE OF THINGS AT HOME, OR GET ALONG WITH OTHER PEOPLE: NOT DIFFICULT AT ALL
SUM OF ALL RESPONSES TO PHQ QUESTIONS 1-9: 4
SUM OF ALL RESPONSES TO PHQ QUESTIONS 1-9: 4
10. IF YOU CHECKED OFF ANY PROBLEMS, HOW DIFFICULT HAVE THESE PROBLEMS MADE IT FOR YOU TO DO YOUR WORK, TAKE CARE OF THINGS AT HOME, OR GET ALONG WITH OTHER PEOPLE: SOMEWHAT DIFFICULT
SUM OF ALL RESPONSES TO PHQ QUESTIONS 1-9: 4

## 2023-01-09 PROBLEM — I10 BENIGN ESSENTIAL HYPERTENSION: Status: ACTIVE | Noted: 2023-01-01

## 2023-01-09 NOTE — PROGRESS NOTES
Assessment & Plan   Problem List Items Addressed This Visit        Endocrine    Diabetes mellitus type I (H)    Relevant Medications    LEVEMIR VIAL 100 UNIT/ML soln    insulin aspart (NOVOLOG PEN) 100 UNIT/ML pen    Other Relevant Orders    Lipid Panel (Completed)    Adult Eye  Referral    Albumin Random Urine Quantitative with Creat Ratio (Completed)       Circulatory    Benign essential hypertension    Relevant Medications    lisinopril (ZESTRIL) 20 MG tablet   Other Visit Diagnoses     Uncontrolled type 1 diabetes mellitus with hyperglycemia (H)    -  Primary    Relevant Medications    atorvastatin (LIPITOR) 10 MG tablet    lisinopril (ZESTRIL) 20 MG tablet    Continuous Blood Gluc  (DEXCOM G6 ) DIANA    Continuous Blood Gluc Sensor (DEXCOM G6 SENSOR) MISC    Continuous Blood Gluc Transmit (DEXCOM G6 TRANSMITTER) MISC    LEVEMIR VIAL 100 UNIT/ML soln    insulin aspart (NOVOLOG PEN) 100 UNIT/ML pen    Other Relevant Orders    Hemoglobin A1c (Completed)    FOOT EXAM (Completed)    Lipid Panel (Completed)    Adult Eye  Referral    UA reflex to Microscopic (Completed)    Albumin Random Urine Quantitative with Creat Ratio (Completed)    Lipoma of skin and subcutaneous tissue        Relevant Orders    Adult General Surg Referral    Need for pneumococcal vaccination        Relevant Orders    PNEUMOCOCCAL 20 VALENT CONJUGATE (PREVNAR 20) (Completed)         Patient was given Prevnar 20.    Refer to general surgery for consult with the lipomas.  Right lower quadrant abdominal lipoma versus hernia.    Hypertension: Patient's blood pressure is elevated today.  Highly stressed the need to restart his blood pressure medications.  Encourage close follow-up for blood pressure monitoring.  Needs recheck in 2 to 3 weeks for medication monitoring to ensure that his blood pressure is stable.  Blood pressure was elevated in clinic today.  Gave warning signs and symptoms.    Type 1 diabetes  "mellitus: Refilled medication.  Highly stressed the need to restart his medications.  Referred to diabetic education for a consult.  Completed urinalysis along with testing urine for protein to rule out concerns including infection.  Encourage good diet, exercise, and decreasing sugar in his diet to help improve his blood sugar results.    Recheck for diabetes in 3 months.    30 minutes spent on the date of the encounter doing chart review, history and exam, documentation and further activities per the note       BMI:   Estimated body mass index is 28.94 kg/m  as calculated from the following:    Height as of 12/24/22: 1.753 m (5' 9\").    Weight as of this encounter: 88.9 kg (196 lb).   Weight management plan: Discussed healthy diet and exercise guidelines    See Patient Instructions    Return in about 3 months (around 4/9/2023) for Diabetic recheck.    Nelida Ndiaye PA-C  [facility]    Cyrus Gan is a 48 year old, presenting for the following health issues:  Diabetes      History of Present Illness       Back Pain:  He presents for follow up of back pain. Patient's back pain is a new problem.    Original cause of back pain: not sure  First noticed back pain: more than 1 month ago  Patient feels back pain: comes and goesLocation of back pain:  Right lower back and left lower back  Description of back pain: burning and gnawing  Back pain spreads: left buttocks, right knee and right foot    Since patient first noticed back pain, pain is: gradually worsening  Does back pain interfere with his job:  Not applicable  On a scale of 1-10 (10 being the worst), patient describes pain as:  7  What makes back pain worse: lying down  Acupuncture: not tried  Acetaminophen: not tried  Activity or exercise: not helpful  Chiropractor:  Not tried  Cold: not tried  Heat: helpful  Massage: not tried  Muscle relaxants: not tried  NSAIDS: not helpful  Opioids: not tried  Physical Therapy: not tried  Rest: " helpful  Steroid Injection: not tried  Stretching: helpful  Surgery: not tried  TENS unit: not tried  Topical pain relievers: not tried  Other healthcare providers patient is seeing for back pain: None    Mental Health Follow-up:  Patient presents to follow-up on Depression & Anxiety.Patient's depression since last visit has been:  Worse  The patient is having other symptoms associated with depression.  Patient's anxiety since last visit has been:  Better  The patient is having other symptoms associated with anxiety.  Any significant life events: relationship concerns, job concerns, financial concerns and grief or loss  Patient is feeling anxious or having panic attacks.  Patient has no concerns about alcohol or drug use.    He eats 2-3 servings of fruits and vegetables daily.He consumes 0 sweetened beverage(s) daily.He exercises with enough effort to increase his heart rate 9 or less minutes per day.  He exercises with enough effort to increase his heart rate 3 or less days per week. He is missing 1 dose(s) of medications per week.    Today's PHQ-9         PHQ-9 Total Score: 18    PHQ-9 Q9 Thoughts of better off dead/self-harm past 2 weeks :   Several days  Thoughts of suicide or self harm: (P) No  Self-harm Plan:     Self-harm Action:       Safety concerns for self or others: (P) No    How difficult have these problems made it for you to do your work, take care of things at home, or get along with other people: Extremely difficult  Today's ABDON-7 Score: 13       Diabetes Follow-up    How often are you checking your blood sugar? Three times daily  Blood sugar testing frequency justification:  Adjustment of medication(s)  What time of day are you checking your blood sugars (select all that apply)?  Before and after meals and At bedtime  Have you had any blood sugars above 200?  Yes 600  Have you had any blood sugars below 70?  Yes 50    What symptoms do you notice when your blood sugar is low?  Shaky, Dizzy, Weak and  Lethargy    What concerns do you have today about your diabetes? None and Blood sugar is often over 200     Do you have any of these symptoms? (Select all that apply)  No numbness or tingling in feet.  No redness, sores or blisters on feet.  No complaints of excessive thirst.  No reports of blurry vision.  No significant changes to weight.    Have you had a diabetic eye exam in the last 12 months? No        BP Readings from Last 2 Encounters:   23 (!) 161/100   22 (!) 154/92     Hemoglobin A1C (%)   Date Value   2023 10.4 (H)   2022 10.0 (H)     LDL Cholesterol Calculated (mg/dL)   Date Value   2023 167 (H)   2022 134 (H)   2016 137 (H)       Diabetes:   Dx   Blood sugar results: 300-400  Problems with medication: none   Symptoms of low blood sugars: lightheaded  Diet and exercise: working on it  Blood pressure: stable  Ace or Arb: no, needs to restart  Foot sores, numbness or tingling: none  Last foot check: 2023  Urinary symptoms: some low back pain  Last urine protein screenin2022   Taking aspirin daily: hx  Taking cholesterol medication: no, needs to restart  Last eye check: 1 year  Smoking: former  ER visits with DM: yes    Patient has had history of type 1 diabetes mellitus since .  Not currently taking his blood sugar medications.  Was previously using a Dexcom however it kept falling off.  Interested in trying again.  Interested in seeing diabetic education for a consult.  Patient needs to restart his medications.  Would like to establish care.  Last took medications a few months ago.  Patient takes NovoLog for mealtime insulin and Levemir 24 units twice daily.  Typically tolerates this medication well.  No side effects noted.    Patient has occasional right lower quadrant abdominal pain.  Unsure if he has a hernia.  Has a lump on his right lower quadrant that is mildly tender at times.  Also notices a lump on his left lower chest and left upper  arm.    Patient sees therapy for medication monitoring.    Review of Systems   Constitutional, HEENT, cardiovascular, pulmonary, gi and gu systems are negative, except as otherwise noted.      Objective    BP (!) 161/100 (BP Location: Right arm, Patient Position: Sitting, Cuff Size: Adult Large)   Pulse 82   Temp 97.5  F (36.4  C) (Tympanic)   Resp 18   Wt 88.9 kg (196 lb)   SpO2 99%   BMI 28.94 kg/m    Body mass index is 28.94 kg/m .  Physical Exam  Vitals and nursing note reviewed.   Constitutional:       Appearance: Normal appearance. He is well-developed.   HENT:      Head: Normocephalic.   Eyes:      Extraocular Movements: Extraocular movements intact.      Conjunctiva/sclera: Conjunctivae normal.      Pupils: Pupils are equal, round, and reactive to light.   Neck:      Thyroid: No thyromegaly.   Cardiovascular:      Rate and Rhythm: Normal rate and regular rhythm.      Heart sounds: Normal heart sounds. No murmur heard.  Pulmonary:      Effort: Pulmonary effort is normal. No respiratory distress.      Breath sounds: Normal breath sounds. No wheezing or rales.   Musculoskeletal:         General: Normal range of motion.      Cervical back: Normal range of motion and neck supple.   Lymphadenopathy:      Cervical: No cervical adenopathy.   Skin:     General: Skin is warm and dry.      Comments: No lesions on feet bilaterally.   Neurological:      Mental Status: He is alert and oriented to person, place, and time.      Comments: Diabetic foot exam:  Normal diabetic monofilament sensation exam bilaterally. No sores or calluses appreciated.    Psychiatric:         Mood and Affect: Mood normal.         Behavior: Behavior normal.

## 2023-01-09 NOTE — TELEPHONE ENCOUNTER
"Attempted to call, the phone line states \"were sorry, this call cannot be completed at this time, try again later\"  Will attempt to call later.  ..Erin Olivas, LPN on 1/9/2023 at 3:47 PM    "

## 2023-01-09 NOTE — LETTER
January 12, 2023      Malik Melendez  [address]   [place] [address]        Dear ,    We are writing to inform you of your test results.    Your hemoglobin A1c is slightly elevated at 10.4.  Please work on good diet, exercise, weight loss, and reducing sugar and carbohydrates in your diet to improve your blood sugars.    Your cholesterol level is slightly elevated.  I increased your Lipitor to 20 mg daily to better control your cholesterol.  Please have your cholesterol level rechecked at your next diabetic recheck.    There is an increased amount of protein in your urine.  This has increased as compared to previous.  I would recommend seeing a nephrologist which is a kidney specialist to rule out concerns with your kidneys.  Please work on improving your blood sugars to help reduce future kidney concerns.    Resulted Orders   Albumin Random Urine Quantitative with Creat Ratio   Result Value Ref Range    Albumin Urine mg/L 538.8 mg/L      Comment:      The reference ranges have not been established in urine albumin. The results should be integrated into the clinical context for interpretation.    Albumin Urine mg/g Cr 893.53 (H) 0.00 - 17.00 mg/g Cr      Comment:      Microalbuminuria is defined as an albumin:creatinine ratio of 17 to 299 for males and 25 to 299 for females. A ratio of albumin:creatinine of 300 or higher is indicative of overt proteinuria.  Due to biologic variability, positive results should be confirmed by a second, first-morning random or 24-hour timed urine specimen. If there is discrepancy, a third specimen is recommended. When 2 out of 3 results are in the microalbuminuria range, this is evidence for incipient nephropathy and warrants increased efforts at glucose control, blood pressure control, and institution of therapy with an angiotensin-converting-enzyme (ACE) inhibitor (if the patient can tolerate it).      Creatinine Urine mg/dL 60.3 mg/dL      Comment:      The reference ranges  have not been established in urine creatinine. The results should be integrated into the clinical context for interpretation.   UA reflex to Microscopic   Result Value Ref Range    Color Urine Light Yellow Colorless, Straw, Light Yellow, Yellow    Appearance Urine Clear Clear    Glucose Urine >1000 (A) Negative mg/dL    Bilirubin Urine Negative Negative    Ketones Urine Negative Negative mg/dL    Specific Gravity Urine 1.011 1.000 - 1.030    Blood Urine Negative Negative    pH Urine 5.5 5.0 - 9.0    Protein Albumin Urine 70 (A) Negative mg/dL    Urobilinogen Urine Normal Normal, 2.0 mg/dL    Nitrite Urine Negative Negative    Leukocyte Esterase Urine Negative Negative    RBC Urine <1 <=2 /HPF    WBC Urine 0 <=5 /HPF    Mucus Urine Present (A) None Seen /LPF    Hyaline Casts Urine 3 (H) <=2 /LPF   Lipid Panel   Result Value Ref Range    Cholesterol 280 (H) <200 mg/dL    Triglycerides 225 (H) <150 mg/dL    Direct Measure HDL 68 >=40 mg/dL    LDL Cholesterol Calculated 167 (H) <=100 mg/dL    Non HDL Cholesterol 212 (H) <130 mg/dL    Narrative    Cholesterol  Desirable:  <200 mg/dL    Triglycerides  Normal:  Less than 150 mg/dL  Borderline High:  150-199 mg/dL  High:  200-499 mg/dL  Very High:  Greater than or equal to 500 mg/dL    Direct Measure HDL  Female:  Greater than or equal to 50 mg/dL   Male:  Greater than or equal to 40 mg/dL    LDL Cholesterol  Desirable:  <100mg/dL  Above Desirable:  100-129 mg/dL   Borderline High:  130-159 mg/dL   High:  160-189 mg/dL   Very High:  >= 190 mg/dL    Non HDL Cholesterol  Desirable:  130 mg/dL  Above Desirable:  130-159 mg/dL  Borderline High:  160-189 mg/dL  High:  190-219 mg/dL  Very High:  Greater than or equal to 220 mg/dL   Hemoglobin A1c   Result Value Ref Range    Hemoglobin A1C 10.4 (H) 4.0 - 6.2 %       If you have any questions or concerns, please call the clinic at the number listed above.       Sincerely,      Nelida Ndiaye PA-C

## 2023-01-09 NOTE — NURSING NOTE
Pt presents to clinic today for a diabetic check.  Previous A1C is not at goal of <8  Lab Results   Component Value Date    A1C 10.0 02/18/2022     Urine Microalbumin/Creat:    Albumin Urine mg/L   Date Value Ref Range Status   02/18/2022 212 mg/L Final     Albumin Urine mg/g Cr   Date Value Ref Range Status   02/18/2022 481.82 (H) 0.00 - 17.00 mg/g Cr Final     Creatinine Urine mg/dL   Date Value Ref Range Status   02/18/2022 44 mg/dL Final     Foot exam due  Eye exam due    Tobacco User yes, chews  Patient is not on a daily aspirin  Patient is not on a Statin.  Blood pressure today of:     BP Readings from Last 1 Encounters:   12/24/22 (!) 154/92      is not at the goal of <139/89 for diabetics.    Erin Olivas LPN on 1/9/2023 at 11:51 AM          FOOD SECURITY SCREENING QUESTIONS:    The next two questions are to help us understand your food security.  If you are feeling you need any assistance in this area, we have resources available to support you today.    Hunger Vital Signs:  Within the past 12 months we worried whether our food would run out before we got money to buy more. Never  Within the past 12 months the food we bought just didn't last and we didn't have money to get more. Never        Advance care directive on file? no  Advance care directive provided to patient? no     Medication Reconciliation: complete  Erin Olivas LPN,LPN on 1/9/2023 at 11:51 AM

## 2023-01-09 NOTE — TELEPHONE ENCOUNTER
Please call  Please let patient know that his blood pressure was elevated in clinic.  I would recommend having him restart his blood pressure medications and setting up a recheck appointment in 2-3 weeks for monitoring.  Nelida Ndiaye PA-C.......... 1/9/2023 2:20 PM

## 2023-01-12 NOTE — TELEPHONE ENCOUNTER
"Attempted to call, the phone line states \"were sorry, this call cannot be completed at this time, try again later\"  Will attempt to call later.  ..Erin Olivas, LPN on 1/12/2023 at 1:16 PM    "

## 2023-01-12 NOTE — TELEPHONE ENCOUNTER
Please call    Your hemoglobin A1c is slightly elevated at 10.4.  Please work on good diet, exercise, weight loss, and reducing sugar and carbohydrates in your diet to improve your blood sugars.    Your cholesterol level is slightly elevated.  I increased your Lipitor to 20 mg daily to better control your cholesterol.  Please have your cholesterol level rechecked at your next diabetic recheck.    There is an increased amount of protein in your urine.  This has increased as compared to previous.  I would recommend seeing a nephrologist which is a kidney specialist to rule out concerns with your kidneys.  Please work on improving your blood sugars to help reduce future kidney concerns.  Nelida Ndiaye PA-C.......... 1/12/2023 3:29 PM

## 2023-01-12 NOTE — TELEPHONE ENCOUNTER
Was able to get ahold of patient, states he is okay to start a BP medication and will  when he picks up his Lipitor at [facility].  ..Erin Olivas LPN on 1/12/2023 at 3:39 PM

## 2023-02-01 NOTE — PROGRESS NOTES
"Diabetes Self-Management Education & Support    Presents for: Individual review    Type of Service: In Person Visit    Assessment Type:   REPORTS:    -----------------------------  Dexcom Clarity  -----------------------------  Malik Melendez  YOB: 1975  Generated at: Thu, Feb 1, 2023 11:09 AM CST  Reporting period: Thu Jan 19, 2023 - Wed Feb 1, 2023  -----------------------------  Glucose Details  Average glucose: 226 mg/dL  Standard deviation: 105 mg/dL Goal for SD is +-50 mg/dl or lower.      GMI: 8.7%  -----------------------------  Time in Range  Very High: 37%  High: 23%  In Range: 36%  Low: 3%  Very Low: 1%    Target Range   mg/dL    -----------------------------  CGM Details  Sensor usage: 100%  Days with CGM data: 14/14      Malik had a pattern of significant highs between 4:25 AM and 3:40 PM.  15 high events contributed to this pattern. None of the contributing events were rebound highs.    No significant patterns of lows found.    Reviewed insulin dosing and current plan.  Patient shares he takes Novolog before meals, but if glucose goes too high, he will take more after the meal, \"sometimes I go low.\"    Per diet recall and Novolog insulin dosed, patient is using ICR 1:5 grams.      Patient states if  he will take 12 extra units to get blood sugar to target (100 mg/dL) which is approximately 1 unit per 17 mg/dL.    Reviewed insulin action and plan to help lower Standard Deviation and bring glucose closer to target without additional risk of hypoglycemia.    PLAN:    1.  Recommend patient use ICR 1:5 grams for CHO at mealtimes.  Practice sheet given with carbohydrate food list.    2.  Recommend patient use Novolog Sliding Scale for correction of high BG as:  Blood Glucose       Units of Novolog insulin  150  to  199 = 3 units of extra insulin  200  to  249 = 6 units of extra insulin  250  to  299 = 9 units of extra insulin  300  to  349 = 12 units of extra insulin  Equal to or " greater than 350 =   15 units of extra insulin     3.  Discussed trial of Lyumjev insulin to help decrease risk of highs and lows after meals.      Recommend patient bring glucose under tighter management before Lyumjev trial.        Pt verbalized understanding of concepts discussed and recommendations provided today.       Continue education with the following diabetes management concepts: Healthy Eating, Problem Solving, Reducing Risks and Healthy Coping    Topics to cover at upcoming visits: Healthy Eating, Taking Medication, Problem Solving, Reducing Risks and Healthy Coping    Follow-up:  Visit scheduled today for Wednesday, 2/15, 11:00 am.    See Care Plan for co-developed, patient-state behavior change goals.  AVS provided for patient today.    Education Materials Provided:  My Insulin Plan, Novolog Sliding Scale worksheet, My Food Plan, Information on Perfect Portions Food Scale      SUBJECTIVE/OBJECTIVE:  Presents for: Individual review  Accompanied by: Self  Diabetes education in the past 24mo: No  Focus of Visit: Problem Solving, Taking Medication, Assistance w/ making life changes, CGM  Type of CGM visit: Personal CGM Follow-up  Diabetes type: Type 1  Disease course: Improving  How confident are you filling out medical forms by yourself:: Somewhat  Cultural Influences/Ethnic Background:  Not  or     Diabetes Symptoms & Complications:  Fatigue: Sometimes  Neuropathy: Sometimes  Polydipsia: Sometimes  Polyphagia: Yes  Polyuria: Sometimes  Visual change: Sometimes  Slow healing wounds: Yes  Autonomic neuropathy: Yes  CVA: No  Heart disease: No  Nephropathy: Yes  Peripheral neuropathy: Yes  Peripheral Vascular Disease: No  Retinopathy: Yes  Sexual dysfunction: Yes    Patient Problem List and Family Medical History reviewed for relevant medical history, current medical status, and diabetes risk factors.    Vitals:  There were no vitals taken for this visit.  Estimated body mass index is 28.94  "kg/m  as calculated from the following:    Height as of 12/24/22: 1.753 m (5' 9\").    Weight as of 1/9/23: 88.9 kg (196 lb).   Last 3 BP:   BP Readings from Last 3 Encounters:   01/09/23 (!) 161/100   12/24/22 (!) 154/92   08/16/22 (!) 139/95       History   Smoking Status     Former     Types: Cigarettes   Smokeless Tobacco     Current       Labs:  Lab Results   Component Value Date    A1C 10.4 01/09/2023     Lab Results   Component Value Date     12/24/2022     12/24/2022     04/14/2022    GLC 62 06/19/2021     Lab Results   Component Value Date     01/09/2023     08/19/2016     HDL Cholesterol   Date Value Ref Range Status   08/19/2016 58 23 - 92 mg/dL      Direct Measure HDL   Date Value Ref Range Status   01/09/2023 68 >=40 mg/dL Final   ]  GFR Estimate   Date Value Ref Range Status   12/24/2022 >90 >60 mL/min/1.73m2 Final     Comment:     Effective December 21, 2021 eGFRcr in adults is calculated using the 2021 CKD-EPI creatinine equation which includes age and gender (Baltazar et al., NEJ, DOI: 10.1056/AJNMyz3601010)   06/19/2021 70 >60 mL/min/[1.73_m2] Final     GFR Estimate If Black   Date Value Ref Range Status   06/19/2021 84 >60 mL/min/[1.73_m2] Final     Lab Results   Component Value Date    CR 0.94 12/24/2022    CR 1.13 06/19/2021     No results found for: MICROALBUMIN    Healthy Eating:  Cultural/Alevism diet restrictions?: No  Meal planning/habits: Calorie counting, High carb, Frequent snacking  How many times a week on average do you eat food made away from home (restaurant/take-out)?: 0  Meals include: Dinner, Morning Snack, Evening Snack  Beverages: Water, Tea, Milk, Diet soda    Being Active:  Barrier to exercise: None    Monitoring:  Blood Glucose Meter: ContourNext  Times checking blood sugar at home (number): 3  Times checking blood sugar at home (per): Day  Blood glucose trend: Increasing    Taking Medications:  Diabetes Medication(s)     Insulin       " insulin aspart (NOVOLOG PEN) 100 UNIT/ML pen    Sliding scale as directed, max of 30 units daily     LEVEMIR VIAL 100 UNIT/ML soln    Inject 26 Units Subcutaneous 2 times daily        Current Treatments: Diet, Insulin Injections    Reducing Risks:  CAD Risks: Diabetes Mellitus, Hypertension, Tobacco exposure  Has dilated eye exam at least once a year?: Yes  Sees dentist every 6 months?: No  Feet checked by healthcare provider in the last year?: Yes    Healthy Coping:  Informal Support system:: Family, Partner  Patient Activation Measure Survey Score:  No flowsheet data found.      Care Plan and Education Provided:  Care Plan: Diabetes   Updates made by Ayanna Cabrera RN since 2/1/2023 12:00 AM      Problem: HbA1C Not In Goal       Goal: Establish Regular Follow-Ups with PCP       Task: Discuss with PCP the recommended timing for patient's next follow up visit(s)    Responsible User: Ayanna Cabrera RN      Task: Discuss schedule for PCP visits with patient Completed 2/1/2023   Responsible User: Ayanna Cabrera RN      Goal: Get HbA1C Level in Goal       Task: Educate patient on diabetes education self-management topics Completed 2/1/2023   Responsible User: Ayanna Cabrera RN      Task: Educate patient on benefits of regular glucose monitoring Completed 2/1/2023   Responsible User: Ayanna Cabrera RN      Task: Refer patient to appropriate extended care team member, as needed (Medication Therapy Management, Behavioral Health, Physical Therapy, etc.)    Responsible User: Ayanna Cabrera RN      Task: Discuss diabetes treatment plan with patient Completed 2/1/2023   Responsible User: Ayanna Cabrera RN      Problem: Diabetes Self-Management Education Needed to Optimize Self-Care Behaviors       Goal: Understand diabetes pathophysiology and disease progression       Task: Provide education on diabetes pathophysiology and disease progression specfic to patient's diabetes type    Responsible User: Deborah  ENOC Urena      Goal: Healthy Eating - follow a healthy eating pattern for diabetes       Task: Provide education on portion control and consistency in amount, composition and timing of food intake    Responsible User: Ayanna Cabrera RN      Task: Provide education on managing carbohydrate intake (carbohydrate counting, plate planning method, etc.)    Responsible User: Ayanna Cabrera RN      Task: Provide education on weight management    Responsible User: Ayanna Cabrera RN      Task: Provide education on heart healthy eating    Responsible User: Ayanna Cabrera RN      Task: Provide education on eating out    Responsible User: Ayanna Cabrera RN      Task: Develop individualized healthy eating plan with patient    Responsible User: Ayanna Cabrera RN      Goal: Being Active - get regular physical activity, working up to at least 150 minutes per week       Task: Provide education on relationship of activity to glucose and precautions to take if at risk for low glucose    Responsible User: Ayanna Cabrera RN      Task: Discuss barriers to physical activity with patient    Responsible User: Ayanna Cabrera RN      Task: Develop physical activity plan with patient    Responsible User: Ayanna Cabrera RN      Task: Explore community resources including walking groups, assistance programs, and home videos    Responsible User: Ayanna Cabrera RN      Goal: Monitoring - monitor glucose and ketones as directed       Task: Provide education on blood glucose monitoring (purpose, proper technique, frequency, glucose targets, interpreting results, when to use glucose control solution, sharps disposal) Completed 2/1/2023   Responsible User: Ayanna Cabrera RN      Task: Provide education on continuous glucose monitoring (sensor placement, use of qasim or /reader, understanding glucose trends, alerts and alarms, differences between sensor glucose and blood glucose) Completed 2/1/2023   Responsible User:  Ayanna Cabrera RN      Task: Provide education on ketone monitoring (when to monitor, frequency, etc.)    Responsible User: Ayanna Cabrera RN      Goal: Taking Medication - patient is consistently taking medications as directed       Task: Provide education on action of prescribed medication, including when to take and possible side effects Completed 2/1/2023   Responsible User: Ayanna Cabrera RN      Task: Provide education on insulin and injectable diabetes medications, including administration, storage, site selection and rotation for injection sites Completed 2/1/2023   Responsible User: Ayanna Cabrera RN      Task: Discuss barriers to medication adherence with patient and provide management technique ideas as appropriate    Responsible User: Ayanna Cabrera RN      Task: Provide education on frequency and refill details of medications    Responsible User: Ayanna Cabrera RN      Goal: Problem Solving - know how to prevent and manage short-term diabetes complications       Task: Provide education on high blood glucose - causes, signs/symptoms, prevention and treatment Completed 2/1/2023   Responsible User: Ayanna Cabrera RN      Task: Provide education on low blood glucose - causes, signs/symptoms, prevention, treatment, carrying a carbohydrate source at all times, and medical identification Completed 2/1/2023   Responsible User: Ayanna Cabrera RN      Task: Provide education on safe travel with diabetes    Responsible User: Ayanna Cabrera RN      Task: Provide education on how to care for diabetes on sick days    Responsible User: Ayanna Cabrera RN      Task: Provide education on when to call a health care provider    Responsible User: Ayanna Cabrera RN      Goal: Reducing Risks - know how to prevent and treat long-term diabetes complications       Task: Provide education on major complications of diabetes, prevention, early diagnostic measures and treatment of complications    Responsible  User: Ayanna Cabrera RN      Task: Provide education on recommended care for dental, eye and foot health    Responsible User: Ayanna Cabrera RN      Task: Provide education on Hemoglobin A1c - goals and relationship to blood glucose levels    Responsible User: Ayanna Cabrera RN      Task: Provide education on recommendations for heart health - lipid levels and goals, blood pressure and goals, and aspirin therapy, if indicated    Responsible User: Ayanna Cabrera RN      Task: Provide education on tobacco cessation    Responsible User: Ayanna Cabrera RN      Goal: Healthy Coping - use available resources to cope with the challenges of managing diabetes       Task: Discuss recognizing feelings about having diabetes    Responsible User: Ayanna Cabrera RN      Task: Provide education on the benefits of making appropriate lifestyle changes Completed 2/1/2023   Responsible User: Ayanna Cabrera RN      Task: Provide education on benefits of utilizing support systems    Responsible User: Ayanna Cabrera RN      Task: Discuss methods for coping with stress    Responsible User: Ayanna Cabrera RN      Task: Provide education on when to seek professional counseling    Responsible User: Ayanna Cabrera RN Suzette Childs, RN, BSN, [facility]  2/1/2023 12:26 PM     Time Spent: 60 minutes  Encounter Type: Individual    Any diabetes medication dose changes were made via the CDE Protocol per the patient's primary care provider. A copy of this encounter was shared with the provider.

## 2023-02-02 NOTE — PATIENT INSTRUCTIONS
Diabetes Goals and Reminders    Your A1c test should be done every 3 months.  Your goal is less than 7%.   Your last result is:  Lab Results   Component Value Date    A1C 10.4 01/09/2023       Your LDL cholesterol test should be done at least once a year.  Take a statin, if prescribed by your doctor, based on age and risk factors.  Your last result is:  LDL Cholesterol Calculated   Date Value Ref Range Status   01/09/2023 167 (H) <=100 mg/dL Final   08/19/2016 137 (H) <100 mg/dL        Have blood pressure and weight checked every three months.  Your blood pressure goal is 130/80 or less.  Your last blood pressure reading was:   BP Readings from Last 1 Encounters:   01/09/23 (!) 161/100       Use your CGM to check sensor glucose readings a minimum of 4 times per day.  Your home glucose target ranges are:  Before meals:    2 hours after a meal:  Less than 180  Bedtime:  100-140 mg/dL    Avoid all tobacco.  Follow your healthy diet and exercise plan.  See the eye doctor every year.  See the dentist every six months.  Have kidney function tested yearly.    Take all medicine as prescribed.  Please let me know if you are having symptoms you don t expect or if you wish to stop any medicine.    Follow Up Plan  Please call or visit Diabetes Education if blood sugars are consistently out of target.  Your future lab plan is:  HgA1c in April 2023.    If you need your cholesterol checked at your next appointment, you should fast 8 to 10 hours before your appointment.  Do not eat or drink anything besides water.  Drink plenty of water and take all your usual medicine.    SUMMARY FOR TODAY'S VISIT    1.  Recommend using Insulin-to-Carb-Ratio (ICR) 1 unit per 5 grams of carb at mealtimes.  See My Food Plan for carbohydrate food list.    2.  Recommend using Novolog Sliding Scale for correction of high BG before each meal as:  Blood Glucose       Units of Novolog insulin  150  to  199 = 3 units of extra insulin  200  to  249 =  6 units of extra insulin  250  to  299 = 9 units of extra insulin  300  to  349 = 12 units of extra insulin  Equal to or greater than 350 =   15 units of extra insulin     3.  Discussed trial of Lyumjev mealtime insulin to help decrease risk of highs and lows after meals.      4.  Please follow up for Dexcom CGM review on Wednesday, 2/15, 11:00 am.    Ayanna Cabrera RN, BSN, [facility]  2/1/2023 2:39 PM

## 2023-02-15 NOTE — PROGRESS NOTES
Diabetes Self-Management Education & Support    Presents for: Follow-up    Type of Service: In Person Visit    Assessment Type:   ASSESSMENT:    Clarity CGM download glucose results:      -----------------------------  Dexcom Clarity  -----------------------------  Malik Melendez  YOB: 1975  Generated at: Wed, Feb 15, 2023 11:54 AM CST  Reporting period: u Feb 2, 2023 - Wed Feb 15, 2023  -----------------------------  Glucose Details  Average glucose: 254 mg/dL  Standard deviation: 111 mg/dL goal for SD is <50 mg/dL.  GMI: 9.4%  -----------------------------  Time in Range  Very High: 49%  High: 18%  In Range: 31%  Low: 1%  Very Low: <1%    Target Range   mg/dL    -----------------------------  CGM Details  Sensor usage: 93%  Days with CGM data: 13/14      PLAN:    To help lower glucose standard deviation with average 254 mg/dL, recommend Levemir increase from 27 to 30 units twice daily at 7:00 am and 7:00 pm.    Continue Novolog 15 - 20 units for your regular meals (breakfast and dinner) and begin Novolog 5 - 10 units for your snack lunch.    Continue Novolog high dose sliding scale for correction of high BG before meals:  Blood Glucose       Units of Novolog insulin  150  to  199 = 3 units of extra insulin  200  to  249 = 6 units of extra insulin  250  to  299 = 9 units of extra insulin  300  to  349 = 12 units of extra insulin  Equal to or greater than 350 =   15 units of extra insulin     Follow up in 2 weeks for CGM review and continued diabetes education.          Patient's most recent   Lab Results   Component Value Date    A1C 10.4 01/09/2023     is not meeting goal of <7.0    Diabetes knowledge and skills assessment:   Patient is knowledgeable in diabetes management concepts related to: Being Active and Monitoring    Continue education with the following diabetes management concepts: Healthy Eating, Taking Medication, Problem Solving, Reducing Risks and Healthy Coping    Based on  "learning assessment above, most appropriate setting for further diabetes education would be: Individual setting.      PLAN    See plan above.  See patient instructions for complete plan.   Topics to cover at upcoming visits: Healthy Eating, Problem Solving and Reducing Risks    Follow-up:  3/8/2023    See Care Plan for co-developed, patient-state behavior change goals.  AVS provided for patient today.    Education Materials Provided:  CGM Report with Diabetes Success Plan      SUBJECTIVE/OBJECTIVE:  Presents for: Follow-up  Accompanied by: Self  Diabetes education in the past 24mo: No  Focus of Visit: Problem Solving, Taking Medication, Assistance w/ making life changes, CGM  Type of CGM visit: Personal CGM Follow-up  Diabetes type: Type 1  Disease course: Improving  How confident are you filling out medical forms by yourself:: Somewhat  Cultural Influences/Ethnic Background:  Not  or       Diabetes Symptoms & Complications:  Fatigue: Sometimes  Neuropathy: Sometimes  Polydipsia: Sometimes  Polyphagia: Yes  Polyuria: Sometimes  Visual change: Sometimes  Slow healing wounds: Yes  Autonomic neuropathy: Yes  CVA: No  Heart disease: No  Nephropathy: Yes  Peripheral neuropathy: Yes  Peripheral Vascular Disease: No  Retinopathy: Yes  Sexual dysfunction: Yes    Patient Problem List and Family Medical History reviewed for relevant medical history, current medical status, and diabetes risk factors.    Vitals:  There were no vitals taken for this visit.  Estimated body mass index is 28.94 kg/m  as calculated from the following:    Height as of 12/24/22: 1.753 m (5' 9\").    Weight as of 1/9/23: 88.9 kg (196 lb).   Last 3 BP:   BP Readings from Last 3 Encounters:   01/09/23 (!) 161/100   12/24/22 (!) 154/92   08/16/22 (!) 139/95       History   Smoking Status     Former     Types: Cigarettes   Smokeless Tobacco     Current       Labs:  Lab Results   Component Value Date    A1C 10.4 01/09/2023     Lab Results "   Component Value Date     12/24/2022     12/24/2022     04/14/2022    GLC 62 06/19/2021     Lab Results   Component Value Date     01/09/2023     08/19/2016     HDL Cholesterol   Date Value Ref Range Status   08/19/2016 58 23 - 92 mg/dL      Direct Measure HDL   Date Value Ref Range Status   01/09/2023 68 >=40 mg/dL Final   ]  GFR Estimate   Date Value Ref Range Status   12/24/2022 >90 >60 mL/min/1.73m2 Final     Comment:     Effective December 21, 2021 eGFRcr in adults is calculated using the 2021 CKD-EPI creatinine equation which includes age and gender (Blatazar et al., NEJM, DOI: 10.1056/LZYLlv9310854)   06/19/2021 70 >60 mL/min/[1.73_m2] Final     GFR Estimate If Black   Date Value Ref Range Status   06/19/2021 84 >60 mL/min/[1.73_m2] Final     Lab Results   Component Value Date    CR 0.94 12/24/2022    CR 1.13 06/19/2021     No results found for: MICROALBUMIN    Healthy Eating:  Cultural/Church diet restrictions?: No  Meal planning/habits: Calorie counting, High carb, Frequent snacking  How many times a week on average do you eat food made away from home (restaurant/take-out)?: 0  Meals include: Dinner, Morning Snack, Evening Snack  Beverages: Water, Tea, Milk, Diet soda    Being Active:  Barrier to exercise: None    Monitoring:  Blood Glucose Meter: ContourNext, CGM  Times checking blood sugar at home (number): 3  Times checking blood sugar at home (per): Day  Blood glucose trend: Increasing    Taking Medications:  Diabetes Medication(s)     Insulin       insulin aspart (NOVOLOG PEN) 100 UNIT/ML pen    Sliding scale as directed, max of 30 units daily     LEVEMIR VIAL 100 UNIT/ML soln    Inject 26 Units Subcutaneous 2 times daily          Current Treatments: Diet, Insulin Injections  Given by: Patient    Problem Solving:  Is the patient at risk for hypoglycemia?: Yes  Hypoglycemia Frequency: Daily  Hypoglycemia Treatment: Other food  Is the patient at risk for DKA?:  Yes      Reducing Risks:  CAD Risks: Diabetes Mellitus, Hypertension, Tobacco exposure  Has dilated eye exam at least once a year?: Yes  Sees dentist every 6 months?: No  Feet checked by healthcare provider in the last year?: Yes    Healthy Coping:  Informal Support system:: Family, Partner  Stage of change: PREPARATION (Decided to change - considering how)  Support resources: Offerings in Clinic Communities  Patient Activation Measure Survey Score:  No flowsheet data found.      Care Plan and Education Provided:  Care Plan: Diabetes   Updates made by Ayanna Cabrera RN since 2/15/2023 12:00 AM      Problem: Diabetes Self-Management Education Needed to Optimize Self-Care Behaviors       Goal: Reducing Risks - know how to prevent and treat long-term diabetes complications       Task: Provide education on Hemoglobin A1c - goals and relationship to blood glucose levels Completed 2/15/2023   Responsible User: Ayanna Cabrera RN Suzette Childs, RN, BSN, CDCES  2/15/2023 12:33 PM     Time Spent: 60 minutes  Encounter Type: Individual    Any diabetes medication dose changes were made via the CDE Protocol per the patient's primary care provider. A copy of this encounter was shared with the provider.

## 2023-02-15 NOTE — PATIENT INSTRUCTIONS
CGM Report and Recommendations    -----------------------------  Dexcom Clarity  -----------------------------  Malik Nora  YOB: 1975  Generated at: Wed, Feb 15, 2023 11:54 AM CST  Reporting period: Thu Feb 2, 2023 - Wed Feb 15, 2023  -----------------------------  Glucose Details  Average glucose: 254 mg/dL  Standard deviation: 111 mg/dL  GMI: 9.4%  -----------------------------  Time in Range  Very High: 49%  High: 18%  In Range: 31%  Low: 1%  Very Low: <1%    Target Range   mg/dL    -----------------------------  CGM Details  Sensor usage: 93%  Days with CGM data: 13/14      Recommendations:    To help lower glucose standard deviation with average 254 mg/dL, recommend Levemir increase from 27 to 30 units twice daily at 7:00 am and 7:00 pm.    Continue Novolog 15 - 20 units for your regular meals (breakfast and dinner) and begin Novolog 5 - 10 units for your snack lunch.    Continue Novolog high dose sliding scale for correction of high BG before meals:  Blood Glucose       Units of Novolog insulin  150  to  199 = 3 units of extra insulin  200  to  249 = 6 units of extra insulin  250  to  299 = 9 units of extra insulin  300  to  349 = 12 units of extra insulin  Equal to or greater than 350 =   15 units of extra insulin     4.  Follow up in 2 weeks for CGM review and continued diabetes education.      Ayanna Cabrera, RN, BSN, [facility] 2/15/2023 11:54 AM

## 2023-02-24 NOTE — NURSING NOTE
TIMEOUT    Universal Protocol    A. Pre-procedure verification complete   1-relevant information / documentation available, reviewed and properly matched to the patient; 2-consent accurate and complete, 3-equipment and supplies available    B. Site marking complete   Site marked if not in continuous attendance with patient    C. TIME OUT completed   Time Out was conducted just prior to starting procedure to verify the eight required elements: 1-patient identity, 2-consent accurate and complete, 3-position, 4-correct side/site marked (if applicable), 5-procedure, 6-relevant images / results properly labeled and displayed (if applicable), 7-antibiotics / irrigation fluids (if applicable), 8-safety precautions.    Surgical Nurse  ....................  2/24/2023   10:02 AM

## 2023-02-24 NOTE — NURSING NOTE
"Chief Complaint   Patient presents with     Derm Problem     Are on left arm, stomach, and chest    Initial BP (!) 139/98 (BP Location: Right arm, Patient Position: Sitting, Cuff Size: Adult Large)   Pulse 83   Temp 98.6  F (37  C) (Tympanic)   Resp 17   Wt 93.2 kg (205 lb 6.4 oz)   SpO2 99%   BMI 30.33 kg/m   Estimated body mass index is 30.33 kg/m  as calculated from the following:    Height as of 12/24/22: 1.753 m (5' 9\").    Weight as of this encounter: 93.2 kg (205 lb 6.4 oz).  Meds Reconciled: complete      Andreina Russo RN      "

## 2023-02-24 NOTE — PROGRESS NOTES
Procedure Note     Pre/Post Operative Diagnosis:   Lipoma of abdomen, right lower quadrant, lipoma of left upper arm    Procedure:    Excision ofLipoma of abdomen, right lower quadrant, lipoma of left upper arm    Surgeon: DANIELA Renee MD     Local Anesthesia: 1% lidocaine with0.25%Marcaine with epinephrine    Indication for the procedure:    This is a 48 year old male patient with Lipoma of abdomen, right lower quadrant, lipoma of left upper arm.  The lipoma on the abdomen is particularly bothersome as it is right on the belt line.  He would like to have them removed.  Clinically, there is a 2 cm lipoma on the right lower quadrant of the abdomen, just above the belt line.  There is also a lipoma approximately 2 cm, on the left upper arm  After explaining the risks to include bleeding, infection, recurrence or need for re-excision, and scarring the patient wished to proceed.    Procedure:   The right lower quadrant of the abdomen was prepped and draped in usual sterile fashion with ChloraPrep. After adequate local anesthesia, a 3 cm incision was made directly over the palpable mass.  Dissection was carried down to the lipoma.  Lipoma is a lobulated lipoma and is excised with some normal surrounding adipose tissue as well.  The subcutaneous tissues were reapproximated with 3-0 vicryl in inverted interrupted fashion. The skin was closed with 4-0 monocryl suture in a running fashion.  Dermabond was applied.     The left upper arm was prepped and draped in usual sterile fashion with ChloraPrep. After adequate local anesthesia, a 2 cm incision made directly over the palpable mass.  Dissection was carried down to the lipoma which is a 1 large lobule.  This was easily dissected from the surrounding tissues and removed en bloc. The subcutaneous tissues were reapproximated with 3-0 vicryl in inverted interrupted fashion. The skin was closed with 4-0 monocryl suture in a running fashion.  Dermabond was applied.      Plan:  The patient will be called with pathology results.  Patient will followup if there any problems with the wound including redness or drainage.      DANIELA Renee MD

## 2023-02-24 NOTE — PATIENT INSTRUCTIONS
Your incision was closed with stitches that will dissolve.  A surgical glue was used to help keep the incision closed.    It is ok to get the incision wet in the shower on the day after your procedure. Pat dry the area. Do not rub.    Don't soak in a tub, pool or lake for 7 days.  If you have concerns, please call [phone] and ask for nurse in Unit [address] GERMAN Hdz RN  ....................  2/24/2023   10:04 AM

## 2023-03-08 NOTE — PATIENT INSTRUCTIONS
CGM Report and Recommendations    CGM Report:    Report shows 220 mg/dl average sensor glucose over the past two weeks.    Standard deviation (SD) is +- 108 mg/dl. Goal for SD is +-50 mg/dl or lower.      GMI (Glucose Management Indicator or HbA1c ) 8.6%.    Glucose 38% in target (70 - 180), 23% high (181 - 199), 35% very high (200 - 400), 3% low (56 - 69) and 1% very low (39 - 55).      Malik had a pattern of significant highs between 2:25 AM and 7:15 AM.  13 high events contributed to this pattern. None of the contributing events were rebound highs.    Malik had a pattern of significant highs between 4:55 PM and 12:50 AM.  14 high events contributed to this pattern. None of the contributing events were rebound highs.    No significant patterns of lows found.      Recommendations:   Recommend Levemir increase from 30 to 36 units twice daily, every 12 hours.   Continue current Novolog units and sliding scale at this time.    Please follow up for continued diabetes education on 4/12/2023, 11:00 am.      Ayanna Cabrera RN, BSN, [facility] 3/8/2023 11:44 AM

## 2023-03-10 NOTE — PROGRESS NOTES
Diabetes Self-Management Education & Support    Presents for: Follow-up    Type of Service: In Person Visit    Assessment Type:   REPORTS:  CGM Report:    Report shows has been trending down, now 220 mg/dl average sensor glucose over the past two weeks.    Standard deviation (SD) is +- 108 mg/dl. Goal for SD is +-50 mg/dl or lower.      GMI (Glucose Management Indicator or HbA1c ) 8.6%.    Glucose 38% in target (70 - 180), 23% high (181 - 199), 35% very high (200 - 400), 3% low (56 - 69) and 1% very low (39 - 55).      Malik had a pattern of significant highs between 2:25 AM and 7:15 AM.  13 high events contributed to this pattern. None of the contributing events were rebound highs.    Malik had a pattern of significant highs between 4:55 PM and 12:50 AM.  14 high events contributed to this pattern. None of the contributing events were rebound highs.    No significant patterns of lows found.      Recommendations:     Recommend Levemir increase from 30 to 36 units twice daily, every 12 hours.   Continue current Novolog units and sliding scale at this time.    Please follow up for continued diabetes education on 4/12/2023, 11:00 am.    Pt verbalized understanding of concepts discussed and recommendations provided today.       Continue education with the following diabetes management concepts: Problem Solving and Reducing Risks        PLAN    See plan above.  See patient instructions for complete plan.     Topics to cover at upcoming visits: Problem Solving and Reducing Risks    Follow-up: 4/12/2023, 11:00 am.      See Care Plan for co-developed, patient-state behavior change goals.  AVS provided for patient today.    Education Materials Provided:  No new materials provided today      SUBJECTIVE/OBJECTIVE:  Presents for: Follow-up  Accompanied by: Self  Diabetes education in the past 24mo: No  Focus of Visit: Problem Solving, Taking Medication, Assistance w/ making life changes, CGM  Type of CGM visit:  "Personal CGM Follow-up  Diabetes type: Type 1  Disease course: Improving  How confident are you filling out medical forms by yourself:: Somewhat  Cultural Influences/Ethnic Background:  Not  or     Diabetes Symptoms & Complications:  Fatigue: Sometimes  Neuropathy: Sometimes  Polydipsia: Sometimes  Polyphagia: Yes  Polyuria: Sometimes  Visual change: Sometimes  Slow healing wounds: Yes  Autonomic neuropathy: Yes  CVA: No  Heart disease: No  Nephropathy: Yes  Peripheral neuropathy: Yes  Peripheral Vascular Disease: No  Retinopathy: Yes  Sexual dysfunction: Yes    Patient Problem List and Family Medical History reviewed for relevant medical history, current medical status, and diabetes risk factors.    Vitals:  There were no vitals taken for this visit.  Estimated body mass index is 30.33 kg/m  as calculated from the following:    Height as of 12/24/22: 1.753 m (5' 9\").    Weight as of 2/24/23: 93.2 kg (205 lb 6.4 oz).   Last 3 BP:   BP Readings from Last 3 Encounters:   02/24/23 (!) 139/98   01/09/23 (!) 161/100   12/24/22 (!) 154/92       History   Smoking Status     Former     Types: Cigarettes   Smokeless Tobacco     Current       Labs:  Lab Results   Component Value Date    A1C 10.4 01/09/2023     Lab Results   Component Value Date     12/24/2022     12/24/2022     04/14/2022    GLC 62 06/19/2021     Lab Results   Component Value Date     01/09/2023     08/19/2016     HDL Cholesterol   Date Value Ref Range Status   08/19/2016 58 23 - 92 mg/dL      Direct Measure HDL   Date Value Ref Range Status   01/09/2023 68 >=40 mg/dL Final   ]  GFR Estimate   Date Value Ref Range Status   12/24/2022 >90 >60 mL/min/1.73m2 Final     Comment:     Effective December 21, 2021 eGFRcr in adults is calculated using the 2021 CKD-EPI creatinine equation which includes age and gender (Baltazar ernst al., NEJ, DOI: 10.1056/EMMAco3047502)   06/19/2021 70 >60 mL/min/[1.73_m2] Final     GFR " Estimate If Black   Date Value Ref Range Status   06/19/2021 84 >60 mL/min/[1.73_m2] Final     Lab Results   Component Value Date    CR 0.94 12/24/2022    CR 1.13 06/19/2021     No results found for: MICROALBUMIN    Healthy Eating:  Cultural/[employer]vent[employer] diet restrictions?: No  Meal planning/habits: Calorie counting, High carb, Frequent snacking  How many times a week on average do you eat food made away from home (restaurant/take-out)?: 0  Meals include: Dinner, Morning Snack, Evening Snack  Beverages: Water, Tea, Milk, Diet soda    Being Active:  Barrier to exercise: None    Monitoring:  Blood Glucose Meter: ContourNext, CGM  Times checking blood sugar at home (number): 3  Times checking blood sugar at home (per): Day  Blood glucose trend: Increasing    Taking Medications:  Diabetes Medication(s)     Insulin       insulin aspart (NOVOLOG PEN) 100 UNIT/ML pen    Sliding scale as directed, max of 30 units daily     LEVEMIR VIAL 100 UNIT/ML soln    Inject 26 Units Subcutaneous 2 times daily          Current Treatments: Diet, Insulin Injections  Given by: Patient    Problem Solving:  Is the patient at risk for hypoglycemia?: Yes  Hypoglycemia Frequency: Daily  Hypoglycemia Treatment: Other food  Is the patient at risk for DKA?: Yes      Reducing Risks:  CAD Risks: Diabetes Mellitus, Hypertension, Tobacco exposure  Has dilated eye exam at least once a year?: Yes  Sees dentist every 6 months?: No  Feet checked by healthcare provider in the last year?: Yes    Healthy Coping:  Informal Support system:: Family, Partner  Stage of change: PREPARATION (Decided to change - considering how)  Support resources: Offerings in Clinic Communities  Patient Activation Measure Survey Score:  No flowsheet data found.      Care Plan and Education Provided:  There are no care plans that you recently modified to display for this patient.      Ayanna Cabrera RN, BSN, [facility]  3/8/2023 12:07 PM     Time Spent: 60 minutes  Encounter Type:  Individual    Any diabetes medication dose changes were made via the CDE Protocol per the patient's primary care provider. A copy of this encounter was shared with the provider.

## 2023-04-05 NOTE — TELEPHONE ENCOUNTER
Left message to call back.  Maricruz Cordova, LPNLPN....................  4/5/2023   11:06 AM

## 2023-04-05 NOTE — TELEPHONE ENCOUNTER
Please call the patient and let him know that nephrology needs updated labs.  Please have him set up a fasting lab only appointment on 4/10 or 4/11 prior to his appointment to have the lab work completed.  Nelida Ndiaye PA-C ..................4/5/2023 9:50 AM

## 2023-04-11 NOTE — NURSING NOTE
Patient presents to clinic today for follow up.     Medication Review: complete    BP (!) 181/83   Pulse 83   Temp 98.7  F (37.1  C) (Tympanic)   Wt 93.2 kg (205 lb 6.4 oz)   SpO2 98%   BMI 30.33 kg/m       FOOD SECURITY SCREENING QUESTIONS:  The next two questions are to help us understand your food security.  If you are feeling you need any assistance in this area, we have resources available to support you today.    Hunger Vital Signs:  Within the past 12 months we worried whether our food would run out before we got money to buy more. Never  Within the past 12 months the food we bought just didn't last and we didn't have money to get more. Never    Marcia Salazar CNA on 4/11/2023 at 12:57 PM

## 2023-04-11 NOTE — TELEPHONE ENCOUNTER
Patient currently here for an office visit. Notified.  ..Erin Olivas LPN on 4/11/2023 at 12:57 PM

## 2023-04-11 NOTE — PATIENT INSTRUCTIONS
Blood pressure is elevated today. Started on hydrochlorothiazide. Encouraged to monitor blood pressure a couple times a week over the next few weeks. Return in 3 weeks for a recheck appointment. Work on diet and exercise to decrease blood pressure. Weight loss is helpful.  Decrease salt intake.      -- Learn about DASH Diet (http://bit.[employer]/DASHDiet - redirects to the [facility]) for dietary ways to reduce blood pressure    Mrs. Javier kilpatrick (has not salt). Try Garlic and Herb        Low-Salt Choices  Eating salt (sodium) can make your body retain too much water. Extra water makes your heart work harder. Canned, packaged, and frozen foods are easy to prepare. But they are often high in sodium. Here are some ideas for low-salt foods you can easily make yourself.   For breakfast  Fruit or 100% fruit juice. It's better to have whole fruit instead of 100% fruit juice.  Whole-wheat bread or an English muffin. Look for sodium content on Nutrition Facts labels.  Low-fat milk or yogurt  Unsalted eggs  Shredded wheat  Corn tortillas  Unsalted steamed rice  Regular (not instant) hot cereal, made without salt  Stay away from  Sausage, palacio, and ham  Flour tortillas  Packaged muffins, pancakes, and biscuits  Instant hot cereals  Cottage cheese     For lunch and dinner  Fresh fish, chicken, turkey, or meat--baked, broiled, or roasted without salt  Dry beans, cooked without salt  Tofu, stir-fried without salt  Unsalted fresh fruit and vegetables, or frozen or canned fruit and vegetables with no added salt  Stay away from  Lunch or deli meat that is cured or smoked  Cheese  Tomato juice and ketchup  Canned vegetables, soups, and fish not labeled as no-salt-added or reduced sodium  Packaged gravies and sauces  Olives, pickles, and relish  Bottled salad dressings     For snacks and desserts  Yogurt  Unsalted, air-popped popcorn  Unsalted nuts or seeds  Stay away from  Pies and cakes  Packaged dessert mixes  Pizza  Canned and packaged  puddings  Pretzels, chips, crackers, and nuts--unless the label says unsalted     [employer] ([employer]) last reviewed this educational content on 11/1/2019 2000-2021 [employer]. All rights reserved. This information is not intended as a substitute for professional medical care. Always follow your healthcare professional's instructions.

## 2023-04-11 NOTE — PROGRESS NOTES
"  Assessment & Plan   Problem List Items Addressed This Visit        Endocrine    Diabetes mellitus type I (H)    Relevant Medications    insulin aspart (NOVOLOG PEN) 100 UNIT/ML pen    insulin detemir (LEVEMIR VIAL) 100 UNIT/ML vial       Circulatory    Benign essential hypertension    Relevant Medications    lisinopril-hydrochlorothiazide (ZESTORETIC) 20-12.5 MG tablet   Other Visit Diagnoses     Uncontrolled type 1 diabetes mellitus with hyperglycemia (H)    -  Primary    Relevant Medications    atorvastatin (LIPITOR) 20 MG tablet    insulin syringe-needle U-100 (BD VEO INSULIN SYRINGE U/F) 31G X 15/64\" 0.5 ML    insulin aspart (NOVOLOG PEN) 100 UNIT/ML pen    insulin detemir (LEVEMIR VIAL) 100 UNIT/ML vial         Hypertension:  Blood pressure is elevated today. Started on hydrochlorothiazide.  Gave side effect profile.  Discussed lab work.  Encouraged to monitor blood pressure a couple times a week over the next few weeks. Return in 3 weeks for a recheck appointment. Work on diet and exercise to decrease blood pressure. Weight loss is helpful.  Decrease salt intake.   Stressed the need to decrease salt intake.     -- Learn about DASH Diet ([email or web address] - redirects to the [employer]) for dietary ways to reduce blood pressure    Mrs. Javier kilpatrick (has not salt). Try Garlic and Herb    Type 1 diabetes mellitus: Continue medications as previously prescribed.  Continue close follow-up with diabetic Ed.  Updated on lab work.  Recheck in 3 months for monitoring.  Encouraged to work on improving his diet and exercise.  Decrease carbohydrates, sugar, and salt.    Prescription drug management  30 minutes spent by me on the date of the encounter doing chart review, history and exam, documentation and further activities per the note       See Patient Instructions    No follow-ups on file.    Nelida Ndiaye PA-C  [facility] AND [facility]    Cyrus Gan is a 48 year old, presenting for the following health " issues:  Follow Up        4/11/2023    12:55 PM   Additional Questions   Roomed by Marcia   Accompanied by N/A         4/11/2023    12:55 PM   Patient Reported Additional Medications   Patient reports taking the following new medications Ag     History of Present Illness       Mental Health Follow-up:  Patient presents to follow-up on Depression & Anxiety.Patient's depression since last visit has been:  Good  The patient is not having other symptoms associated with depression.  Patient's anxiety since last visit has been:  Better  The patient is not having other symptoms associated with anxiety.  Any significant life events: relationship concerns  Patient is not feeling anxious or having panic attacks.  Patient has no concerns about alcohol or drug use.    Diabetes:   He presents for follow up of diabetes.  He is checking home blood glucose with a continuous glucose monitor.  He checks blood glucose before meals.  Blood glucose is sometimes over 200 and sometimes under 70. He is aware of hypoglycemia symptoms including shakiness, dizziness and weakness. He is concerned about blood sugar frequently over 200.  He is having burning in feet, excessive thirst and weight gain. The patient has not had a diabetic eye exam in the last 12 months.         He eats 2-3 servings of fruits and vegetables daily.He consumes 0 sweetened beverage(s) daily.He exercises with enough effort to increase his heart rate 9 or less minutes per day.  He exercises with enough effort to increase his heart rate 3 or less days per week.   He is taking medications regularly.    Today's PHQ-9         PHQ-9 Total Score: 4    PHQ-9 Q9 Thoughts of better off dead/self-harm past 2 weeks :   Not at all    How difficult have these problems made it for you to do your work, take care of things at home, or get along with other people: Somewhat difficult  Today's ABDON-7 Score: 4       Got Dexcom.  BSs in 200-300.  Lowest down to 40.      Patient is coming  in today for hypertension and diabetic follow-up.  Patient recently got his Dexcom.  Blood sugars have improved however they are still going up to 200-300 at times.  Lowest has been down to 40.  Does not feel well when they get low.  Currently taking Lipitor 20 mg daily.  No side effects noted with medication.  Tolerates medication well.  Taking lisinopril for hypertension and kidney protection.  Tolerates the medication well.  No side effects noted.    Patient tends to add a lot of salt to his foods.  Understands that he needs to work on improving his diet in order to keep his blood pressure lower along with his blood sugars.      Review of Systems   Constitutional, HEENT, cardiovascular, pulmonary, gi and gu systems are negative, except as otherwise noted.      Objective    BP (!) 150/80 (BP Location: Right arm, Patient Position: Sitting, Cuff Size: Adult Large)   Pulse 83   Temp 98.7  F (37.1  C) (Tympanic)   Wt 93.2 kg (205 lb 6.4 oz)   SpO2 98%   BMI 30.33 kg/m    Body mass index is 30.33 kg/m .  Physical Exam  Vitals and nursing note reviewed.   Constitutional:       Appearance: Normal appearance.   Cardiovascular:      Rate and Rhythm: Normal rate and regular rhythm.      Heart sounds: Normal heart sounds.   Pulmonary:      Effort: Pulmonary effort is normal.      Breath sounds: Normal breath sounds.   Musculoskeletal:         General: Normal range of motion.   Skin:     General: Skin is warm and dry.   Neurological:      General: No focal deficit present.      Mental Status: He is alert and oriented to person, place, and time.   Psychiatric:         Mood and Affect: Mood normal.         Behavior: Behavior normal.

## 2023-05-03 NOTE — LETTER
May 4, 2023      Mal[address] Melendez  [address]   [place] [address]        Dear ,    We are writing to inform you of your test results.    Your kidney function is minimally elevated.  I would recommend increasing water intake.  I would hold off on taking ibuprofen, aspirin, or Aleve.  Please continue taking aspirin 81 mg daily for heart disease prevention.    We can recheck this in 3 months at your follow up appointment.     Resulted Orders   Basic Metabolic Panel   Result Value Ref Range    Sodium 136 136 - 145 mmol/L    Potassium 4.4 3.4 - 5.3 mmol/L    Chloride 97 (L) 98 - 107 mmol/L    Carbon Dioxide (CO2) 30 (H) 22 - 29 mmol/L    Anion Gap 9 7 - 15 mmol/L    Urea Nitrogen 18.9 6.0 - 20.0 mg/dL    Creatinine 1.22 (H) 0.67 - 1.17 mg/dL    Calcium 9.1 8.6 - 10.0 mg/dL    Glucose 262 (H) 70 - 99 mg/dL    GFR Estimate 73 >60 mL/min/1.73m2      Comment:      eGFR calculated using 2021 CKD-EPI equation.       If you have any questions or concerns, please call the clinic at the number listed above.       Sincerely,      Nelida Ndiaye PA-C

## 2023-05-03 NOTE — NURSING NOTE
Patient is here to follow up on hypertension.    Previous A1C is not at goal of <8  Lab Results   Component Value Date    A1C 9.0 04/10/2023    A1C 10.4 01/09/2023    A1C 10.0 02/18/2022     Urine Microalbumin/Creat:    Albumin Urine mg/L   Date Value Ref Range Status   04/10/2023 71.9 mg/L Final     Comment:     The reference ranges have not been established in urine albumin. The results should be integrated into the clinical context for interpretation.   02/18/2022 212 mg/L Final     Albumin Urine mg/g Cr   Date Value Ref Range Status   04/10/2023 196.99 (H) 0.00 - 17.00 mg/g Cr Final     Comment:     Microalbuminuria is defined as an albumin:creatinine ratio of 17 to 299 for males and 25 to 299 for females. A ratio of albumin:creatinine of 300 or higher is indicative of overt proteinuria.  Due to biologic variability, positive results should be confirmed by a second, first-morning random or 24-hour timed urine specimen. If there is discrepancy, a third specimen is recommended. When 2 out of 3 results are in the microalbuminuria range, this is evidence for incipient nephropathy and warrants increased efforts at glucose control, blood pressure control, and institution of therapy with an angiotensin-converting-enzyme (ACE) inhibitor (if the patient can tolerate it).     02/18/2022 481.82 (H) 0.00 - 17.00 mg/g Cr Final     Creatinine Urine mg/dL   Date Value Ref Range Status   04/10/2023 36.5 mg/dL Final     Comment:     The reference ranges have not been established in urine creatinine. The results should be integrated into the clinical context for interpretation.   02/18/2022 44 mg/dL Final     Foot exam 1/2023  Eye exam due    Tobacco User yes  Patient is not on a daily aspirin  Patient is on a Statin.  Blood pressure today of:     BP Readings from Last 1 Encounters:   05/03/23 124/76      is at the goal of <139/89 for diabetics.    Antoinette Boswell LPN on 5/3/2023 at 11:04 AM      Medication Reconciliation:  complete      Advance care directive on file? no  Advance care directive provided to patient? shaila Boswell LPN

## 2023-05-03 NOTE — PROGRESS NOTES
Assessment & Plan   Problem List Items Addressed This Visit        Endocrine    Diabetes mellitus type I (H) - Primary    Relevant Medications    insulin aspart (NOVOLOG VIAL) 100 UNITS/ML vial    aspirin (ASA) 81 MG EC tablet    Other Relevant Orders    Basic Metabolic Panel (Completed)      Started on aspirin 81 mg daily.  Refilled medication.  Completed BMP for monitoring.  Encourage good diet, exercise, weight loss, and decreasing sugar and salt in his diet.  Recheck in 3 months for medication monitoring.    Most recent urine rechecked showed improving proteinuria level.  We will continue to closely monitor protein level to ensure that it stabilizes.    Ordering of each unique test  Prescription drug management         See Patient Instructions    Return in about 3 months (around 8/3/2023) for Diabetic recheck.    Nelida Ndiaye PA-C  [facility] AND \[facility]\""    Cyrus Gan is a 48 year old, presenting for the following health issues:  Hypertension        5/3/2023    11:00 AM   Additional Questions   Roomed by Antoinette ace     History of Present Illness       Diabetes:   He presents for follow up of diabetes.  He is checking home blood glucose with a continuous glucose monitor.  He checks blood glucose before meals, after meals, before and after meals and at bedtime.  Blood glucose is sometimes over 200 and sometimes under 70. He is aware of hypoglycemia symptoms including shakiness, dizziness and weakness. He has no concerns regarding his diabetes at this time.  He is having numbness in feet and burning in feet. The patient has had a diabetic eye exam in the last 12 months. Eye exam performed on year ago. Location of last eye exam in [facility].        Hypertension: He presents for follow up of hypertension.  He does not check blood pressure  regularly outside of the clinic. Outpatient blood pressures have not been over 140/90. He does not follow a low salt diet.     He eats 2-3 servings of fruits  and vegetables daily.He consumes 0 sweetened beverage(s) daily.He exercises with enough effort to increase his heart rate 9 or less minutes per day.  He exercises with enough effort to increase his heart rate 3 or less days per week.   He is taking medications regularly.    Today's PHQ-9         PHQ-9 Total Score: 4    PHQ-9 Q9 Thoughts of better off dead/self-harm past 2 weeks :   Not at all    How difficult have these problems made it for you to do your work, take care of things at home, or get along with other people: Not difficult at all       levemir 36 units twice daily.  No side effects noted with medication.  Tolerates medication well.  Blood pressure is currently stable.  No side effects noted with the medication.  Tolerating Lipitor well.    Diabetes:   Blood sugar results: up and down  Problems with medication: none   Symptoms of low blood sugars: none  Diet and exercise: some  Blood pressure: stable  Ace or Arb: yes  Foot sores, numbness or tingling: no  Last foot check: 2023  Urinary symptoms: none  Last urine protein screenin/10/2023  Taking aspirin daily: no  Taking cholesterol medication: yes  Last eye check: ordered   Smoking: former  ER visits with DM: no      Review of Systems   Constitutional, HEENT, cardiovascular, pulmonary, gi and gu systems are negative, except as otherwise noted.      Objective    /76   Pulse 71   Temp 97.9  F (36.6  C) (Tympanic)   Resp 18   Wt 89.9 kg (198 lb 3.2 oz)   SpO2 99%   BMI 29.27 kg/m    Body mass index is 29.27 kg/m .  Physical Exam  Vitals and nursing note reviewed.   Constitutional:       Appearance: Normal appearance.   Cardiovascular:      Rate and Rhythm: Normal rate and regular rhythm.      Heart sounds: Normal heart sounds.   Pulmonary:      Effort: Pulmonary effort is normal.      Breath sounds: Normal breath sounds.   Musculoskeletal:         General: Normal range of motion.   Skin:     General: Skin is warm and dry.   Neurological:       General: No focal deficit present.      Mental Status: He is alert and oriented to person, place, and time.   Psychiatric:         Mood and Affect: Mood normal.         Behavior: Behavior normal.

## 2023-05-04 PROBLEM — R80.9 PROTEINURIA, UNSPECIFIED TYPE: Status: ACTIVE | Noted: 2023-01-01

## 2023-05-04 NOTE — TELEPHONE ENCOUNTER
Please call the patient know that he should take an aspirin 81 mg daily to help prevent heart disease.  This was sent to the pharmacy.  He can also obtain it over-the-counter if preferred.  Nelida Ndiaye PA-C.......... 5/4/2023 10:02 AM

## 2023-05-11 NOTE — TELEPHONE ENCOUNTER
Arrive to 1st floor OBS unit. Pt able to walk to hospital bed. Mild SOB noted and reconnected O2/3L NC. Pt Still has wheezing throughout chest. No cough noted at this time. Denies any chest pressure now. Wife at bedside.   Called patient back and notified of below response.  ..Erin Olivas LPN on 5/11/2023 at 4:24 PM

## 2023-07-27 NOTE — TELEPHONE ENCOUNTER
Disp Refills Start End BRI   lisinopril-hydrochlorothiazide (ZESTORETIC) 20-12.5 MG tablet 90 tablet 1 4/11/2023  --   Sig - Route: Take 1 tablet by mouth daily New prescriptio   [facility].  Refill request to soon.  Ellen Cheney RN on 7/27/2023 at 9:28 AM

## 2023-07-31 NOTE — TELEPHONE ENCOUNTER
Filled 04/11/23 #90 x 1. Due 10/11/2023. Contacted pharmacy who verified receipt of refill. Unable to complete prescription refill per RNMedication Refill Policy.................... Cinthia Maynard RN ....................  7/31/2023   3:25 PM      lisinopril-hydrochlorothiazide (ZESTORETIC) 20-12.5 MG tablet 90 tablet 1 4/11/2023  --   Sig - Route: Take 1 tablet by mouth daily New prescription - Oral   Sent to pharmacy as: Lisinopril-hydroCHLOROthiazide 20-12.5 MG Oral Tablet (ZESTORETIC)   Class: E-Prescribe   Order: [id number]   E-Prescribing Status: Receipt confirmed by pharmacy (4/11/2023  1:27 PM CDT)       [facility] [address]

## 2023-08-03 NOTE — NURSING NOTE
"Chief Complaint   Patient presents with    Diabetes     3 month check     Refill hydrochlorothiazide/ lisinopril   Initial /80   Pulse 78   Temp 97.7  F (36.5  C) (Tympanic)   Resp 20   Ht 1.753 m (5' 9\")   Wt 88.5 kg (195 lb)   SpO2 97%   BMI 28.80 kg/m   Estimated body mass index is 28.8 kg/m  as calculated from the following:    Height as of this encounter: 1.753 m (5' 9\").    Weight as of this encounter: 88.5 kg (195 lb).  Medication Reconciliation: complete    Amy Kerr LPN    "

## 2023-08-03 NOTE — LETTER
August 7, 2023      Malik Melendez  [address]   [place] [address]        Dear ,    We are writing to inform you of your test results.    Your electrolytes and kidney function are stable.  Hemoglobin A1c has mildly increased to 9.4.  Please continue to work closely on improving your blood sugars.  I would recommend improving work on diet, exercise, and decreasing sugar and carbohydrates in your diet to help improve your blood sugars.  Please be seen in clinic in 3 months for monitoring.    Consider the following general recommendations to keep your blood sugar in a good range:    1. Lose weight - Losing 5 to 10 percent of your body weight can lower your risk a lot. If you weigh 200 pounds, that means you should lose 10 to 20 pounds. If you weigh 150 pounds, that means you should lose 7 to 15 pounds.     2. Eat right - Choose a diet rich in fruits, vegetables, and low-fat dairy products, but low in meats, sweets, and refined grains. Stay away from sweet drinks, like soda and juice.     3. Be active for 30 minutes a day - You don t have to go to the gym or break a sweat to get a benefit. Walking, gardening, and dancing are all activities that can help.     4. Quit smoking - If you smoke, ask your doctor or nurse for advice on how to quit. People are much more likely to succeed if they have help and get medicines to help them quit.       Resulted Orders   Basic Metabolic Panel   Result Value Ref Range    Sodium 138 136 - 145 mmol/L    Potassium 4.0 3.4 - 5.3 mmol/L    Chloride 101 98 - 107 mmol/L    Carbon Dioxide (CO2) 27 22 - 29 mmol/L    Anion Gap 10 7 - 15 mmol/L    Urea Nitrogen 20.7 (H) 6.0 - 20.0 mg/dL    Creatinine 1.06 0.67 - 1.17 mg/dL    Calcium 9.5 8.6 - 10.0 mg/dL    Glucose 148 (H) 70 - 99 mg/dL    GFR Estimate 86 >60 mL/min/1.73m2   Hemoglobin A1c   Result Value Ref Range    Hemoglobin A1C 9.4 (H) 4.0 - 6.2 %       If you have any questions or concerns, please call the clinic at the number listed  above.       Sincerely,      Nelida Ndiaye PA-C

## 2023-08-03 NOTE — PROGRESS NOTES
Answers submitted by the patient for this visit:  Lipid Visit (Submitted on 8/3/2023)  Chief Complaint: Chronic problems general questions HPI Form  Are you regularly taking any medication or supplement to lower your cholesterol?: Yes  Are you having muscle aches or other side effects that you think could be caused by your cholesterol lowering medication?: No  Patient Health Questionnaire (Submitted on 8/3/2023)  If you checked off any problems, how difficult have these problems made it for you to do your work, take care of things at home, or get along with other people?: Somewhat difficult  PHQ9 TOTAL SCORE: 7  ABDON-7 (Submitted on 8/3/2023)  ABDON 7 TOTAL SCORE: 4  Hypertension Visit (Submitted on 8/3/2023)  Chief Complaint: Chronic problems general questions HPI Form  Do you check your blood pressure regularly outside of the clinic?: No  Are your blood pressures ever more than 140 on the top number (systolic) OR more than 90 on the bottom number (diastolic)? (For example, greater than 140/90): No  Are you following a low salt diet?: Yes  Diabetes Visit (Submitted on 8/3/2023)  Chief Complaint: Chronic problems general questions HPI Form  Frequency of checking blood sugars:: three times daily  What time of day are you checking your blood sugars : before meals  Have you had any blood sugars above 200?: Yes  Have you had any blood sugars below 70?: Yes  Hypoglycemia symptoms:: shakiness, dizziness, confusion  Diabetic concerns:: blood sugar frequently over 200, low blood sugar, several less than 70 in the past few weeks  Paraesthesia present:: numbness in feet, excessive thirst, blurry vision, weight loss  Depression / Anxiety Questionnaire (Submitted on 8/3/2023)  Chief Complaint: Chronic problems general questions HPI Form  Depression/Anxiety: Depression & Anxiety  Vascular Disease (Submitted on 8/3/2023)  Chief Complaint: Chronic problems general questions HPI Form  Nitroglycerin use:: never  Do you take an aspirin  every day?: Yes  Depression & Anxiety (Submitted on 8/3/2023)  Chief Complaint: Chronic problems general questions HPI Form  Status since last visit:: medium  Anxiety since last: : better  Other associated symptoms of depression:: No  Other associated symotome: : No  Significant life event: : No  Anxious:: No  Current substance use:: No  Back Pain Visit Questionnaire (Submitted on 8/3/2023)  Your back pain is: recurring  Chronic or Recurring Back Pain Visit Questionnaire (Submitted on 8/3/2023)  Where is your back pain located? : right lower back, left lower back, right middle of back, left middle of back, left shoulder  How would you describe your back pain? : burning, dull ache, shooting  Where does your back pain spread? : right thigh, left thigh  Since you noticed your back pain, how has it changed? : always present, but gets better and worse  Does your back pain interfere with your job?: No  General Questionnaire (Submitted on 8/3/2023)  Chief Complaint: Chronic problems general questions HPI Form  What is the reason for your visit today? : daibetes  How many servings of fruits and vegetables do you eat daily?: 2-3  On average, how many sweetened beverages do you drink each day (Examples: soda, juice, sweet tea, etc.  Do NOT count diet or artificially sweetened beverages)?: 0  How many minutes a day do you exercise enough to make your heart beat faster?: 9 or less  How many days a week do you exercise enough to make your heart beat faster?: 3 or less  How many days per week do you miss taking your medication?: 1      Assessment & Plan   Problem List Items Addressed This Visit          Circulatory    Benign essential hypertension - Primary    Relevant Medications    lisinopril-hydrochlorothiazide (ZESTORETIC) 20-12.5 MG tablet     Other Visit Diagnoses       Uncontrolled type 1 diabetes mellitus with hyperglycemia (H)        Relevant Medications    Continuous Blood Gluc  (DEXCOM G6 ) DIANA     "Other Relevant Orders    FOOT EXAM (Completed)    Basic Metabolic Panel    Hemoglobin A1c             Hypertension: Completed lab work for monitoring.  Refilled medication.  No acute concerns at this time.  Vital signs are stable.  Encourage continue good diet, exercise, weight loss, and decreasing salt in his diet.    Type 1 diabetes mellitus: Refilled medication.  Completed lab work for monitoring.  Refilled prescriptions.  Unremarkable foot exam.  Repeat recheck in 3 months.       BMI:   Estimated body mass index is 28.8 kg/m  as calculated from the following:    Height as of this encounter: 1.753 m (5' 9\").    Weight as of this encounter: 88.5 kg (195 lb).       See Patient Instructions    No follow-ups on file.    Nelida Ndiaye PA-C  [facility]    Cyrus Gan is a 48 year old, presenting for the following health issues:  Diabetes (3 month check)        8/3/2023     9:42 AM   Additional Questions   Roomed by Gloria PORTER LPN   Accompanied by self       History of Present Illness       Back Pain:  He presents for follow up of back pain. Patient's back pain is a recurring problem.  Location of back pain:  Right lower back, left lower back, right middle of back, left middle of back and left shoulder  Description of back pain: burning, dull ache and shooting  Back pain spreads: right thigh and left thigh    Since patient first noticed back pain, pain is: always present, but gets better and worse  Does back pain interfere with his job:  No       Mental Health Follow-up:  Patient presents to follow-up on Depression & Anxiety.Patient's depression since last visit has been:  Medium  The patient is not having other symptoms associated with depression.  Patient's anxiety since last visit has been:  Better  The patient is not having other symptoms associated with anxiety.  Any significant life events: No  Patient is not feeling anxious or having panic attacks.  Patient has no concerns about alcohol " or drug use.    Diabetes:   He presents for follow up of diabetes.  He is checking home blood glucose three times daily.   He checks blood glucose before meals.  Blood glucose is sometimes over 200 and sometimes under 70. He is aware of hypoglycemia symptoms including shakiness, dizziness and confusion.   He is concerned about blood sugar frequently over 200 and low blood sugar, several less than 70 in the past few weeks.   He is having numbness in feet, excessive thirst, blurry vision and weight loss.            Hyperlipidemia:  He presents for follow up of hyperlipidemia.   He is taking medication to lower cholesterol. He is not having myalgia or other side effects to statin medications.    Hypertension: He presents for follow up of hypertension.  He does not check blood pressure  regularly outside of the clinic. Outpatient blood pressures have not been over 140/90. He follows a low salt diet.     Vascular Disease:  He presents for follow up of vascular disease.     He never takes nitroglycerin. He takes daily aspirin.    Reason for visit:  Daibetes    He eats 2-3 servings of fruits and vegetables daily.He consumes 0 sweetened beverage(s) daily.He exercises with enough effort to increase his heart rate 9 or less minutes per day.  He exercises with enough effort to increase his heart rate 3 or less days per week. He is missing 1 dose(s) of medications per week.      Diabetes:   Blood sugar results: stable, 120s this morning   Problems with medication: none   Symptoms of low blood sugars: none  Diet and exercise: some  Blood pressure: stable  Ace or Arb: yes  Foot sores, numbness or tingling: no  Last foot check: 8/3/2023  Urinary symptoms: none  Last urine protein screenin/10/2023  Taking aspirin daily: yes  Taking cholesterol medication: yes  Last eye check: needs to be completed, due  Smoking: former  ER visits with DM: no    Patient has been doing well.  Recently lost his Dexcom G6  device.   "Wondering about getting another  for diabetes mellitus monitoring.  Currently using test trips and lancets for checking his blood sugars.    Hypertension: Currently doing well.  Blood pressure has been stable.  No acute concerns at this time.  Vital signs have been stable.    Review of Systems   Constitutional, HEENT, cardiovascular, pulmonary, gi and gu systems are negative, except as otherwise noted.      Objective    /80   Pulse 78   Temp 97.7  F (36.5  C) (Tympanic)   Resp 20   Ht 1.753 m (5' 9\")   Wt 88.5 kg (195 lb)   SpO2 97%   BMI 28.80 kg/m    Body mass index is 28.8 kg/m .  Physical Exam  Vitals and nursing note reviewed.   Constitutional:       Appearance: Normal appearance. He is well-developed.   HENT:      Head: Normocephalic.   Eyes:      Extraocular Movements: Extraocular movements intact.      Conjunctiva/sclera: Conjunctivae normal.      Pupils: Pupils are equal, round, and reactive to light.   Neck:      Thyroid: No thyromegaly.   Cardiovascular:      Rate and Rhythm: Normal rate and regular rhythm.      Heart sounds: Normal heart sounds. No murmur heard.  Pulmonary:      Effort: Pulmonary effort is normal. No respiratory distress.      Breath sounds: Normal breath sounds. No wheezing or rales.   Musculoskeletal:         General: Normal range of motion.      Cervical back: Normal range of motion and neck supple.   Lymphadenopathy:      Cervical: No cervical adenopathy.   Skin:     General: Skin is warm and dry.      Comments: No lesions on feet bilaterally.   Neurological:      Mental Status: He is alert and oriented to person, place, and time.      Comments: Diabetic foot exam:  Normal diabetic monofilament sensation exam bilaterally. No sores or calluses appreciated.    Psychiatric:         Mood and Affect: Mood normal.         Behavior: Behavior normal.                        "

## 2023-09-26 NOTE — TELEPHONE ENCOUNTER
Please tell him to increase his insulin detemir up to 38 or 40 units twice daily as tolerated.  Nelida Ndiaye PA-C ..................9/26/2023 2:17 PM

## 2023-09-26 NOTE — TELEPHONE ENCOUNTER
Left message to call back....................  9/26/2023   2:22 PM  Amy Kerr LPN ....................9/26/2023  2:22 PM  Ext. [phone]

## 2023-09-26 NOTE — TELEPHONE ENCOUNTER
"After birth date and last name were verified, patient notified of the high A1c.  Confirms that they have been running high, even above 400.  His hours changed at work recently, but no other changes to note.  Said \"that's my life\" when asked about other factors that could be affecting it.  Per sliding scale, he is using 16-20 units on average per day.  He refused being transferred to the appointment desk to make the November diabetic check, but will call back to do so.    He would like a referral for diabetic education.  Encouraged to bring his significant other along.  Yumiko Munoz CMA ([facility])   "

## 2023-09-27 NOTE — TELEPHONE ENCOUNTER
Left message to call back....................  9/27/2023   11:01 AM  Amy Kerr LPN ....................9/27/2023  11:01 AM  Ext . [phone]

## 2023-09-28 NOTE — TELEPHONE ENCOUNTER
Left message to call back....................  9/28/2023   10:34 AM Amy Kerr LPN ....................9/28/2023  10:35 AM  Ext [phone]

## 2023-10-02 NOTE — TELEPHONE ENCOUNTER
Patient has not called back or emergency contact. Will close note. Amy Kerr LPN ....................10/2/2023  12:46 PM

## 2023-10-31 ENCOUNTER — DOCUMENTATION ONLY (OUTPATIENT)
Dept: FAMILY MEDICINE | Facility: OTHER | Age: 48
End: 2023-10-31
Payer: MEDICAID
